# Patient Record
Sex: MALE | Race: WHITE | NOT HISPANIC OR LATINO | Employment: OTHER | URBAN - METROPOLITAN AREA
[De-identification: names, ages, dates, MRNs, and addresses within clinical notes are randomized per-mention and may not be internally consistent; named-entity substitution may affect disease eponyms.]

---

## 2017-01-08 RX ORDER — LEVOTHYROXINE SODIUM 175 UG/1
TABLET ORAL
Qty: 90 TABLET | Refills: 0 | Status: SHIPPED | OUTPATIENT
Start: 2017-01-08 | End: 2017-05-15 | Stop reason: SDUPTHER

## 2017-02-07 RX ORDER — ARMODAFINIL 250 MG/1
250 TABLET ORAL DAILY
Qty: 90 TABLET | Refills: 1 | Status: SHIPPED | OUTPATIENT
Start: 2017-02-07 | End: 2017-06-05 | Stop reason: SDUPTHER

## 2017-02-11 RX ORDER — LEVOTHYROXINE SODIUM 175 UG/1
TABLET ORAL
Qty: 90 TABLET | Refills: 0 | Status: SHIPPED | OUTPATIENT
Start: 2017-02-11 | End: 2017-05-15 | Stop reason: SDUPTHER

## 2017-03-14 ENCOUNTER — CLINICAL SUPPORT (OUTPATIENT)
Dept: AUDIOLOGY | Facility: CLINIC | Age: 47
End: 2017-03-14

## 2017-03-14 ENCOUNTER — CLINICAL SUPPORT (OUTPATIENT)
Dept: AUDIOLOGY | Facility: CLINIC | Age: 47
End: 2017-03-14
Payer: COMMERCIAL

## 2017-03-14 DIAGNOSIS — H90.0 CONDUCTIVE HEARING LOSS, BILATERAL: Primary | ICD-10-CM

## 2017-03-14 PROCEDURE — 92557 COMPREHENSIVE HEARING TEST: CPT | Mod: S$GLB,,, | Performed by: AUDIOLOGIST

## 2017-03-14 NOTE — PROGRESS NOTES
Patient is currently wearing [employer]  processors bilaterally.  Programmed processors based on today's audiogram and BC direct thresholds.  Sent paperwork to [employer] for upgrades.  The patient will be called when new [employer]aha 5 processors arrive.

## 2017-05-14 ENCOUNTER — PATIENT MESSAGE (OUTPATIENT)
Dept: FAMILY MEDICINE | Facility: CLINIC | Age: 47
End: 2017-05-14

## 2017-05-15 DIAGNOSIS — E07.9 THYROID DISEASE: Primary | ICD-10-CM

## 2017-05-15 RX ORDER — BUPROPION HYDROCHLORIDE 150 MG/1
150 TABLET, EXTENDED RELEASE ORAL 2 TIMES DAILY
Qty: 60 TABLET | Refills: 0 | Status: SHIPPED | OUTPATIENT
Start: 2017-05-15 | End: 2017-06-05 | Stop reason: SDUPTHER

## 2017-05-15 RX ORDER — LEVOTHYROXINE SODIUM 175 UG/1
175 TABLET ORAL DAILY
Qty: 30 TABLET | Refills: 0 | Status: SHIPPED | OUTPATIENT
Start: 2017-05-15 | End: 2017-06-05 | Stop reason: SDUPTHER

## 2017-05-25 ENCOUNTER — PATIENT OUTREACH (OUTPATIENT)
Dept: ADMINISTRATIVE | Facility: HOSPITAL | Age: 47
End: 2017-05-25

## 2017-05-25 NOTE — PROGRESS NOTES
Pre visit chart audit letter sent. Attempting to contact pt to schedule routine labs. Unable to reach patient at this time. Left voicemail.

## 2017-06-02 ENCOUNTER — CLINICAL SUPPORT (OUTPATIENT)
Dept: AUDIOLOGY | Facility: CLINIC | Age: 47
End: 2017-06-02

## 2017-06-02 DIAGNOSIS — H90.0 CONDUCTIVE HEARING LOSS, BILATERAL: Primary | ICD-10-CM

## 2017-06-02 PROCEDURE — 99499 UNLISTED E&M SERVICE: CPT | Mod: S$GLB,,, | Performed by: OTOLARYNGOLOGY

## 2017-06-02 NOTE — PROGRESS NOTES
Patient brought in new [employer] 5 processors (Right SN: [id number], Left SN: [id number]) for programming.  Measured thresholds through devices and programmed accordingly.  Paired phone clip to processors.  He seems happy with everything today and will call to schedule further appointments.

## 2017-06-05 ENCOUNTER — LAB VISIT (OUTPATIENT)
Dept: LAB | Facility: HOSPITAL | Age: 47
End: 2017-06-05
Attending: FAMILY MEDICINE
Payer: COMMERCIAL

## 2017-06-05 ENCOUNTER — OFFICE VISIT (OUTPATIENT)
Dept: FAMILY MEDICINE | Facility: CLINIC | Age: 47
End: 2017-06-05
Payer: COMMERCIAL

## 2017-06-05 VITALS
HEART RATE: 85 BPM | TEMPERATURE: 99 F | SYSTOLIC BLOOD PRESSURE: 110 MMHG | HEIGHT: 71 IN | OXYGEN SATURATION: 96 % | WEIGHT: 219.13 LBS | DIASTOLIC BLOOD PRESSURE: 70 MMHG | BODY MASS INDEX: 30.68 KG/M2

## 2017-06-05 DIAGNOSIS — E78.2 MIXED HYPERLIPIDEMIA: ICD-10-CM

## 2017-06-05 DIAGNOSIS — E07.9 THYROID DISEASE: ICD-10-CM

## 2017-06-05 DIAGNOSIS — G47.00 INSOMNIA, UNSPECIFIED TYPE: ICD-10-CM

## 2017-06-05 DIAGNOSIS — E03.4 HYPOTHYROIDISM DUE TO ACQUIRED ATROPHY OF THYROID: ICD-10-CM

## 2017-06-05 DIAGNOSIS — I10 ESSENTIAL HYPERTENSION: Primary | ICD-10-CM

## 2017-06-05 DIAGNOSIS — I10 ESSENTIAL HYPERTENSION: ICD-10-CM

## 2017-06-05 DIAGNOSIS — F32.A DEPRESSION, UNSPECIFIED DEPRESSION TYPE: ICD-10-CM

## 2017-06-05 LAB
ALBUMIN SERPL BCP-MCNC: 4 G/DL
ALP SERPL-CCNC: 69 U/L
ALT SERPL W/O P-5'-P-CCNC: 44 U/L
ANION GAP SERPL CALC-SCNC: 10 MMOL/L
AST SERPL-CCNC: 26 U/L
BASOPHILS # BLD AUTO: 0.04 K/UL
BASOPHILS NFR BLD: 0.4 %
BILIRUB SERPL-MCNC: 0.6 MG/DL
BUN SERPL-MCNC: 13 MG/DL
CALCIUM SERPL-MCNC: 9.8 MG/DL
CHLORIDE SERPL-SCNC: 97 MMOL/L
CHOLEST/HDLC SERPL: 4.6 {RATIO}
CO2 SERPL-SCNC: 26 MMOL/L
CREAT SERPL-MCNC: 1.1 MG/DL
DIFFERENTIAL METHOD: ABNORMAL
EOSINOPHIL # BLD AUTO: 0.3 K/UL
EOSINOPHIL NFR BLD: 2.3 %
ERYTHROCYTE [DISTWIDTH] IN BLOOD BY AUTOMATED COUNT: 13.3 %
EST. GFR  (AFRICAN AMERICAN): >60 ML/MIN/1.73 M^2
EST. GFR  (NON AFRICAN AMERICAN): >60 ML/MIN/1.73 M^2
GLUCOSE SERPL-MCNC: 110 MG/DL
HCT VFR BLD AUTO: 46 %
HDL/CHOLESTEROL RATIO: 21.8 %
HDLC SERPL-MCNC: 156 MG/DL
HDLC SERPL-MCNC: 34 MG/DL
HGB BLD-MCNC: 16.1 G/DL
LDLC SERPL CALC-MCNC: 84.8 MG/DL
LYMPHOCYTES # BLD AUTO: 3.5 K/UL
LYMPHOCYTES NFR BLD: 32.3 %
MCH RBC QN AUTO: 30.2 PG
MCHC RBC AUTO-ENTMCNC: 35 %
MCV RBC AUTO: 86 FL
MONOCYTES # BLD AUTO: 1.1 K/UL
MONOCYTES NFR BLD: 10.1 %
NEUTROPHILS # BLD AUTO: 5.9 K/UL
NEUTROPHILS NFR BLD: 54.7 %
NONHDLC SERPL-MCNC: 122 MG/DL
PLATELET # BLD AUTO: 267 K/UL
PMV BLD AUTO: 9.5 FL
POTASSIUM SERPL-SCNC: 3.8 MMOL/L
PROT SERPL-MCNC: 7.5 G/DL
RBC # BLD AUTO: 5.33 M/UL
SODIUM SERPL-SCNC: 133 MMOL/L
T4 FREE SERPL-MCNC: 1.35 NG/DL
TRIGL SERPL-MCNC: 186 MG/DL
TSH SERPL DL<=0.005 MIU/L-ACNC: 6.66 UIU/ML
WBC # BLD AUTO: 10.73 K/UL

## 2017-06-05 PROCEDURE — 80061 LIPID PANEL: CPT

## 2017-06-05 PROCEDURE — 36415 COLL VENOUS BLD VENIPUNCTURE: CPT | Mod: PO

## 2017-06-05 PROCEDURE — 99214 OFFICE O/P EST MOD 30 MIN: CPT | Mod: S$GLB,,, | Performed by: FAMILY MEDICINE

## 2017-06-05 PROCEDURE — 80053 COMPREHEN METABOLIC PANEL: CPT

## 2017-06-05 PROCEDURE — 84439 ASSAY OF FREE THYROXINE: CPT

## 2017-06-05 PROCEDURE — 85025 COMPLETE CBC W/AUTO DIFF WBC: CPT

## 2017-06-05 PROCEDURE — 99999 PR PBB SHADOW E&M-EST. PATIENT-LVL III: CPT | Mod: PBBFAC,,, | Performed by: FAMILY MEDICINE

## 2017-06-05 PROCEDURE — 84443 ASSAY THYROID STIM HORMONE: CPT

## 2017-06-05 PROCEDURE — 83036 HEMOGLOBIN GLYCOSYLATED A1C: CPT

## 2017-06-05 RX ORDER — LISINOPRIL AND HYDROCHLOROTHIAZIDE 12.5; 2 MG/1; MG/1
1 TABLET ORAL DAILY
Qty: 90 TABLET | Refills: 3 | Status: SHIPPED | OUTPATIENT
Start: 2017-06-05 | End: 2018-06-21 | Stop reason: SDUPTHER

## 2017-06-05 RX ORDER — ATORVASTATIN CALCIUM 20 MG/1
20 TABLET, FILM COATED ORAL DAILY
Qty: 90 TABLET | Refills: 3 | Status: SHIPPED | OUTPATIENT
Start: 2017-06-05 | End: 2018-08-02 | Stop reason: SDUPTHER

## 2017-06-05 RX ORDER — BUPROPION HYDROCHLORIDE 150 MG/1
150 TABLET, EXTENDED RELEASE ORAL 2 TIMES DAILY
Qty: 180 TABLET | Refills: 3 | Status: SHIPPED | OUTPATIENT
Start: 2017-06-05 | End: 2018-10-23 | Stop reason: SDUPTHER

## 2017-06-05 RX ORDER — LEVOTHYROXINE SODIUM 175 UG/1
175 TABLET ORAL DAILY
Qty: 90 TABLET | Refills: 3 | Status: SHIPPED | OUTPATIENT
Start: 2017-06-05 | End: 2017-08-07 | Stop reason: SDUPTHER

## 2017-06-05 RX ORDER — ESZOPICLONE 2 MG/1
2 TABLET, FILM COATED ORAL NIGHTLY
Qty: 90 TABLET | Refills: 1 | Status: SHIPPED | OUTPATIENT
Start: 2017-06-05 | End: 2017-11-03 | Stop reason: SDUPTHER

## 2017-06-05 RX ORDER — ARMODAFINIL 250 MG/1
250 TABLET ORAL DAILY
Qty: 90 TABLET | Refills: 1 | Status: SHIPPED | OUTPATIENT
Start: 2017-06-05 | End: 2018-02-16 | Stop reason: SDUPTHER

## 2017-06-05 RX ORDER — METOPROLOL SUCCINATE 50 MG/1
50 TABLET, EXTENDED RELEASE ORAL DAILY
Qty: 90 TABLET | Refills: 3 | Status: SHIPPED | OUTPATIENT
Start: 2017-06-05 | End: 2018-05-24 | Stop reason: SDUPTHER

## 2017-06-05 NOTE — PROGRESS NOTES
Chief Complaint:    Chief Complaint   Patient presents with    Annual Exam       History of Present Illness:  Patient is here for follow-up,  Doing well he has hypertension hyperlipidemia and depression chronic insomnia and shift work disorder and hypothyroidism.  He is taking the medication no side effects.  He is due for labs.      ROS:  Review of Systems   Constitutional: Negative for activity change, chills, fatigue, fever and unexpected weight change.   HENT: Negative for congestion, ear discharge, ear pain, hearing loss, postnasal drip and rhinorrhea.    Eyes: Negative for pain and visual disturbance.   Respiratory: Negative for cough, chest tightness and shortness of breath.    Cardiovascular: Negative for chest pain and palpitations.   Gastrointestinal: Negative for abdominal pain, diarrhea and vomiting.   Endocrine: Negative for heat intolerance.   Genitourinary: Negative for dysuria, flank pain, frequency and hematuria.   Musculoskeletal: Negative for back pain, gait problem and neck pain.   Skin: Negative for color change and rash.   Neurological: Negative for dizziness, tremors, seizures, numbness and headaches.   Psychiatric/Behavioral: Negative for agitation, hallucinations, self-injury, sleep disturbance and suicidal ideas. The patient is not nervous/anxious.        Past Medical History:   Diagnosis Date    Anxiety     Depression     Hypertension     Sleep disorder     Stroke        Social History:  Social History     Social History    Marital status:      Spouse name: N/A    Number of children: N/A    Years of education: N/A     Social History Main Topics    Smoking status: Current Every Day Smoker     Packs/day: 1.00    Smokeless tobacco: Never Used      Comment: vape     Alcohol use No    Drug use: No    Sexual activity: Not Currently     Partners: Female     Birth control/ protection: None     Other Topics Concern    None     Social History Narrative    None       Family  "History:   family history is not on file.    Health Maintenance   Topic Date Due    Pneumococcal PPSV23 (Medium Risk) (1) 04/28/1986    Lipid Panel  02/09/2017    Influenza Vaccine  08/01/2017    TETANUS VACCINE  05/14/2025       Physical Exam:    Vital Signs  Temp: 98.6 °F (37 °C)  Temp src: Tympanic  Pulse: 85  SpO2: 96 %  BP: 110/70  BP Location: Right arm  BP Method: Manual  Patient Position: Sitting  Pain Score: 0-No pain  Height and Weight  Height: 5' 11.25" (181 cm)  Weight: 99.4 kg (219 lb 2.2 oz)  BSA (Calculated - sq m): 2.24 sq meters  BMI (Calculated): 30.4  Weight in (lb) to have BMI = 25: 180.1]    Body mass index is 30.35 kg/m².    Physical Exam   Constitutional: He is oriented to person, place, and time. He appears well-developed.   HENT:   Mouth/Throat: Oropharynx is clear and moist.   Eyes: Conjunctivae are normal. Pupils are equal, round, and reactive to light.   Neck: Normal range of motion. Neck supple.   Cardiovascular: Normal rate, regular rhythm and normal heart sounds.    No murmur heard.  Pulmonary/Chest: Effort normal and breath sounds normal. No respiratory distress. He has no wheezes. He has no rales. He exhibits no tenderness.   Abdominal: Soft. He exhibits no distension and no mass. There is no tenderness. There is no guarding.   Musculoskeletal: He exhibits no edema or tenderness.   Lymphadenopathy:     He has no cervical adenopathy.   Neurological: He is alert and oriented to person, place, and time. He has normal reflexes.   Skin: Skin is warm and dry.   Psychiatric: He has a normal mood and affect. His behavior is normal. Judgment and thought content normal.       Lab Results   Component Value Date    CHOL 229 (H) 02/09/2016    CHOL 201 (H) 03/08/2013    CHOL 163 04/20/2012    TRIG 202 (H) 02/09/2016    TRIG 283 (H) 03/08/2013    TRIG 238 (H) 04/20/2012    HDL 35 (L) 02/09/2016    HDL 31 (L) 03/08/2013    HDL 27 (L) 04/20/2012    TOTALCHOLEST 6.5 (H) 02/09/2016    TOTALCHOLEST " 6.5 (H) 03/08/2013    TOTALCHOLEST 6.0 (H) 04/20/2012    NONHDLCHOL 194 02/09/2016       Lab Results   Component Value Date    HGBA1C 5.6 02/09/2016       Assessment:      ICD-10-CM ICD-9-CM   1. Essential hypertension I10 401.9   2. Thyroid disease E07.9 246.9   3. Depression, unspecified depression type F32.9 311   4. Hypothyroidism due to acquired atrophy of thyroid E03.4 244.8     246.8   5. Insomnia, unspecified type G47.00 780.52   6. Mixed hyperlipidemia E78.2 272.2         Plan:  Discontinue current medication.  We'll check labs as below.  Refill sent to the pharmacy.  Follow-up 6 months  Orders Placed This Encounter   Procedures    CBC auto differential    Comprehensive metabolic panel    Lipid panel    Hemoglobin A1c    TSH       Current Outpatient Prescriptions   Medication Sig Dispense Refill    armodafinil (NUVIGIL) 250 mg tablet Take 1 tablet (250 mg total) by mouth once daily. 90 tablet 1    atorvastatin (LIPITOR) 20 MG tablet Take 1 tablet (20 mg total) by mouth once daily. 90 tablet 3    buPROPion (WELLBUTRIN SR) 150 MG TBSR 12 hr tablet Take 1 tablet (150 mg total) by mouth 2 (two) times daily. 180 tablet 3    eszopiclone (LUNESTA) 2 MG Tab Take 1 tablet (2 mg total) by mouth every evening. 90 tablet 1    levothyroxine (SYNTHROID, LEVOTHROID) 175 MCG tablet Take 1 tablet (175 mcg total) by mouth once daily. 90 tablet 3    lisinopril-hydrochlorothiazide (PRINZIDE,ZESTORETIC) 20-12.5 mg per tablet Take 1 tablet by mouth once daily. 90 tablet 3    metoprolol succinate (TOPROL-XL) 50 MG 24 hr tablet Take 1 tablet (50 mg total) by mouth once daily. 90 tablet 3     No current facility-administered medications for this visit.        Medications Discontinued During This Encounter   Medication Reason    atorvastatin (LIPITOR) 20 MG tablet Reorder    buPROPion (WELLBUTRIN SR) 150 MG TBSR 12 hr tablet Reorder    levothyroxine (SYNTHROID, LEVOTHROID) 175 MCG tablet Reorder     lisinopril-hydrochlorothiazide (PRINZIDE,ZESTORETIC) 20-12.5 mg per tablet Reorder    metoprolol succinate (TOPROL-XL) 50 MG 24 hr tablet Reorder    armodafinil (NUVIGIL) 250 mg tablet Reorder    eszopiclone (LUNESTA) 2 MG Tab Reorder       Return in about 6 months (around 12/5/2017).      Dr Delores Lazar MD    Disclaimer: This note is prepared using voice recognition system and as such is likely to have errors and is not proof read.

## 2017-06-06 LAB
ESTIMATED AVG GLUCOSE: 146 MG/DL
HBA1C MFR BLD HPLC: 6.7 %

## 2017-06-16 ENCOUNTER — PATIENT MESSAGE (OUTPATIENT)
Dept: FAMILY MEDICINE | Facility: CLINIC | Age: 47
End: 2017-06-16

## 2017-06-28 ENCOUNTER — PATIENT MESSAGE (OUTPATIENT)
Dept: FAMILY MEDICINE | Facility: CLINIC | Age: 47
End: 2017-06-28

## 2017-07-21 ENCOUNTER — OFFICE VISIT (OUTPATIENT)
Dept: PULMONOLOGY | Facility: CLINIC | Age: 47
End: 2017-07-21
Payer: COMMERCIAL

## 2017-07-21 VITALS
BODY MASS INDEX: 29.04 KG/M2 | SYSTOLIC BLOOD PRESSURE: 130 MMHG | HEIGHT: 71 IN | HEART RATE: 82 BPM | DIASTOLIC BLOOD PRESSURE: 84 MMHG | OXYGEN SATURATION: 98 % | WEIGHT: 207.44 LBS | RESPIRATION RATE: 18 BRPM

## 2017-07-21 DIAGNOSIS — G47.33 OSA (OBSTRUCTIVE SLEEP APNEA): Primary | ICD-10-CM

## 2017-07-21 DIAGNOSIS — G47.00 INSOMNIA, UNSPECIFIED TYPE: ICD-10-CM

## 2017-07-21 DIAGNOSIS — R06.83 SNORING: ICD-10-CM

## 2017-07-21 PROCEDURE — 99204 OFFICE O/P NEW MOD 45 MIN: CPT | Mod: S$GLB,,, | Performed by: NURSE PRACTITIONER

## 2017-07-21 PROCEDURE — 99999 PR PBB SHADOW E&M-EST. PATIENT-LVL III: CPT | Mod: PBBFAC,,, | Performed by: NURSE PRACTITIONER

## 2017-07-21 NOTE — PROGRESS NOTES
"Subjective:      Patient ID: Wilman Neri is a 47 y.o. male.    Chief Complaint: Other (Sleep Eval)    Patient presents to the office today for evaluation of sleep apnea. Seen in the pulmonary department years ago. Patient diagnosed as sleep apnea in 2009.  He was unable to tolerate high setting on BiPAP at that time.  Patient continues to have sleep apnea symptoms.  He is waking up choking at night.  He is not sleeping well.  He is motivated to start back on therapy.  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  He is taking Nuvigil to stay away during the day, and Lunesta to fall asleep at night.    STOP - BANG Questionnaire:     1. Snoring : Do you snore loudly ?    Yes    2. Tired : Do you often feel tired, fatigued, or sleepy during daytime? Yes    3. Observed: Has anyone observed you stop breathing during your sleep?   Yes     4. Blood pressure : Do you have or are you being treated for high blood pressure?   Yes    5. BMI :BMI more than 35 kg/m2?   No    6. Age : Age over 50 yr old?   No    7. Neck circumference: Neck circumference greater than 40 cm?   No    8. Gender: Gender male?   Yes    High risk of STEPH: Yes 5 - 8  Intermediate risk of STEPH: Yes 3 - 4  Low risk of STEPH: Yes 0 - 2      References:   STOP Questionnaire   A Tool to Screen Patients for Obstructive Sleep Apnea: KERRY Luo.C.P.C., VAUGHN Zapata.B.B.S., Vineet Conner M.D.,Angela Mcdermott, Ph.D., Marvel Henriquez M.B.B.S.,_ VAUGHN Lau.Sc.,_ Marlen Mcwilliams M.D., Rajinder Watson F.R.C.P.C.; Anesthesiology 2008; 108:812-21 Copyright © 2008, the American Society of Anesthesiologists, Inc. [employer]          /84 (BP Location: Right arm, Patient Position: Sitting, BP Method: Manual)   Pulse 82   Resp 18   Ht 5' 11.25" (1.81 m)   Wt 94.1 kg (207 lb 7.3 oz)   SpO2 98%   BMI 28.73 kg/m²   Body mass index is 28.73 kg/m².    Review of Systems   Respiratory: Positive for apnea and " snoring.         See HPI   Psychiatric/Behavioral: Positive for sleep disturbance.   All other systems reviewed and are negative.    Objective:      Physical Exam   Constitutional: He is oriented to person, place, and time. He appears well-developed and well-nourished.   HENT:   Head: Normocephalic and atraumatic.   Mouth/Throat: Oropharynx is clear and moist.   Eyes: EOM are normal. Pupils are equal, round, and reactive to light.   Neck: Normal range of motion. Neck supple.   Cardiovascular: Normal rate and regular rhythm.  Exam reveals no gallop and no friction rub.    No murmur heard.  Pulmonary/Chest: Effort normal and breath sounds normal.   Abdominal: Soft. He exhibits no distension. There is no tenderness.   Musculoskeletal: Normal range of motion.   Neurological: He is alert and oriented to person, place, and time.   Skin: Skin is warm and dry.   Psychiatric: He has a normal mood and affect.     Personal Diagnostic Review  4/27/09 polysomnogram AHI 38.6    Assessment:       1. STEPH (obstructive sleep apnea)    2. Snoring    3. Insomnia, unspecified type        Outpatient Encounter Prescriptions as of 7/21/2017   Medication Sig Dispense Refill    armodafinil (NUVIGIL) 250 mg tablet Take 1 tablet (250 mg total) by mouth once daily. 90 tablet 1    atorvastatin (LIPITOR) 20 MG tablet Take 1 tablet (20 mg total) by mouth once daily. 90 tablet 3    buPROPion (WELLBUTRIN SR) 150 MG TBSR 12 hr tablet Take 1 tablet (150 mg total) by mouth 2 (two) times daily. 180 tablet 3    eszopiclone (LUNESTA) 2 MG Tab Take 1 tablet (2 mg total) by mouth every evening. 90 tablet 1    levothyroxine (SYNTHROID, LEVOTHROID) 175 MCG tablet Take 1 tablet (175 mcg total) by mouth once daily. 90 tablet 3    lisinopril-hydrochlorothiazide (PRINZIDE,ZESTORETIC) 20-12.5 mg per tablet Take 1 tablet by mouth once daily. 90 tablet 3    metoprolol succinate (TOPROL-XL) 50 MG 24 hr tablet Take 1 tablet (50 mg total) by mouth once daily. 90  tablet 3     No facility-administered encounter medications on file as of 7/21/2017.      Orders Placed This Encounter   Procedures    CPAP FOR HOME USE     Order Specific Question:   Type:     Answer:   Auto CPAP     Order Specific Question:   Auto CPAP pressure setting range (cmH20):     Answer:   4-20     Order Specific Question:   Length of need (1-99 months):     Answer:   99     Order Specific Question:   Humidification:     Answer:   Heated     Order Specific Question:   Type of mask:     Answer:   Nasal     Comments:   or FFM     Order Specific Question:   Headgear?     Answer:   Yes     Order Specific Question:   Tubing?     Answer:   Yes     Order Specific Question:   Humidifier chamber?     Answer:   Yes     Order Specific Question:   Chin strap?     Answer:   Yes     Order Specific Question:   Filters?     Answer:   Yes     Plan:      Start autoPAP 4-20 cm H2O and follow up in 6 weeks with download of data card and review of symptoms.  May need titration study if not effective.

## 2017-08-01 ENCOUNTER — PATIENT MESSAGE (OUTPATIENT)
Dept: SLEEP MEDICINE | Facility: CLINIC | Age: 47
End: 2017-08-01

## 2017-08-07 ENCOUNTER — OFFICE VISIT (OUTPATIENT)
Dept: FAMILY MEDICINE | Facility: CLINIC | Age: 47
End: 2017-08-07
Payer: COMMERCIAL

## 2017-08-07 VITALS
BODY MASS INDEX: 28.13 KG/M2 | SYSTOLIC BLOOD PRESSURE: 115 MMHG | HEART RATE: 77 BPM | OXYGEN SATURATION: 98 % | DIASTOLIC BLOOD PRESSURE: 70 MMHG | HEIGHT: 71 IN | WEIGHT: 200.94 LBS | TEMPERATURE: 99 F

## 2017-08-07 DIAGNOSIS — E11.9 DIABETIC EYE EXAM: ICD-10-CM

## 2017-08-07 DIAGNOSIS — E03.4 HYPOTHYROIDISM DUE TO ACQUIRED ATROPHY OF THYROID: ICD-10-CM

## 2017-08-07 DIAGNOSIS — Z01.00 DIABETIC EYE EXAM: ICD-10-CM

## 2017-08-07 DIAGNOSIS — E11.9 NEWLY DIAGNOSED DIABETES: Primary | ICD-10-CM

## 2017-08-07 PROCEDURE — 4010F ACE/ARB THERAPY RXD/TAKEN: CPT | Mod: S$GLB,,, | Performed by: FAMILY MEDICINE

## 2017-08-07 PROCEDURE — 99214 OFFICE O/P EST MOD 30 MIN: CPT | Mod: S$GLB,,, | Performed by: FAMILY MEDICINE

## 2017-08-07 PROCEDURE — 3008F BODY MASS INDEX DOCD: CPT | Mod: S$GLB,,, | Performed by: FAMILY MEDICINE

## 2017-08-07 PROCEDURE — 3074F SYST BP LT 130 MM HG: CPT | Mod: S$GLB,,, | Performed by: FAMILY MEDICINE

## 2017-08-07 PROCEDURE — 3044F HG A1C LEVEL LT 7.0%: CPT | Mod: S$GLB,,, | Performed by: FAMILY MEDICINE

## 2017-08-07 PROCEDURE — 99999 PR PBB SHADOW E&M-EST. PATIENT-LVL III: CPT | Mod: PBBFAC,,, | Performed by: FAMILY MEDICINE

## 2017-08-07 PROCEDURE — 3078F DIAST BP <80 MM HG: CPT | Mod: S$GLB,,, | Performed by: FAMILY MEDICINE

## 2017-08-07 RX ORDER — METFORMIN HYDROCHLORIDE 500 MG/1
500 TABLET ORAL 2 TIMES DAILY WITH MEALS
Qty: 180 TABLET | Refills: 3 | Status: SHIPPED | OUTPATIENT
Start: 2017-08-07 | End: 2018-08-12 | Stop reason: SDUPTHER

## 2017-08-07 RX ORDER — LEVOTHYROXINE SODIUM 200 UG/1
200 TABLET ORAL DAILY
Qty: 30 TABLET | Refills: 6 | Status: SHIPPED | OUTPATIENT
Start: 2017-08-07 | End: 2018-08-12 | Stop reason: SDUPTHER

## 2017-08-07 RX ORDER — MAGNESIUM GLUCONATE 27.5 (500)
500 TABLET ORAL DAILY
COMMUNITY
Start: 2017-07-07 | End: 2019-07-30

## 2017-08-07 NOTE — PROGRESS NOTES
"  Chief Complaint:    Chief Complaint   Patient presents with    Follow-up       History of Present Illness:    Patient here to discuss his recently diagnosed diabetes.  This is being diagnoses been watching his diet he is lost about 20 pounds he was drinking several Cokes per day which she has cut down.  He is feeling good.  Does have a distant history of diabetes.    ROS:  Review of Systems    Past Medical History:   Diagnosis Date    Anxiety     Depression     Hypertension     Sleep disorder     Stroke        Social History:  Social History     Social History    Marital status:      Spouse name: N/A    Number of children: N/A    Years of education: N/A     Social History Main Topics    Smoking status: Former Smoker     Packs/day: 1.00     Types: Vaping w/o nicotine    Smokeless tobacco: Never Used      Comment: vape     Alcohol use No    Drug use: No    Sexual activity: Yes     Partners: Female     Birth control/ protection: None     Other Topics Concern    None     Social History Narrative    None       Family History:   family history is not on file.    Health Maintenance   Topic Date Due    Influenza Vaccine  08/01/2017    Lipid Panel  06/05/2018    TETANUS VACCINE  05/14/2025       Physical Exam:    Vital Signs  Temp: 98.9 °F (37.2 °C)  Temp src: Tympanic  Pulse: 77  SpO2: 98 %  BP: 115/70  BP Location: Left arm  BP Method: Manual  Patient Position: Sitting  Pain Score: 0-No pain  Height and Weight  Height: 5' 11" (180.3 cm)  Weight: 91.2 kg (200 lb 15.2 oz)  BSA (Calculated - sq m): 2.14 sq meters  BMI (Calculated): 28.1  Weight in (lb) to have BMI = 25: 178.9]    Body mass index is 28.03 kg/m².    Physical Exam    Lab Results   Component Value Date    CHOL 156 06/05/2017    CHOL 229 (H) 02/09/2016    CHOL 201 (H) 03/08/2013    TRIG 186 (H) 06/05/2017    TRIG 202 (H) 02/09/2016    TRIG 283 (H) 03/08/2013    HDL 34 (L) 06/05/2017    HDL 35 (L) 02/09/2016    HDL 31 (L) 03/08/2013    " TOTALCHOLEST 4.6 06/05/2017    TOTALCHOLEST 6.5 (H) 02/09/2016    TOTALCHOLEST 6.5 (H) 03/08/2013    NONHDLCHOL 122 06/05/2017    NONHDLCHOL 194 02/09/2016       Lab Results   Component Value Date    HGBA1C 6.7 (H) 06/05/2017       Assessment:      ICD-10-CM ICD-9-CM   1. Newly diagnosed diabetes E11.9 250.00   2. Hypothyroidism due to acquired atrophy of thyroid E03.4 244.8     246.8   3. Diabetic eye exam E11.9 V72.0    Z01.00 250.00         Plan:  We discussed the treatment of diabetes mellitus type 2.  We discussed elimination of simple carbohydrates and replacing with complex carbohydrates.  We discussed limiting the total carbohydrate intake at each of it each meal to no more than 30 g.  Replace white bread mid 100%whole wheat bread, white rice and brown rice, and white potato with sweet potato.  Use half cup measured.  Increase intake of vegetables, urge to eat 1-2 servings of fruit but limited juice.  Recommend walking programs 20 minutes twice a day.  Recommend metformin, side effects of metformin discussed with the patient.  Recommend diabetic education and dietary consultation.    Start on metformin 5 mg twice a day.    Increase Synthroid to 200 µg daily.  Take an empty stomach, do not take any other medication within an hour of taking Synthroid.    Schedule eye exam.    Offered diabetic education and he refuses.          Orders Placed This Encounter   Procedures    Hemoglobin A1c    TSH    Comprehensive metabolic panel    Lipid panel    Ambulatory referral to Optometry       Current Outpatient Prescriptions   Medication Sig Dispense Refill    armodafinil (NUVIGIL) 250 mg tablet Take 1 tablet (250 mg total) by mouth once daily. 90 tablet 1    atorvastatin (LIPITOR) 20 MG tablet Take 1 tablet (20 mg total) by mouth once daily. 90 tablet 3    buPROPion (WELLBUTRIN SR) 150 MG TBSR 12 hr tablet Take 1 tablet (150 mg total) by mouth 2 (two) times daily. 180 tablet 3    eszopiclone (LUNESTA) 2 MG Tab  Take 1 tablet (2 mg total) by mouth every evening. 90 tablet 1    levothyroxine (SYNTHROID) 200 MCG tablet Take 1 tablet (200 mcg total) by mouth once daily. 30 tablet 6    lisinopril-hydrochlorothiazide (PRINZIDE,ZESTORETIC) 20-12.5 mg per tablet Take 1 tablet by mouth once daily. 90 tablet 3    magnesium gluconate 27.5 mg (500 mg) Tab Take 500 mg by mouth once daily.      metoprolol succinate (TOPROL-XL) 50 MG 24 hr tablet Take 1 tablet (50 mg total) by mouth once daily. 90 tablet 3    metformin (GLUCOPHAGE) 500 MG tablet Take 1 tablet (500 mg total) by mouth 2 (two) times daily with meals. 180 tablet 3     No current facility-administered medications for this visit.        Medications Discontinued During This Encounter   Medication Reason    levothyroxine (SYNTHROID, LEVOTHROID) 175 MCG tablet Reorder       Return in about 8 weeks (around 10/2/2017).      Dr Delores Lazar MD    Disclaimer: This note is prepared using voice recognition system and as such is likely to have errors and is not proof read.

## 2017-08-15 ENCOUNTER — OFFICE VISIT (OUTPATIENT)
Dept: OPHTHALMOLOGY | Facility: CLINIC | Age: 47
End: 2017-08-15
Payer: COMMERCIAL

## 2017-08-15 DIAGNOSIS — H52.13 MYOPIA, BILATERAL: ICD-10-CM

## 2017-08-15 DIAGNOSIS — E11.9 DIABETES MELLITUS TYPE 2 WITHOUT RETINOPATHY: Primary | ICD-10-CM

## 2017-08-15 DIAGNOSIS — I10 ESSENTIAL HYPERTENSION: ICD-10-CM

## 2017-08-15 DIAGNOSIS — H52.4 BILATERAL PRESBYOPIA: ICD-10-CM

## 2017-08-15 PROCEDURE — 92004 COMPRE OPH EXAM NEW PT 1/>: CPT | Mod: S$GLB,,, | Performed by: OPTOMETRIST

## 2017-08-15 PROCEDURE — 92015 DETERMINE REFRACTIVE STATE: CPT | Mod: S$GLB,,, | Performed by: OPTOMETRIST

## 2017-08-15 NOTE — PROGRESS NOTES
HPI     Diabetic Eye Exam    Additional comments: no eye drops and no eye ointments           Comments   DM eye exam diagnosed in June 2017.  Patient states that his vision has been getting worse over the years.  No headaches when the patient wears his glasses.  Left eye feels dry more so than the right eye  No eye drops and no eye ointments       Last edited by Nan Israel on 8/15/2017  4:28 PM. (History)            Assessment /Plan     For exam results, see Encounter Report.    Diabetes mellitus type 2 without retinopathy    Essential hypertension    Myopia, bilateral    Bilateral presbyopia      No Background Diabetic Retinopathy    No HTN Retinopathy    Dispense Final Rx for glasses.  RTC 1 year

## 2017-08-15 NOTE — LETTER
August 15, 2017      Delores Lazar MD  [address] [address] [address]           [facility]  [address] [place] [id number]  Phone: [phone]  Fax: [phone]          Patient: Wilman Neri   MR Number: [id number]   YOB: 1970   Date of Visit: 8/15/2017       Dear Dr. Delores Lazar:    Thank you for referring Wilman Neri to me for evaluation. Attached you will find relevant portions of my assessment and plan of care.    If you have questions, please do not hesitate to call me. I look forward to following Wilman Neri along with you.    Sincerely,    Raymond Carney, OD    Enclosure  CC:  No Recipients    If you would like to receive this communication electronically, please contact [email or web address] or [phone] to request more information on [facility] Link access.    For providers and/or their staff who would like to refer a patient to [facility], please contact us through our one-stop-shop provider referral line, Kirt Beth, at [phone].    If you feel you have received this communication in error or would no longer like to receive these types of communications, please e-mail [email or web address]

## 2017-09-07 ENCOUNTER — LAB VISIT (OUTPATIENT)
Dept: LAB | Facility: HOSPITAL | Age: 47
End: 2017-09-07
Attending: FAMILY MEDICINE
Payer: COMMERCIAL

## 2017-09-07 DIAGNOSIS — E03.4 HYPOTHYROIDISM DUE TO ACQUIRED ATROPHY OF THYROID: ICD-10-CM

## 2017-09-07 DIAGNOSIS — E11.9 NEWLY DIAGNOSED DIABETES: ICD-10-CM

## 2017-09-07 LAB
ALBUMIN SERPL BCP-MCNC: 3.9 G/DL
ALP SERPL-CCNC: 49 U/L
ALT SERPL W/O P-5'-P-CCNC: 24 U/L
ANION GAP SERPL CALC-SCNC: 14 MMOL/L
AST SERPL-CCNC: 21 U/L
BILIRUB SERPL-MCNC: 0.5 MG/DL
BUN SERPL-MCNC: 23 MG/DL
CALCIUM SERPL-MCNC: 9.4 MG/DL
CHLORIDE SERPL-SCNC: 103 MMOL/L
CHOLEST SERPL-MCNC: 136 MG/DL
CHOLEST/HDLC SERPL: 4 {RATIO}
CO2 SERPL-SCNC: 22 MMOL/L
CREAT SERPL-MCNC: 1.2 MG/DL
EST. GFR  (AFRICAN AMERICAN): >60 ML/MIN/1.73 M^2
EST. GFR  (NON AFRICAN AMERICAN): >60 ML/MIN/1.73 M^2
GLUCOSE SERPL-MCNC: 100 MG/DL
HDLC SERPL-MCNC: 34 MG/DL
HDLC SERPL: 25 %
LDLC SERPL CALC-MCNC: 78.8 MG/DL
NONHDLC SERPL-MCNC: 102 MG/DL
POTASSIUM SERPL-SCNC: 4.3 MMOL/L
PROT SERPL-MCNC: 7 G/DL
SODIUM SERPL-SCNC: 139 MMOL/L
T4 FREE SERPL-MCNC: 1.96 NG/DL
TRIGL SERPL-MCNC: 116 MG/DL
TSH SERPL DL<=0.005 MIU/L-ACNC: 0.05 UIU/ML

## 2017-09-07 PROCEDURE — 84439 ASSAY OF FREE THYROXINE: CPT

## 2017-09-07 PROCEDURE — 84443 ASSAY THYROID STIM HORMONE: CPT

## 2017-09-07 PROCEDURE — 80053 COMPREHEN METABOLIC PANEL: CPT

## 2017-09-07 PROCEDURE — 36415 COLL VENOUS BLD VENIPUNCTURE: CPT | Mod: PO

## 2017-09-07 PROCEDURE — 80061 LIPID PANEL: CPT

## 2017-09-07 PROCEDURE — 83036 HEMOGLOBIN GLYCOSYLATED A1C: CPT

## 2017-09-08 LAB
ESTIMATED AVG GLUCOSE: 103 MG/DL
HBA1C MFR BLD HPLC: 5.2 %

## 2017-09-12 ENCOUNTER — OFFICE VISIT (OUTPATIENT)
Dept: SLEEP MEDICINE | Facility: CLINIC | Age: 47
End: 2017-09-12
Payer: COMMERCIAL

## 2017-09-12 VITALS
RESPIRATION RATE: 18 BRPM | DIASTOLIC BLOOD PRESSURE: 62 MMHG | BODY MASS INDEX: 26.82 KG/M2 | HEIGHT: 71 IN | SYSTOLIC BLOOD PRESSURE: 115 MMHG | WEIGHT: 191.56 LBS | HEART RATE: 60 BPM | OXYGEN SATURATION: 98 %

## 2017-09-12 DIAGNOSIS — G47.33 OSA (OBSTRUCTIVE SLEEP APNEA): Primary | ICD-10-CM

## 2017-09-12 DIAGNOSIS — G47.26 CIRCADIAN RHYTHM SLEEP DISORDER, SHIFT WORK TYPE: ICD-10-CM

## 2017-09-12 PROCEDURE — 3008F BODY MASS INDEX DOCD: CPT | Mod: S$GLB,,, | Performed by: NURSE PRACTITIONER

## 2017-09-12 PROCEDURE — 99213 OFFICE O/P EST LOW 20 MIN: CPT | Mod: S$GLB,,, | Performed by: NURSE PRACTITIONER

## 2017-09-12 PROCEDURE — 3074F SYST BP LT 130 MM HG: CPT | Mod: S$GLB,,, | Performed by: NURSE PRACTITIONER

## 2017-09-12 PROCEDURE — 99999 PR PBB SHADOW E&M-EST. PATIENT-LVL IV: CPT | Mod: PBBFAC,,, | Performed by: NURSE PRACTITIONER

## 2017-09-12 PROCEDURE — 3078F DIAST BP <80 MM HG: CPT | Mod: S$GLB,,, | Performed by: NURSE PRACTITIONER

## 2017-09-12 NOTE — PROGRESS NOTES
"Subjective:      Patient ID: Wilman Neri is a 47 y.o. male.    Chief Complaint: Sleep Apnea    Patient presents to the office today for evaluation of AutoPap.  Patient states he is tolerating AutoPap.  Improved symptoms during the day.  Sleeps better night.  Patient was originally diagnosed with sleep apnea in 2009.  He was unable to tolerate high BiPAP pressures at that time.  He takes Nuvigil prescribed by Dr. Lazar.   He takes Lunesta for insomnia.  We discussed tapering down on Lunesta once he is comfortable with AutoPap.    Patient Active Problem List:     HTN (hypertension)     Hypothyroidism     Tobacco abuse     Depression     Circadian rhythm sleep disorder, shift work type     Insomnia     Hemispheric carotid artery syndrome            /62   Pulse 60   Resp 18   Ht 5' 11" (1.803 m)   Wt 86.9 kg (191 lb 9.3 oz)   SpO2 98%   BMI 26.72 kg/m²   Body mass index is 26.72 kg/m².    Review of Systems   Constitutional: Negative.    HENT: Negative.    Respiratory: Negative.    Cardiovascular: Negative.    Musculoskeletal: Negative.    Gastrointestinal: Negative.    Neurological: Negative.    Psychiatric/Behavioral: Negative.      Objective:      Physical Exam   Constitutional: He is oriented to person, place, and time. He appears well-developed and well-nourished.   HENT:   Head: Normocephalic and atraumatic.   Neck: Normal range of motion. Neck supple.   Neurological: He is alert and oriented to person, place, and time.   Skin: Skin is warm and dry.   Psychiatric: He has a normal mood and affect.     Personal Diagnostic Review  Autopap download: Patient wears on average 6 hrs and 51 minutes. Greater than 4 hrs 90 % of the time. 90% percentile pressure 6.7 with AHI 2.5 events/hr    Assessment:       1. STEPH (obstructive sleep apnea)    2. Circadian rhythm sleep disorder, shift work type        Outpatient Encounter Prescriptions as of 9/12/2017   Medication Sig Dispense Refill    armodafinil " (NUVIGIL) 250 mg tablet Take 1 tablet (250 mg total) by mouth once daily. 90 tablet 1    atorvastatin (LIPITOR) 20 MG tablet Take 1 tablet (20 mg total) by mouth once daily. 90 tablet 3    buPROPion (WELLBUTRIN SR) 150 MG TBSR 12 hr tablet Take 1 tablet (150 mg total) by mouth 2 (two) times daily. 180 tablet 3    eszopiclone (LUNESTA) 2 MG Tab Take 1 tablet (2 mg total) by mouth every evening. 90 tablet 1    levothyroxine (SYNTHROID) 200 MCG tablet Take 1 tablet (200 mcg total) by mouth once daily. 30 tablet 6    lisinopril-hydrochlorothiazide (PRINZIDE,ZESTORETIC) 20-12.5 mg per tablet Take 1 tablet by mouth once daily. 90 tablet 3    magnesium gluconate 27.5 mg (500 mg) Tab Take 500 mg by mouth once daily.      MELATONIN ORAL Take by mouth. Patient states that he takes 5 mg daily      metformin (GLUCOPHAGE) 500 MG tablet Take 1 tablet (500 mg total) by mouth 2 (two) times daily with meals. (Patient taking differently: Take 500 mg by mouth 2 (two) times daily with meals. Patients that he takes this medication once daily) 180 tablet 3    metoprolol succinate (TOPROL-XL) 50 MG 24 hr tablet Take 1 tablet (50 mg total) by mouth once daily. 90 tablet 3    POTASSIUM ORAL Take by mouth. Patient states that he takes this medication daily      UNABLE TO FIND medication name: Co Q 10 patient states that he takes this medication daily       No facility-administered encounter medications on file as of 9/12/2017.      No orders of the defined types were placed in this encounter.    Plan:      Doing well on PAP settings. Patient is compliant. Follow up in 12 months with PAP data download or call earlier if any problems.

## 2017-09-15 ENCOUNTER — OFFICE VISIT (OUTPATIENT)
Dept: FAMILY MEDICINE | Facility: CLINIC | Age: 47
End: 2017-09-15
Payer: COMMERCIAL

## 2017-09-15 VITALS
BODY MASS INDEX: 26.7 KG/M2 | TEMPERATURE: 98 F | HEART RATE: 68 BPM | DIASTOLIC BLOOD PRESSURE: 72 MMHG | WEIGHT: 190.69 LBS | HEIGHT: 71 IN | SYSTOLIC BLOOD PRESSURE: 128 MMHG

## 2017-09-15 DIAGNOSIS — M25.512 ACUTE PAIN OF LEFT SHOULDER: Primary | ICD-10-CM

## 2017-09-15 PROCEDURE — 20605 DRAIN/INJ JOINT/BURSA W/O US: CPT | Mod: LT,S$GLB,, | Performed by: FAMILY MEDICINE

## 2017-09-15 PROCEDURE — 99999 PR PBB SHADOW E&M-EST. PATIENT-LVL III: CPT | Mod: PBBFAC,,, | Performed by: FAMILY MEDICINE

## 2017-09-15 PROCEDURE — 3008F BODY MASS INDEX DOCD: CPT | Mod: S$GLB,,, | Performed by: FAMILY MEDICINE

## 2017-09-15 PROCEDURE — 99213 OFFICE O/P EST LOW 20 MIN: CPT | Mod: 25,S$GLB,, | Performed by: FAMILY MEDICINE

## 2017-09-15 PROCEDURE — 3078F DIAST BP <80 MM HG: CPT | Mod: S$GLB,,, | Performed by: FAMILY MEDICINE

## 2017-09-15 PROCEDURE — 3074F SYST BP LT 130 MM HG: CPT | Mod: S$GLB,,, | Performed by: FAMILY MEDICINE

## 2017-09-15 RX ORDER — METHYLPREDNISOLONE ACETATE 40 MG/ML
40 INJECTION, SUSPENSION INTRA-ARTICULAR; INTRALESIONAL; INTRAMUSCULAR; SOFT TISSUE
Status: COMPLETED | OUTPATIENT
Start: 2017-09-15 | End: 2017-09-15

## 2017-09-15 RX ADMIN — METHYLPREDNISOLONE ACETATE 40 MG: 40 INJECTION, SUSPENSION INTRA-ARTICULAR; INTRALESIONAL; INTRAMUSCULAR; SOFT TISSUE at 12:09

## 2017-09-15 NOTE — PROGRESS NOTES
"  Chief Complaint:    Chief Complaint   Patient presents with    Shoulder Pain     left       History of Present Illness:    Complaining of left shoulder pain started about 3 weeks back not sure what caused it.  No weakness.  Hurts to move a certain way.    ROS:  Review of Systems    Past Medical History:   Diagnosis Date    Anxiety     Depression     Diabetes mellitus     Hypertension     Hypothyroid     Sleep disorder     Stroke        Social History:  Social History     Social History    Marital status:      Spouse name: N/A    Number of children: N/A    Years of education: N/A     Social History Main Topics    Smoking status: Former Smoker     Packs/day: 1.00     Types: Vaping w/o nicotine    Smokeless tobacco: Never Used      Comment: vape     Alcohol use No    Drug use: No    Sexual activity: Yes     Partners: Female     Birth control/ protection: None     Other Topics Concern    None     Social History Narrative    None       Family History:   family history includes Cancer in his father and mother; Cataracts in his father and mother; Diabetes in his maternal grandmother; Macular degeneration in his father.    Health Maintenance   Topic Date Due    Influenza Vaccine  08/01/2017    Lipid Panel  09/07/2018    TETANUS VACCINE  05/14/2025       Physical Exam:    Vital Signs  Temp: 97.8 °F (36.6 °C)  Temp src: Tympanic  Pulse: 68  BP: 128/72  BP Location: Left arm  Patient Position: Sitting  Pain Score:   4  Pain Loc: Shoulder  Height and Weight  Height: 5' 11" (180.3 cm)  Weight: 86.5 kg (190 lb 11.2 oz)  BSA (Calculated - sq m): 2.08 sq meters  BMI (Calculated): 26.7  Weight in (lb) to have BMI = 25: 178.9]    Body mass index is 26.6 kg/m².    Physical Exam   Musculoskeletal:        Arms:      Lab Results   Component Value Date    CHOL 136 09/07/2017    CHOL 156 06/05/2017    CHOL 229 (H) 02/09/2016    TRIG 116 09/07/2017    TRIG 186 (H) 06/05/2017    TRIG 202 (H) 02/09/2016    HDL 34 " (L) 09/07/2017    HDL 34 (L) 06/05/2017    HDL 35 (L) 02/09/2016    TOTALCHOLEST 4.0 09/07/2017    TOTALCHOLEST 4.6 06/05/2017    TOTALCHOLEST 6.5 (H) 02/09/2016    NONHDLCHOL 102 09/07/2017    NONHDLCHOL 122 06/05/2017    NONHDLCHOL 194 02/09/2016       Lab Results   Component Value Date    HGBA1C 5.2 09/07/2017       Assessment:      ICD-10-CM ICD-9-CM   1. Acute pain of left shoulder M25.512 719.41         Plan:  Offered steroid injection patient excepted,  Injection done in the ac joint L shoulder, patient tolerated procedure well.  Please ice the area.  Offered steroid injection, risk of steroid injection discussed with the patient which include but not limited to,  1.  Infection  2.  Bleeding  3.  Tendon disruption  4.  Elevation of blood sugar  5.  Fat atrophy  6.  Worsening pain and other unforeseen complication.  Treatment alternatives were also discussed, patient likes to proceed with the steroid injection.  Depo-Medrol 40 mg and lidocaine 2% without epi Cc was used for injection, and the area was identified prepped and injection done sterile condition, patient tolerated procedure well.    No orders of the defined types were placed in this encounter.      Current Outpatient Prescriptions   Medication Sig Dispense Refill    armodafinil (NUVIGIL) 250 mg tablet Take 1 tablet (250 mg total) by mouth once daily. 90 tablet 1    atorvastatin (LIPITOR) 20 MG tablet Take 1 tablet (20 mg total) by mouth once daily. 90 tablet 3    buPROPion (WELLBUTRIN SR) 150 MG TBSR 12 hr tablet Take 1 tablet (150 mg total) by mouth 2 (two) times daily. 180 tablet 3    eszopiclone (LUNESTA) 2 MG Tab Take 1 tablet (2 mg total) by mouth every evening. 90 tablet 1    levothyroxine (SYNTHROID) 200 MCG tablet Take 1 tablet (200 mcg total) by mouth once daily. 30 tablet 6    lisinopril-hydrochlorothiazide (PRINZIDE,ZESTORETIC) 20-12.5 mg per tablet Take 1 tablet by mouth once daily. 90 tablet 3    magnesium gluconate 27.5 mg (500  mg) Tab Take 500 mg by mouth once daily.      MELATONIN ORAL Take by mouth. Patient states that he takes 5 mg daily      metformin (GLUCOPHAGE) 500 MG tablet Take 1 tablet (500 mg total) by mouth 2 (two) times daily with meals. (Patient taking differently: Take 500 mg by mouth 2 (two) times daily with meals. Patients that he takes this medication once daily) 180 tablet 3    metoprolol succinate (TOPROL-XL) 50 MG 24 hr tablet Take 1 tablet (50 mg total) by mouth once daily. 90 tablet 3    POTASSIUM ORAL Take by mouth. Patient states that he takes this medication daily      UNABLE TO FIND medication name: Co Q 10 patient states that he takes this medication daily       Current Facility-Administered Medications   Medication Dose Route Frequency Provider Last Rate Last Dose    methylPREDNISolone acetate injection 40 mg  40 mg Intramuscular 1 time in Clinic/HOD Delores Lazar MD           There are no discontinued medications.    No Follow-up on file.      Dr Delores Lazar MD    Disclaimer: This note is prepared using voice recognition system and as such is likely to have errors and is not proof read.

## 2017-11-03 RX ORDER — ESZOPICLONE 2 MG/1
TABLET, FILM COATED ORAL
Qty: 90 TABLET | Refills: 1 | Status: SHIPPED | OUTPATIENT
Start: 2017-11-03 | End: 2017-11-06 | Stop reason: SDUPTHER

## 2017-11-06 RX ORDER — ESZOPICLONE 2 MG/1
2 TABLET, FILM COATED ORAL NIGHTLY
Qty: 90 TABLET | Refills: 1 | Status: SHIPPED | OUTPATIENT
Start: 2017-11-06 | End: 2018-04-25 | Stop reason: SDUPTHER

## 2017-11-06 NOTE — TELEPHONE ENCOUNTER
----- Message from Cecy Culver sent at 11/6/2017 11:05 AM CST -----  Patient states his pharmacy submitted a refill request for Lunesta and it was declined. Patient would like to consult with nurse. Please adv/call [phone].//thanks. cw

## 2017-12-26 ENCOUNTER — PATIENT MESSAGE (OUTPATIENT)
Dept: FAMILY MEDICINE | Facility: CLINIC | Age: 47
End: 2017-12-26

## 2017-12-28 ENCOUNTER — OFFICE VISIT (OUTPATIENT)
Dept: ORTHOPEDICS | Facility: CLINIC | Age: 47
End: 2017-12-28
Payer: COMMERCIAL

## 2017-12-28 ENCOUNTER — HOSPITAL ENCOUNTER (OUTPATIENT)
Dept: RADIOLOGY | Facility: HOSPITAL | Age: 47
Discharge: HOME | End: 2017-12-28
Attending: ORTHOPAEDIC SURGERY
Payer: COMMERCIAL

## 2017-12-28 VITALS
HEIGHT: 71 IN | RESPIRATION RATE: 15 BRPM | DIASTOLIC BLOOD PRESSURE: 82 MMHG | BODY MASS INDEX: 26.7 KG/M2 | HEART RATE: 78 BPM | WEIGHT: 190.69 LBS | SYSTOLIC BLOOD PRESSURE: 115 MMHG

## 2017-12-28 DIAGNOSIS — M75.22 TENDONITIS OF UPPER BICEPS TENDON OF LEFT SHOULDER: Primary | ICD-10-CM

## 2017-12-28 DIAGNOSIS — M25.512 LEFT SHOULDER PAIN, UNSPECIFIED CHRONICITY: Primary | ICD-10-CM

## 2017-12-28 DIAGNOSIS — M19.019 ACROMIOCLAVICULAR JOINT ARTHRITIS: ICD-10-CM

## 2017-12-28 DIAGNOSIS — M25.512 LEFT SHOULDER PAIN, UNSPECIFIED CHRONICITY: ICD-10-CM

## 2017-12-28 PROCEDURE — 20610 DRAIN/INJ JOINT/BURSA W/O US: CPT | Mod: LT,S$GLB,, | Performed by: ORTHOPAEDIC SURGERY

## 2017-12-28 PROCEDURE — 73030 X-RAY EXAM OF SHOULDER: CPT | Mod: TC,PO,LT

## 2017-12-28 PROCEDURE — 99999 PR PBB SHADOW E&M-EST. PATIENT-LVL III: CPT | Mod: PBBFAC,,, | Performed by: ORTHOPAEDIC SURGERY

## 2017-12-28 PROCEDURE — 73030 X-RAY EXAM OF SHOULDER: CPT | Mod: 26,LT,, | Performed by: RADIOLOGY

## 2017-12-28 PROCEDURE — 99204 OFFICE O/P NEW MOD 45 MIN: CPT | Mod: 25,S$GLB,, | Performed by: ORTHOPAEDIC SURGERY

## 2017-12-28 RX ORDER — TRIAMCINOLONE ACETONIDE 40 MG/ML
40 INJECTION, SUSPENSION INTRA-ARTICULAR; INTRAMUSCULAR
Status: COMPLETED | OUTPATIENT
Start: 2017-12-28 | End: 2017-12-28

## 2017-12-28 RX ORDER — DICLOFENAC SODIUM 10 MG/G
2 GEL TOPICAL 4 TIMES DAILY
Qty: 1 TUBE | Refills: 2 | Status: SHIPPED | OUTPATIENT
Start: 2017-12-28 | End: 2020-10-05

## 2017-12-28 RX ADMIN — TRIAMCINOLONE ACETONIDE 40 MG: 40 INJECTION, SUSPENSION INTRA-ARTICULAR; INTRAMUSCULAR at 10:12

## 2017-12-28 NOTE — PROGRESS NOTES
Subjective:     Patient ID: Wilman Neri is a 47 y.o. male.    Chief Complaint: Pain of the Left Shoulder    L shoulder pain since approximately august. Unsure of specific injury but remembers slowly having pain with lifting.      Shoulder Injury    The pain is present in the left shoulder. This is a new problem. The current episode started more than 1 month ago. There has been no history of extremity trauma. The injury was the result of a lifting action while at work. The problem occurs constantly. The problem has been unchanged. The pain is at a severity of 3/10. Associated symptoms include joint locking and a limited range of motion. Pertinent negatives include no fever or numbness. The symptoms are aggravated by twisting and activity (stabbing pain). He has tried brace/corset (sling, helps take weight off) for the symptoms. The treatment provided moderate relief. Physical therapy was not tried.      Past Medical History:   Diagnosis Date    Anxiety     Depression     Diabetes mellitus     Hypertension     Hypothyroid     Sleep disorder     Stroke      Past Surgical History:   Procedure Laterality Date    BACK SURGERY      2 juan pablo 6 bolts lower back     EXTERNAL EAR SURGERY      MODIFIED RADICAL MASTECTOMY W/ AXILLARY LYMPH NODE DISSECTION       Family History   Problem Relation Age of Onset    Cancer Mother     Cataracts Mother     Cancer Father     Cataracts Father     Macular degeneration Father     Diabetes Maternal Grandmother     Blindness Neg Hx     Glaucoma Neg Hx     Hypertension Neg Hx     Retinal detachment Neg Hx     Strabismus Neg Hx     Stroke Neg Hx     Thyroid disease Neg Hx      Social History     Social History    Marital status:      Spouse name: N/A    Number of children: N/A    Years of education: N/A     Occupational History    Not on file.     Social History Main Topics    Smoking status: Former Smoker     Packs/day: 1.00     Types: Vaping w/o nicotine     Smokeless tobacco: Never Used      Comment: vape     Alcohol use No    Drug use: No    Sexual activity: Yes     Partners: Female     Birth control/ protection: None     Other Topics Concern    Not on file     Social History Narrative    No narrative on file     Medication List with Changes/Refills   Current Medications    ARMODAFINIL (NUVIGIL) 250 MG TABLET    Take 1 tablet (250 mg total) by mouth once daily.    ATORVASTATIN (LIPITOR) 20 MG TABLET    Take 1 tablet (20 mg total) by mouth once daily.    BUPROPION (WELLBUTRIN SR) 150 MG TBSR 12 HR TABLET    Take 1 tablet (150 mg total) by mouth 2 (two) times daily.    ESZOPICLONE (LUNESTA) 2 MG TAB    Take 1 tablet (2 mg total) by mouth every evening.    LEVOTHYROXINE (SYNTHROID) 200 MCG TABLET    Take 1 tablet (200 mcg total) by mouth once daily.    LISINOPRIL-HYDROCHLOROTHIAZIDE (PRINZIDE,ZESTORETIC) 20-12.5 MG PER TABLET    Take 1 tablet by mouth once daily.    MAGNESIUM GLUCONATE 27.5 MG (500 MG) TAB    Take 500 mg by mouth once daily.    MELATONIN ORAL    Take by mouth. Patient states that he takes 5 mg daily    METFORMIN (GLUCOPHAGE) 500 MG TABLET    Take 1 tablet (500 mg total) by mouth 2 (two) times daily with meals.    METOPROLOL SUCCINATE (TOPROL-XL) 50 MG 24 HR TABLET    Take 1 tablet (50 mg total) by mouth once daily.    POTASSIUM ORAL    Take by mouth. Patient states that he takes this medication daily    UNABLE TO FIND    medication name: Co Q 10 patient states that he takes this medication daily     Review of patient's allergies indicates:  No Known Allergies  Review of Systems   Constitution: Negative for fever and night sweats.   HENT: Negative for hearing loss.    Eyes: Negative for blurred vision and visual disturbance.   Cardiovascular: Negative for chest pain and leg swelling.   Respiratory: Negative for shortness of breath.    Endocrine: Negative for polyuria.   Hematologic/Lymphatic: Negative for bleeding problem.   Skin: Negative for  rash.   Musculoskeletal: Positive for joint pain. Negative for back pain, joint swelling, muscle cramps and muscle weakness.   Gastrointestinal: Negative for melena.   Genitourinary: Negative for hematuria.   Neurological: Negative for loss of balance, numbness and paresthesias.   Psychiatric/Behavioral: Negative for altered mental status.       Objective:   Body mass index is 26.6 kg/m².  Vitals:    12/28/17 0900   BP: 115/82   Pulse: 78   Resp: 15       General: Wilman is well-developed, well-nourished, appears stated age, in no acute distress, alert and oriented to time, place and person.       General    Vitals reviewed.  Constitutional: He is oriented to person, place, and time. He appears well-developed and well-nourished. No distress.   HENT:   Nose: Nose normal.   Mouth/Throat: No oropharyngeal exudate.   Eyes: Pupils are equal, round, and reactive to light. Right eye exhibits no discharge. Left eye exhibits no discharge.   Neck: Normal range of motion.   Cardiovascular: Normal rate and intact distal pulses.    Pulmonary/Chest: Effort normal and breath sounds normal. No respiratory distress.   Neurological: He is alert and oriented to person, place, and time. He has normal reflexes. He displays normal reflexes. No cranial nerve deficit. Coordination normal.   Psychiatric: He has a normal mood and affect. His behavior is normal. Judgment and thought content normal.         Right Shoulder Exam     Inspection/Observation   Swelling: absent  Bruising: absent  Scars: absent  Deformity: absent  Scapular Winging: absent  Scapular Dyskinesia: negative  Atrophy: absent    Tenderness   The patient is experiencing no tenderness.        Range of Motion   Active Abduction:  90 normal   Passive Abduction:  100 normal   Extension:  0 normal   Forward Flexion:  180 normal   Forward Elevation: 180 normal  Adduction: 40 normal  External Rotation 0 degrees:  50 normal   External Rotation 90 degrees: 90 normal  Internal  Rotation 0 degrees:  T8 normal   Internal Rotation 90 degrees:  30 normal     Tests & Signs   Apprehension: negative  Cross Arm: negative  Drop Arm: negative  Hawkin's test: negative  Impingement: negative  Sulcus: absent  Anterosuperior Escape: negative  Lag Sign 0 degrees: negative  Lag Sign 90 degrees: negative  Lift Off Sign: negative  Belly Press: negative  Active Compression Test ([place]'s Sign): negative  Yergason's Test: negative  Speed's Test: negative  Anterior Drawer Test: 1+   Posterior Drawer Test: 1+  Relocation 90 degrees: negative  Relocation > 90 degrees: negative  Bear Hug: negative  Moving Valgus: negative  Jerk Test: negative    Other   Sensation: normal    Left Shoulder Exam     Inspection/Observation   Swelling: absent  Bruising: absent  Scars: absent  Deformity: absent  Scapular Winging: absent  Scapular Dyskinesia: negative  Atrophy: absent    Tenderness   The patient is tender to palpation of the acromioclavicular joint and biceps tendon.    Range of Motion   Active Abduction:  90 normal   Passive Abduction:  100 normal   Extension:  0 normal   Forward Flexion:  180 normal   Forward Elevation: 180 normal  Adduction: 40 normal  External Rotation 0 degrees:  50 normal   External Rotation 90 degrees: 90 normal  Internal Rotation 0 degrees:  T8 normal   Internal Rotation 90 degrees:  30 normal     Tests & Signs   Apprehension: negative  Cross Arm: negative  Drop Arm: negative  Hawkin's test: negative  Impingement: negative  Sulcus: absent  Anterosuperior Escape: negative  Lag Sign 0 degrees: negative  Lag Sign 90 degrees: negative  Lift Off Sign: negative  Belly Press: negative  Active Compression Test ([place]'s Sign): positive  Yergasons's Test: negative  Speed's Test: positive  Anterior Drawer Test: 1+  Posterior Drawer Test: 1+  Relocation 90 degrees: negative  Relocation > 90 degrees: negative  Bear Hug: negative  Moving Valgus: negative  Jerk Test: negative    Other   Sensation: normal        Muscle Strength   Right Upper Extremity   Shoulder Abduction: 5/5   Shoulder Internal Rotation: 5/5   Shoulder External Rotation: 5/5   Supraspinatus: 5/5/5   Subscapularis: 5/5/5   Biceps: 5/5/5   Left Upper Extremity  Shoulder Abduction: 5/5   Shoulder Internal Rotation: 5/5   Shoulder External Rotation: 5/5   Supraspinatus: 5/5/5   Subscapularis: 5/5/5   Biceps: 5/5/5     Reflexes     Left Side  Biceps:  2+  Triceps:  2+  Brachioradialis:  2+    Right Side   Biceps:  2+  Triceps:  2+  Brachioradialis:  2+    Vascular Exam     Right Pulses      Radial:                    2+      Left Pulses      Radial:                    2+      Capillary Refill  Right Hand: normal capillary refill  Left Hand: normal capillary refill    Xray shoulder taken and reviewed personally by me. There is no evidence of fracture or dislocation about the shoulder per my review.  There is degenerative change at the a-c joint.      Assessment:     Encounter Diagnoses   Name Primary?    Tendonitis of upper biceps tendon of left shoulder Yes    Acromioclavicular joint arthritis         Plan:     1. PROCEDURE NOTE:  After cortisone consent and post-injection information was given, the shoulder was prepped with betadyne and alcohol. The left subacromial space was injected with 2 cc 40 mg kenalog and 4 cc lidocaine and 4 cc .5% marcaine.   Bandaid was applied. Patient was reminded to rest with RICE for 48 hours post injection and to call clinic immediately for any adverse side effects as explained in clinic today.    2. PT    3. Voltaren gel

## 2017-12-28 NOTE — LETTER
December 28, 2017      Delores Lazar MD  [address] [address] [address]           [facility]  [address] [facility] [address]  Phone: [phone]  Fax: [phone]          Patient: Wilman Neri   MR Number: [id number]   YOB: 1970   Date of Visit: 12/28/2017       Dear Dr. Delores Lazar:    Thank you for referring Wilman Neri to me for evaluation. Attached you will find relevant portions of my assessment and plan of care.    If you have questions, please do not hesitate to call me. I look forward to following Wilman Neri along with you.    Sincerely,    Bonifacio Liang MD    Enclosure  CC:  No Recipients    If you would like to receive this communication electronically, please contact [email or web address] or [phone] to request more information on [employer] Link access.    For providers and/or their staff who would like to refer a patient to [facility], please contact us through our one-stop-shop provider referral line, [facility], at [phone].    If you feel you have received this communication in error or would no longer like to receive these types of communications, please e-mail [email or web address]

## 2018-01-02 ENCOUNTER — PATIENT MESSAGE (OUTPATIENT)
Dept: ORTHOPEDICS | Facility: CLINIC | Age: 48
End: 2018-01-02

## 2018-01-10 DIAGNOSIS — M75.22 TENDONITIS OF UPPER BICEPS TENDON OF LEFT SHOULDER: Primary | ICD-10-CM

## 2018-01-22 NOTE — PROGRESS NOTES
Occupational Therapy Shoulder Evaluation      Patient:  Wilman Neri  Date of Evaluation: 1/23/2018  MRN: [id number]  Referring Physician:  Keegan Gamez Sr., MD  Diagnosis:   1. Tendonitis of upper biceps tendon of left shoulder       Referral Orders:   Eval and treat, MARIELY     Start Time: 8:15 am  End Time: 9:00 am             Occupation:    Working presently:Light duty      Past Medical History/ Physical Systems Review:   Past Medical History:   Diagnosis Date    Anxiety     Depression     Diabetes mellitus     Hypertension     Hypothyroid     Sleep disorder     Stroke      Medications: Wilman Neri has a current medication list which includes the following prescription(s): armodafinil, atorvastatin, bupropion, diclofenac sodium, eszopiclone, levothyroxine, lisinopril-hydrochlorothiazide, magnesium gluconate, melatonin, metformin, metoprolol succinate, potassium, and UNABLE TO FIND.    Date of onset: August 2018  Mechanism of Injury:   Pt reports he thought he strained his shoulder when he was lifting some equipment. Pain has not decreased. X rays showed degeneration of AC jt. Pt received 2 injections in subacromial space. Has been using voltaren gel. Pt reports that MD thinks he tore a muscle in his shoulder.     Visit 1 of 1; Expires 1/10/2019    Hand dominance: Right    Subjective:    Pain:  During no work/ at rest  1-2 out of 10  While working/ with activity :   5-6 out of 10  Sleeping/at night:  disturbs sleep, unable to sleep on shoulder   Location of Pain:  anteriiir shoulder, over AC jt, and along biceps tendon    Objective:  Observation/Inspection:  Pt presented with sling donned and says he wears it most of the time. Shoulders rounded forward, slight scapular abduction and upper rotation    Range of Motion:                                             Left  UE                 Right UE                                                            Shoulder Flexion                     0/162      0/180   Shoulder Abduction                0/132      0/162   Shoulder Extension                0/41         0/72      Shoulder Internal Rotation      0/55        0/82      Shoulder External Rotation     0/100     0/105               Shoulder Flexion  5/5  Shoulder Extension  4+/5  Shoulder Abduction  5/5  Shoulder Adduction NT/5  Internal Rotation 5/5  External Rotation 4/5    Painful Arc: no    Special Tests:  Positive:  Speed's, pain with resisted supination and resisted elbow flexion, all FA positions    Palpation: (for pain)  Positive:  over AC jt, along bicceps tendon, medial border L scapula, anterior shoulder      Functional Limitations and Goal:   Self Care: Limited use of LUE for ADLs, bathing, dressing, overhead ADLS, buttoning pants, pulling pants up, pushing sit to stand/weightbearing  Work/Activity : Limited use of LUE for using tools, pulling wires, overhead running of cables, up and down ladders  Leisure:  Limited use of LUE for woodworking, house remodeling    Quick DASH score: 45%      Treatment included :  OT evaluation and instruction in written HEP including bicep stretch, postural exercises  Modalities: Moist Heat applied to L shoulder x 10 min to decrease pain and  increase blood flow and tissue elasticity prior to treatment.   Manual Therapy: (0 min) none today  There Ex: (15 min)    -Scapular squeezes 10x   -Shoulder rolls for postural correction 10x   -Bicep stretch over back of chair, palm and palm down, 20-30 sec holds  Education: postures roll on shoulder pain, HEP, sleeping position, activity modifications and aggravating factors, use of cold x 15 min several times a day, limiting use of sling     Patient demonstrated good understanding of HEP/modality use for pain management.     Assessment:   Wilman Neri was  referred to Outpatient Occupational Therapy with diagnosis of      1. Tendonitis of upper biceps tendon of left shoulder       and presents with limitations as  described in problem list below. These impairments are limiting the patient's ability to perform ADLs, work, and leisure tasks without limitations.   Patient will benefit from Occupational Therapy intervention to address these limitations to improve overall functional use of the L upper extremity.    Co-morbidities which may impact the plan of care and potentially impede the patient's progress in therapy include:Anxiety, Depression, Diabetes  Patients CLINICAL PRESENTATION is STABLE.     Problem List:    Decreased ROM  Decreased strength,  Decreased functional use,  Increased pain       Short Term Goals:  ( 4 weeks)   1) Patient will be independent with HEP and modalities for pain management.  2) Pt will exhibit increased flexion and abduction by  10 degrees to enable dressing, bathing.   3) Pt will report improved sleep.  4) Pt will report decreased pain to 4  out of 10 at worst.         Long Term Goals (8 weeks)  1) Patient will be return to PLOF, including work activities.  2) Pt will exhibit increased AROM  At 150-160 degrees abduction, extension to 50-60 degrees.   3) Pt will exhibit 5/5 MMT for Er and extension to enable pt to lift heavy objects at work.   4) Pt will report decreased pain to 2 out of 10 with work.       Plan:  Patient will benefit form skilled Occupational Therapy 2  times per week for 8  weeks  To address the above deficits and achieve the established goals.    Treatment to include Modalities for pain management (moist heat, cold packs, Ultrasound, electrical stimulation),  Manual therapy/ Joint Mobilizations,Therapeutic exercises / activities, pt education, HEP,  as well as any other treatments deemed necessary in order to maximize painfree functional use of  L  UE.             I certify the need for these services furnished under this plan of treatment and while under my care.  TOM Carter, YADIRA      ____________________________________                          __________________  Physician/Referring Practitioner                                               Date of Signature

## 2018-01-23 ENCOUNTER — CLINICAL SUPPORT (OUTPATIENT)
Dept: REHABILITATION | Facility: HOSPITAL | Age: 48
End: 2018-01-23
Attending: ORTHOPAEDIC SURGERY
Payer: COMMERCIAL

## 2018-01-23 DIAGNOSIS — M75.22 TENDONITIS OF UPPER BICEPS TENDON OF LEFT SHOULDER: Primary | ICD-10-CM

## 2018-01-23 PROCEDURE — 97165 OT EVAL LOW COMPLEX 30 MIN: CPT

## 2018-01-23 PROCEDURE — 97110 THERAPEUTIC EXERCISES: CPT

## 2018-02-02 ENCOUNTER — CLINICAL SUPPORT (OUTPATIENT)
Dept: REHABILITATION | Facility: HOSPITAL | Age: 48
End: 2018-02-02
Attending: ORTHOPAEDIC SURGERY
Payer: COMMERCIAL

## 2018-02-02 DIAGNOSIS — M75.22 TENDONITIS OF UPPER BICEPS TENDON OF LEFT SHOULDER: Primary | ICD-10-CM

## 2018-02-02 PROCEDURE — 97110 THERAPEUTIC EXERCISES: CPT

## 2018-02-02 NOTE — PROGRESS NOTES
Occupational Therapy Shoulder Daily Treatment Note      Patient:  Wilman Neri  Date of Evaluation: 2/2/2018  MRN: [id number]  Referring Physician:  Keegan Gamez Sr., MD  Diagnosis:   1. Tendonitis of upper biceps tendon of left shoulder       Referral Orders:   Eval and treat, AROM, US    Start Time: 7:45 am  End Time: 8:40 am             Occupation:    Working presently:Light duty        Date of onset: August 2018  Mechanism of Injury:   Pt reports he thought he strained his shoulder when he was lifting some equipment. Pain has not decreased. X rays showed degeneration of AC jt. Pt received 2 injections in subacromial space. Has been using voltaren gel. Pt reports that MD thinks he tore a muscle in his shoulder.     Visit 2 of 20; Expires 12/31/2018    Hand dominance: Right    Subjective: Pt reports improved sleep and decreased pain and compliance with HEP.     Pain:  During no work/ at rest  1-2 out of 10  While working/ with activity :   5-6 out of 10  Sleeping/at night:  disturbs sleep, unable to sleep on shoulder   Location of Pain:  anteriiir shoulder, over AC jt, and along biceps tendon    Objective:    Range of Motion:                                             Left  UE                 Right UE                                                            Shoulder Flexion                    0/162      0/180   Shoulder Abduction                0/132      0/162   Shoulder Extension                0/41         0/72      Shoulder Internal Rotation      0/55        0/82      Shoulder External Rotation     0/100     0/105               Shoulder Flexion  5/5  Shoulder Extension  4+/5  Shoulder Abduction  5/5  Shoulder Adduction NT/5  Internal Rotation 5/5  External Rotation 4/5        Functional Limitations and Goal:   Self Care: Limited use of LUE for ADLs, bathing, dressing, overhead ADLS, buttoning pants, pulling pants up, pushing sit to stand/weightbearing  Work/Activity : Limited use of LUE  for using tools, pulling wires, overhead running of cables, up and down ladders  Leisure:  Limited use of LUE for woodworking, house remodeling        Treatment included :    Modalities: Moist Heat applied to L shoulder x 10 min to decrease pain and  increase blood flow and tissue elasticity prior to treatment.   Manual Therapy: (2 min) GHJ mobs, posterior and inferior glides  There Ex: (42 min)    -Passive stretch end range flexion and abduction and IR   -Dowel exercises 10x ea for flexion, abduction   -Prone scapula exercises 0, 45, 90 2/10 ea   -Rows Red theraband 3/10   -ER in sidelying 2# 2/10   -Bicep stretch over back of chair, palm and palm down, 20-30 sec holds    Assessment: [place] overhead painful, so deferred. Scapular correction reduces pain for other exercises. Initiated light strengthening with caution to minimize flare up of bicep tendonitis.     Problem List:    Decreased ROM  Decreased strength,  Decreased functional use,  Increased pain       Short Term Goals:  ( 4 weeks)   1) Patient will be independent with HEP and modalities for pain management. (met)  2) Pt will exhibit increased flexion and abduction by  10 degrees to enable dressing, bathing.   3) Pt will report improved sleep.  4) Pt will report decreased pain to 4  out of 10 at worst.         Long Term Goals (8 weeks)  1) Patient will be return to PLOF, including work activities.  2) Pt will exhibit increased AROM  At 150-160 degrees abduction, extension to 50-60 degrees.   3) Pt will exhibit 5/5 MMT for Er and extension to enable pt to lift heavy objects at work.   4) Pt will report decreased pain to 2 out of 10 with work.       Plan:  Continue skilled Occupational Therapy 2  times per week for 8  weeks  To address the above deficits and achieve the established goals.    Treatment to include Modalities for pain management (moist heat, cold packs, Ultrasound, electrical stimulation),  Manual therapy/ Joint Mobilizations,Therapeutic  exercises / activities, pt education, HEP,  as well as any other treatments deemed necessary in order to maximize painfree functional use of  L  UE.             I certify the need for these services furnished under this plan of treatment and while under my care.  TOM Carter, MINIT      ____________________________________                         __________________  Physician/Referring Practitioner                                               Date of Signature

## 2018-02-08 ENCOUNTER — CLINICAL SUPPORT (OUTPATIENT)
Dept: REHABILITATION | Facility: HOSPITAL | Age: 48
End: 2018-02-08
Attending: ORTHOPAEDIC SURGERY
Payer: COMMERCIAL

## 2018-02-08 DIAGNOSIS — M75.22 TENDONITIS OF UPPER BICEPS TENDON OF LEFT SHOULDER: Primary | ICD-10-CM

## 2018-02-08 PROCEDURE — 97140 MANUAL THERAPY 1/> REGIONS: CPT

## 2018-02-08 PROCEDURE — 97110 THERAPEUTIC EXERCISES: CPT

## 2018-02-08 NOTE — PROGRESS NOTES
Occupational Therapy Shoulder Daily Treatment Note      Patient:  Wilman Neri  Date of Evaluation: 2/8/2018  MRN: [id number]  Referring Physician:  Keegan Gamez Sr., MD  Diagnosis:   No diagnosis found.  Referral Orders:   Eval and treat, AROM, US    Start Time: 3:00 pm  End Time: 3:50 pm              Occupation:    Working presently:Light duty        Date of onset: August 2018  Mechanism of Injury:   Pt reports he thought he strained his shoulder when he was lifting some equipment. Pain has not decreased. X rays showed degeneration of AC jt. Pt received 2 injections in subacromial space. Has been using voltaren gel. Pt reports that MD thinks he tore a muscle in his shoulder.     Visit 3 of 20; Expires 12/31/2018    Hand dominance: Right    Subjective: Pt reports he continued improvement in sleep but fluctuating pain levels, usually dependent on amount of overhead work performed at job.     Pain:  During no work/ at rest  1-2 out of 10  While working/ with activity :   5-6 out of 10  Sleeping/at night:  disturbs sleep, unable to sleep on shoulder   Location of Pain:  anteriiir shoulder, over AC jt, and along biceps tendon    Objective:    Range of Motion:                                             Left  UE                 Right UE                                                            Shoulder Flexion                    0/162      0/180   Shoulder Abduction                0/132      0/162   Shoulder Extension                0/41         0/72      Shoulder Internal Rotation      0/55        0/82      Shoulder External Rotation     0/100     0/105               Shoulder Flexion  5/5  Shoulder Extension  4+/5  Shoulder Abduction  5/5  Shoulder Adduction NT/5  Internal Rotation 5/5  External Rotation 4/5        Functional Limitations and Goal:   Self Care: Limited use of LUE for ADLs, bathing, dressing, overhead ADLS, buttoning pants, pulling pants up, pushing sit to  stand/weightbearing  Work/Activity : Limited use of LUE for using tools, pulling wires, overhead running of cables, up and down ladders  Leisure:  Limited use of LUE for woodworking, house remodeling        Treatment included :    Modalities: Moist Heat applied to L shoulder x 10 min to decrease pain and  increase blood flow and tissue elasticity prior to treatment.   Manual Therapy: (10 min) GHJ mobs, posterior and inferior glides. IASTM to bicep x 8 min  There Ex: (30 min)    -Passive stretch end range flexion and abduction and IR   -Dowel exercises 10x ea for flexion, abduction   -Prone scapula exercises 0, 45, 90 2/10 ea (NT)   -Pulley system rows 20# ea hand, 3/10; extensions 10# each hand 3/10   -ER in sidelying 2# 2/10 (NT)   -Bicep stretch over back of chair, palm and palm down, 20-30 sec holds   -Supination isometrics 5 sec holds 10 reps   -Elbow flexion isometrics, 5 sec holds, 10 reps each FA position     Assessment: Initiated IASTM to bicep. Pt continues with excellent PROM with painful arc noted today in supine.       Problem List:    Decreased ROM  Decreased strength,  Decreased functional use,  Increased pain       Short Term Goals:  ( 4 weeks)   1) Patient will be independent with HEP and modalities for pain management. (met)  2) Pt will exhibit increased flexion and abduction by  10 degrees to enable dressing, bathing.   3) Pt will report improved sleep.  4) Pt will report decreased pain to 4  out of 10 at worst.         Long Term Goals (8 weeks)  1) Patient will be return to PLOF, including work activities.  2) Pt will exhibit increased AROM  At 150-160 degrees abduction, extension to 50-60 degrees.   3) Pt will exhibit 5/5 MMT for Er and extension to enable pt to lift heavy objects at work.   4) Pt will report decreased pain to 2 out of 10 with work.       Plan:  Continue skilled Occupational Therapy 2  times per week for 8  weeks  To address the above deficits and achieve the established  goals.    Treatment to include Modalities for pain management (moist heat, cold packs, Ultrasound, electrical stimulation),  Manual therapy/ Joint Mobilizations,Therapeutic exercises / activities, pt education, HEP,  as well as any other treatments deemed necessary in order to maximize painfree functional use of  L  UE.             I certify the need for these services furnished under this plan of treatment and while under my care.  TOM Carter, MINIT      ____________________________________                         __________________  Physician/Referring Practitioner                                               Date of Signature

## 2018-02-09 DIAGNOSIS — E11.9 TYPE 2 DIABETES MELLITUS WITHOUT COMPLICATION: ICD-10-CM

## 2018-02-16 RX ORDER — ESZOPICLONE 2 MG/1
TABLET, FILM COATED ORAL
Qty: 90 TABLET | Refills: 1 | OUTPATIENT
Start: 2018-02-16

## 2018-02-16 RX ORDER — ARMODAFINIL 250 MG/1
TABLET ORAL
Qty: 90 TABLET | Refills: 1 | Status: SHIPPED | OUTPATIENT
Start: 2018-02-16 | End: 2019-01-29 | Stop reason: SDUPTHER

## 2018-03-23 DIAGNOSIS — E11.9 TYPE 2 DIABETES MELLITUS WITHOUT COMPLICATION: ICD-10-CM

## 2018-04-24 ENCOUNTER — PATIENT OUTREACH (OUTPATIENT)
Dept: ADMINISTRATIVE | Facility: HOSPITAL | Age: 48
End: 2018-04-24

## 2018-04-25 ENCOUNTER — OFFICE VISIT (OUTPATIENT)
Dept: FAMILY MEDICINE | Facility: CLINIC | Age: 48
End: 2018-04-25
Payer: COMMERCIAL

## 2018-04-25 ENCOUNTER — LAB VISIT (OUTPATIENT)
Dept: LAB | Facility: HOSPITAL | Age: 48
End: 2018-04-25
Attending: FAMILY MEDICINE
Payer: COMMERCIAL

## 2018-04-25 VITALS
BODY MASS INDEX: 27.89 KG/M2 | DIASTOLIC BLOOD PRESSURE: 86 MMHG | OXYGEN SATURATION: 99 % | TEMPERATURE: 98 F | SYSTOLIC BLOOD PRESSURE: 126 MMHG | HEIGHT: 71 IN | WEIGHT: 199.19 LBS | HEART RATE: 79 BPM

## 2018-04-25 DIAGNOSIS — I10 ESSENTIAL HYPERTENSION: ICD-10-CM

## 2018-04-25 DIAGNOSIS — F43.0 ACUTE STRESS REACTION: ICD-10-CM

## 2018-04-25 DIAGNOSIS — E11.9 CONTROLLED TYPE 2 DIABETES MELLITUS WITHOUT COMPLICATION, WITHOUT LONG-TERM CURRENT USE OF INSULIN: ICD-10-CM

## 2018-04-25 DIAGNOSIS — E03.4 HYPOTHYROIDISM DUE TO ACQUIRED ATROPHY OF THYROID: ICD-10-CM

## 2018-04-25 DIAGNOSIS — G47.26 CIRCADIAN RHYTHM SLEEP DISORDER, SHIFT WORK TYPE: ICD-10-CM

## 2018-04-25 DIAGNOSIS — I10 ESSENTIAL HYPERTENSION: Primary | ICD-10-CM

## 2018-04-25 LAB
ALBUMIN SERPL BCP-MCNC: 4.2 G/DL
ALP SERPL-CCNC: 46 U/L
ALT SERPL W/O P-5'-P-CCNC: 29 U/L
ANION GAP SERPL CALC-SCNC: 11 MMOL/L
AST SERPL-CCNC: 21 U/L
BILIRUB SERPL-MCNC: 0.7 MG/DL
BUN SERPL-MCNC: 16 MG/DL
CALCIUM SERPL-MCNC: 10 MG/DL
CHLORIDE SERPL-SCNC: 103 MMOL/L
CHOLEST SERPL-MCNC: 164 MG/DL
CHOLEST/HDLC SERPL: 4.2 {RATIO}
CO2 SERPL-SCNC: 23 MMOL/L
CREAT SERPL-MCNC: 0.9 MG/DL
EST. GFR  (AFRICAN AMERICAN): >60 ML/MIN/1.73 M^2
EST. GFR  (NON AFRICAN AMERICAN): >60 ML/MIN/1.73 M^2
ESTIMATED AVG GLUCOSE: 108 MG/DL
GLUCOSE SERPL-MCNC: 89 MG/DL
HBA1C MFR BLD HPLC: 5.4 %
HDLC SERPL-MCNC: 39 MG/DL
HDLC SERPL: 23.8 %
LDLC SERPL CALC-MCNC: 94.6 MG/DL
NONHDLC SERPL-MCNC: 125 MG/DL
POTASSIUM SERPL-SCNC: 3.9 MMOL/L
PROT SERPL-MCNC: 7.3 G/DL
SODIUM SERPL-SCNC: 137 MMOL/L
TRIGL SERPL-MCNC: 152 MG/DL
TSH SERPL DL<=0.005 MIU/L-ACNC: 1.8 UIU/ML

## 2018-04-25 PROCEDURE — 99214 OFFICE O/P EST MOD 30 MIN: CPT | Mod: S$GLB,,, | Performed by: FAMILY MEDICINE

## 2018-04-25 PROCEDURE — 36415 COLL VENOUS BLD VENIPUNCTURE: CPT | Mod: PO

## 2018-04-25 PROCEDURE — 83036 HEMOGLOBIN GLYCOSYLATED A1C: CPT

## 2018-04-25 PROCEDURE — 84443 ASSAY THYROID STIM HORMONE: CPT

## 2018-04-25 PROCEDURE — 80061 LIPID PANEL: CPT

## 2018-04-25 PROCEDURE — 99999 PR PBB SHADOW E&M-EST. PATIENT-LVL III: CPT | Mod: PBBFAC,,, | Performed by: FAMILY MEDICINE

## 2018-04-25 PROCEDURE — 80053 COMPREHEN METABOLIC PANEL: CPT

## 2018-04-25 RX ORDER — ESZOPICLONE 2 MG/1
2 TABLET, FILM COATED ORAL NIGHTLY
Qty: 90 TABLET | Refills: 1 | Status: SHIPPED | OUTPATIENT
Start: 2018-04-25 | End: 2018-10-23 | Stop reason: SDUPTHER

## 2018-04-25 RX ORDER — PAROXETINE HYDROCHLORIDE 20 MG/1
20 TABLET, FILM COATED ORAL EVERY MORNING
Qty: 30 TABLET | Refills: 11 | Status: SHIPPED | OUTPATIENT
Start: 2018-04-25 | End: 2019-03-28 | Stop reason: SDUPTHER

## 2018-04-25 NOTE — PROGRESS NOTES
Chief Complaint:    Chief Complaint   Patient presents with    Medication Refill    Anxiety    Shoulder Pain       History of Present Illness:  Patient presents today he is here for follow-up,  He says he is under lot of stress lately he's got a bad boss which is putting him through a lot of stress is very anxious having trouble with sleep.  He has diabetes on metformin takes it only once a day.  His blood pressure is normal slightly elevated on arrival but on recheck was okay.  Other medical problems are stable        ROS:  Review of Systems   Constitutional: Negative for activity change, chills, fatigue, fever and unexpected weight change.   HENT: Negative for congestion, ear discharge, ear pain, hearing loss, postnasal drip and rhinorrhea.    Eyes: Negative for pain and visual disturbance.   Respiratory: Negative for cough, chest tightness and shortness of breath.    Cardiovascular: Negative for chest pain and palpitations.   Gastrointestinal: Negative for abdominal pain, diarrhea and vomiting.   Endocrine: Negative for heat intolerance.   Genitourinary: Negative for dysuria, flank pain, frequency and hematuria.   Musculoskeletal: Negative for back pain, gait problem and neck pain.   Skin: Negative for color change and rash.   Neurological: Negative for dizziness, tremors, seizures, numbness and headaches.   Psychiatric/Behavioral: Positive for sleep disturbance. Negative for agitation, hallucinations, self-injury and suicidal ideas. The patient is nervous/anxious.        Past Medical History:   Diagnosis Date    Anxiety     Depression     Diabetes mellitus     Hypertension     Hypothyroid     Sleep disorder     Stroke        Social History:  Social History     Social History    Marital status:      Spouse name: N/A    Number of children: N/A    Years of education: N/A     Social History Main Topics    Smoking status: Former Smoker     Packs/day: 1.00     Types: Vaping w/o nicotine     "Smokeless tobacco: Never Used      Comment: vape     Alcohol use No      Comment: couple times a year    Drug use: No    Sexual activity: Yes     Partners: Female     Birth control/ protection: None     Other Topics Concern    None     Social History Narrative    None       Family History:   family history includes Cancer in his father and mother; Cataracts in his father and mother; Diabetes in his maternal grandmother; Macular degeneration in his father.    Health Maintenance   Topic Date Due    Foot Exam  04/28/1978    Pneumococcal PPSV23 (Medium Risk) (1) 04/28/1986    Influenza Vaccine  08/01/2017    Hemoglobin A1c  03/07/2018    Eye Exam  08/15/2018    Lipid Panel  09/07/2018    TETANUS VACCINE  05/14/2025       Physical Exam:    Vital Signs  Temp: 98.1 °F (36.7 °C)  Temp src: Tympanic  Pulse: 79  SpO2: 99 %  BP: 126/86  BP Location: Left arm  Patient Position: Sitting  Pain Score:   3  Pain Loc: Shoulder  Height and Weight  Height: 5' 10.5" (179.1 cm)  Weight: 90.4 kg (199 lb 3 oz)  BSA (Calculated - sq m): 2.12 sq meters  BMI (Calculated): 28.2  Weight in (lb) to have BMI = 25: 176.4]    Body mass index is 28.18 kg/m².    Physical Exam   Constitutional: He is oriented to person, place, and time. He appears well-developed.   HENT:   Mouth/Throat: Oropharynx is clear and moist.   Eyes: Conjunctivae are normal. Pupils are equal, round, and reactive to light.   Neck: Normal range of motion. Neck supple.   Cardiovascular: Normal rate, regular rhythm and normal heart sounds.    No murmur heard.  Pulmonary/Chest: Effort normal and breath sounds normal. No respiratory distress. He has no wheezes. He has no rales. He exhibits no tenderness.   Abdominal: Soft. He exhibits no distension and no mass. There is no tenderness. There is no guarding.   Musculoskeletal: He exhibits no edema or tenderness.   Lymphadenopathy:     He has no cervical adenopathy.   Neurological: He is alert and oriented to person, " place, and time. He has normal reflexes.   Skin: Skin is warm and dry.   Psychiatric: He has a normal mood and affect. His behavior is normal. Judgment and thought content normal.         Diabetes Management Status    Statin: Taking  ACE/ARB: Taking    Screening or Prevention Patient's value Goal Complete/Controlled?   HgA1C Testing and Control   Lab Results   Component Value Date    HGBA1C 5.2 09/07/2017      Annually/Less than 8% Yes   Lipid profile : 09/07/2017 Annually Yes   LDL control Lab Results   Component Value Date    LDLCALC 78.8 09/07/2017    Annually/Less than 100 mg/dl  Yes   Nephropathy screening No results found for: LABMICR  Lab Results   Component Value Date    PROTEINUA NEG 10/24/2006    Annually No   Blood pressure BP Readings from Last 1 Encounters:   04/25/18 126/86    Less than 140/90 Yes   Dilated retinal exam : 08/15/2017 Annually Yes   Foot exam   Most Recent Foot Exam Date: Not Found Annually No       Assessment:      ICD-10-CM ICD-9-CM   1. Essential hypertension I10 401.9   2. Acute stress reaction F43.0 308.9   3. Circadian rhythm sleep disorder, shift work type G47.26 327.36   4. Hypothyroidism due to acquired atrophy of thyroid E03.4 244.8     246.8   5. Controlled type 2 diabetes mellitus without complication, without long-term current use of insulin E11.9 250.00         Plan:  Check labs today  Start patient on Paxil 20 mg once a day  Discuss other techniques of stress reduction  Please take blood pressure medicine at night  Follow-up in 6 weeks  Orders Placed This Encounter   Procedures    Hemoglobin A1c    Comprehensive metabolic panel    Lipid panel    TSH       Current Outpatient Prescriptions   Medication Sig Dispense Refill    armodafinil (NUVIGIL) 250 mg tablet TAKE 1 TABLET ONCE DAILY 90 tablet 1    atorvastatin (LIPITOR) 20 MG tablet Take 1 tablet (20 mg total) by mouth once daily. 90 tablet 3    buPROPion (WELLBUTRIN SR) 150 MG TBSR 12 hr tablet Take 1 tablet (150  mg total) by mouth 2 (two) times daily. (Patient taking differently: Take 150 mg by mouth once daily. ) 180 tablet 3    eszopiclone (LUNESTA) 2 MG Tab Take 1 tablet (2 mg total) by mouth every evening. 90 tablet 1    levothyroxine (SYNTHROID) 200 MCG tablet Take 1 tablet (200 mcg total) by mouth once daily. 30 tablet 6    lisinopril-hydrochlorothiazide (PRINZIDE,ZESTORETIC) 20-12.5 mg per tablet Take 1 tablet by mouth once daily. 90 tablet 3    magnesium gluconate 27.5 mg (500 mg) Tab Take 500 mg by mouth once daily.      MELATONIN ORAL Take by mouth. Patient states that he takes 5 mg daily      metformin (GLUCOPHAGE) 500 MG tablet Take 1 tablet (500 mg total) by mouth 2 (two) times daily with meals. (Patient taking differently: Take 500 mg by mouth daily with breakfast. Patients that he takes this medication once daily) 180 tablet 3    metoprolol succinate (TOPROL-XL) 50 MG 24 hr tablet Take 1 tablet (50 mg total) by mouth once daily. 90 tablet 3    POTASSIUM ORAL Take by mouth. Patient states that he takes this medication daily      diclofenac sodium 1 % Gel Apply 2 g topically 4 (four) times daily. 1 Tube 2    paroxetine (PAXIL) 20 MG tablet Take 1 tablet (20 mg total) by mouth every morning. 30 tablet 11    UNABLE TO FIND medication name: Co Q 10 patient states that he takes this medication daily       No current facility-administered medications for this visit.        Medications Discontinued During This Encounter   Medication Reason    eszopiclone (LUNESTA) 2 MG Tab Reorder       Follow-up in about 6 weeks (around 6/6/2018).      Delores Lazar MD

## 2018-04-27 ENCOUNTER — TELEPHONE (OUTPATIENT)
Dept: FAMILY MEDICINE | Facility: CLINIC | Age: 48
End: 2018-04-27

## 2018-04-27 RX ORDER — METRONIDAZOLE 500 MG/1
2000 TABLET ORAL ONCE
Qty: 4 TABLET | Refills: 0 | Status: SHIPPED | OUTPATIENT
Start: 2018-04-27 | End: 2018-04-27

## 2018-04-27 NOTE — TELEPHONE ENCOUNTER
Take all 4 pills at once.  This will cause some nausea.  Do not have sex until both of them are treated.

## 2018-05-24 RX ORDER — METOPROLOL SUCCINATE 50 MG/1
TABLET, EXTENDED RELEASE ORAL
Qty: 90 TABLET | Refills: 3 | Status: SHIPPED | OUTPATIENT
Start: 2018-05-24 | End: 2019-01-29 | Stop reason: SDUPTHER

## 2018-06-21 RX ORDER — LISINOPRIL AND HYDROCHLOROTHIAZIDE 12.5; 2 MG/1; MG/1
TABLET ORAL
Qty: 90 TABLET | Refills: 3 | Status: SHIPPED | OUTPATIENT
Start: 2018-06-21 | End: 2019-07-30 | Stop reason: SDUPTHER

## 2018-08-02 RX ORDER — ATORVASTATIN CALCIUM 20 MG/1
TABLET, FILM COATED ORAL
Qty: 90 TABLET | Refills: 3 | Status: SHIPPED | OUTPATIENT
Start: 2018-08-02 | End: 2019-06-26 | Stop reason: SDUPTHER

## 2018-08-12 RX ORDER — LEVOTHYROXINE SODIUM 200 UG/1
TABLET ORAL
Qty: 30 TABLET | Refills: 6 | Status: SHIPPED | OUTPATIENT
Start: 2018-08-12 | End: 2019-01-29 | Stop reason: SDUPTHER

## 2018-08-12 RX ORDER — METFORMIN HYDROCHLORIDE 500 MG/1
TABLET ORAL
Qty: 180 TABLET | Refills: 3 | Status: SHIPPED | OUTPATIENT
Start: 2018-08-12 | End: 2019-01-31 | Stop reason: SDUPTHER

## 2018-08-17 RX ORDER — METFORMIN HYDROCHLORIDE 500 MG/1
TABLET ORAL
Qty: 180 TABLET | Refills: 3 | Status: SHIPPED | OUTPATIENT
Start: 2018-08-17 | End: 2019-07-30 | Stop reason: SDUPTHER

## 2018-08-17 RX ORDER — LEVOTHYROXINE SODIUM 200 UG/1
TABLET ORAL
Qty: 30 TABLET | Refills: 6 | Status: SHIPPED | OUTPATIENT
Start: 2018-08-17 | End: 2019-01-31 | Stop reason: SDUPTHER

## 2018-10-14 RX ORDER — ARMODAFINIL 250 MG/1
TABLET ORAL
Qty: 90 TABLET | Refills: 1 | OUTPATIENT
Start: 2018-10-14

## 2018-10-14 RX ORDER — ESZOPICLONE 2 MG/1
TABLET, FILM COATED ORAL
Qty: 90 TABLET | Refills: 1 | OUTPATIENT
Start: 2018-10-14

## 2018-10-23 DIAGNOSIS — E07.9 THYROID DISEASE: ICD-10-CM

## 2018-10-23 RX ORDER — ESZOPICLONE 2 MG/1
2 TABLET, FILM COATED ORAL NIGHTLY
Qty: 90 TABLET | Refills: 1 | Status: SHIPPED | OUTPATIENT
Start: 2018-10-23 | End: 2019-01-29 | Stop reason: SDUPTHER

## 2018-10-23 RX ORDER — BUPROPION HYDROCHLORIDE 150 MG/1
150 TABLET, EXTENDED RELEASE ORAL 2 TIMES DAILY
Qty: 180 TABLET | Refills: 3 | Status: SHIPPED | OUTPATIENT
Start: 2018-10-23 | End: 2019-07-30

## 2019-01-18 ENCOUNTER — PATIENT MESSAGE (OUTPATIENT)
Dept: FAMILY MEDICINE | Facility: CLINIC | Age: 49
End: 2019-01-18

## 2019-01-21 RX ORDER — ESZOPICLONE 2 MG/1
2 TABLET, FILM COATED ORAL NIGHTLY
Qty: 90 TABLET | Refills: 1 | OUTPATIENT
Start: 2019-01-21

## 2019-01-21 RX ORDER — ESZOPICLONE 2 MG/1
2 TABLET, FILM COATED ORAL NIGHTLY
Qty: 90 TABLET | Refills: 1 | Status: CANCELLED | OUTPATIENT
Start: 2019-01-21

## 2019-01-22 ENCOUNTER — PATIENT MESSAGE (OUTPATIENT)
Dept: FAMILY MEDICINE | Facility: CLINIC | Age: 49
End: 2019-01-22

## 2019-01-29 ENCOUNTER — LAB VISIT (OUTPATIENT)
Dept: LAB | Facility: HOSPITAL | Age: 49
End: 2019-01-29
Attending: FAMILY MEDICINE
Payer: COMMERCIAL

## 2019-01-29 ENCOUNTER — OFFICE VISIT (OUTPATIENT)
Dept: FAMILY MEDICINE | Facility: CLINIC | Age: 49
End: 2019-01-29
Payer: COMMERCIAL

## 2019-01-29 VITALS
TEMPERATURE: 99 F | BODY MASS INDEX: 31.22 KG/M2 | WEIGHT: 218.06 LBS | DIASTOLIC BLOOD PRESSURE: 80 MMHG | HEIGHT: 70 IN | SYSTOLIC BLOOD PRESSURE: 128 MMHG | HEART RATE: 97 BPM | OXYGEN SATURATION: 97 %

## 2019-01-29 DIAGNOSIS — I10 ESSENTIAL HYPERTENSION: ICD-10-CM

## 2019-01-29 DIAGNOSIS — Z12.11 COLON CANCER SCREENING: ICD-10-CM

## 2019-01-29 DIAGNOSIS — F32.A DEPRESSION, UNSPECIFIED DEPRESSION TYPE: ICD-10-CM

## 2019-01-29 DIAGNOSIS — Z01.00 DIABETIC EYE EXAM: ICD-10-CM

## 2019-01-29 DIAGNOSIS — E78.49 OTHER HYPERLIPIDEMIA: ICD-10-CM

## 2019-01-29 DIAGNOSIS — G25.0 ESSENTIAL TREMOR: Primary | ICD-10-CM

## 2019-01-29 DIAGNOSIS — Z12.5 PROSTATE CANCER SCREENING: ICD-10-CM

## 2019-01-29 DIAGNOSIS — E11.9 CONTROLLED TYPE 2 DIABETES MELLITUS WITHOUT COMPLICATION, WITHOUT LONG-TERM CURRENT USE OF INSULIN: ICD-10-CM

## 2019-01-29 DIAGNOSIS — E11.9 DIABETIC EYE EXAM: ICD-10-CM

## 2019-01-29 DIAGNOSIS — E03.4 HYPOTHYROIDISM DUE TO ACQUIRED ATROPHY OF THYROID: ICD-10-CM

## 2019-01-29 LAB
ALBUMIN SERPL BCP-MCNC: 4.3 G/DL
ALP SERPL-CCNC: 54 U/L
ALT SERPL W/O P-5'-P-CCNC: 42 U/L
ANION GAP SERPL CALC-SCNC: 11 MMOL/L
AST SERPL-CCNC: 27 U/L
BASOPHILS # BLD AUTO: 0.09 K/UL
BASOPHILS NFR BLD: 0.9 %
BILIRUB SERPL-MCNC: 0.8 MG/DL
BUN SERPL-MCNC: 12 MG/DL
CALCIUM SERPL-MCNC: 9.8 MG/DL
CHLORIDE SERPL-SCNC: 98 MMOL/L
CHOLEST SERPL-MCNC: 159 MG/DL
CHOLEST/HDLC SERPL: 4.3 {RATIO}
CO2 SERPL-SCNC: 24 MMOL/L
COMPLEXED PSA SERPL-MCNC: 0.22 NG/ML
CREAT SERPL-MCNC: 0.9 MG/DL
DIFFERENTIAL METHOD: ABNORMAL
EOSINOPHIL # BLD AUTO: 0.3 K/UL
EOSINOPHIL NFR BLD: 2.7 %
ERYTHROCYTE [DISTWIDTH] IN BLOOD BY AUTOMATED COUNT: 12.5 %
EST. GFR  (AFRICAN AMERICAN): >60 ML/MIN/1.73 M^2
EST. GFR  (NON AFRICAN AMERICAN): >60 ML/MIN/1.73 M^2
ESTIMATED AVG GLUCOSE: 123 MG/DL
GLUCOSE SERPL-MCNC: 88 MG/DL
HBA1C MFR BLD HPLC: 5.9 %
HCT VFR BLD AUTO: 43.4 %
HDLC SERPL-MCNC: 37 MG/DL
HDLC SERPL: 23.3 %
HGB BLD-MCNC: 14.8 G/DL
IMM GRANULOCYTES # BLD AUTO: 0.03 K/UL
IMM GRANULOCYTES NFR BLD AUTO: 0.3 %
LDLC SERPL CALC-MCNC: 79.6 MG/DL
LYMPHOCYTES # BLD AUTO: 3.2 K/UL
LYMPHOCYTES NFR BLD: 33.1 %
MCH RBC QN AUTO: 29 PG
MCHC RBC AUTO-ENTMCNC: 34.1 G/DL
MCV RBC AUTO: 85 FL
MONOCYTES # BLD AUTO: 0.8 K/UL
MONOCYTES NFR BLD: 8.8 %
NEUTROPHILS # BLD AUTO: 5.2 K/UL
NEUTROPHILS NFR BLD: 54.2 %
NONHDLC SERPL-MCNC: 122 MG/DL
NRBC BLD-RTO: 0 /100 WBC
PLATELET # BLD AUTO: 278 K/UL
PMV BLD AUTO: 8.9 FL
POTASSIUM SERPL-SCNC: 4 MMOL/L
PROT SERPL-MCNC: 7.4 G/DL
RBC # BLD AUTO: 5.1 M/UL
SODIUM SERPL-SCNC: 133 MMOL/L
TRIGL SERPL-MCNC: 212 MG/DL
WBC # BLD AUTO: 9.55 K/UL

## 2019-01-29 PROCEDURE — 84153 ASSAY OF PSA TOTAL: CPT

## 2019-01-29 PROCEDURE — 99999 PR PBB SHADOW E&M-EST. PATIENT-LVL IV: ICD-10-PCS | Mod: PBBFAC,,, | Performed by: FAMILY MEDICINE

## 2019-01-29 PROCEDURE — 36415 COLL VENOUS BLD VENIPUNCTURE: CPT | Mod: PO

## 2019-01-29 PROCEDURE — 83036 HEMOGLOBIN GLYCOSYLATED A1C: CPT

## 2019-01-29 PROCEDURE — 80053 COMPREHEN METABOLIC PANEL: CPT

## 2019-01-29 PROCEDURE — 85025 COMPLETE CBC W/AUTO DIFF WBC: CPT

## 2019-01-29 PROCEDURE — 99214 OFFICE O/P EST MOD 30 MIN: CPT | Mod: S$GLB,,, | Performed by: FAMILY MEDICINE

## 2019-01-29 PROCEDURE — 99999 PR PBB SHADOW E&M-EST. PATIENT-LVL IV: CPT | Mod: PBBFAC,,, | Performed by: FAMILY MEDICINE

## 2019-01-29 PROCEDURE — 80061 LIPID PANEL: CPT

## 2019-01-29 PROCEDURE — 99214 PR OFFICE/OUTPT VISIT, EST, LEVL IV, 30-39 MIN: ICD-10-PCS | Mod: S$GLB,,, | Performed by: FAMILY MEDICINE

## 2019-01-29 RX ORDER — ARMODAFINIL 250 MG/1
250 TABLET ORAL DAILY
Qty: 90 TABLET | Refills: 2 | Status: SHIPPED | OUTPATIENT
Start: 2019-01-29 | End: 2019-12-03 | Stop reason: SDUPTHER

## 2019-01-29 RX ORDER — METOPROLOL SUCCINATE 50 MG/1
50 TABLET, EXTENDED RELEASE ORAL DAILY
Qty: 90 TABLET | Refills: 2 | Status: ON HOLD | OUTPATIENT
Start: 2019-01-29 | End: 2019-03-07 | Stop reason: HOSPADM

## 2019-01-29 RX ORDER — ESZOPICLONE 2 MG/1
2 TABLET, FILM COATED ORAL NIGHTLY
Qty: 90 TABLET | Refills: 1 | Status: SHIPPED | OUTPATIENT
Start: 2019-01-29 | End: 2019-07-09 | Stop reason: SDUPTHER

## 2019-01-29 RX ORDER — PROPRANOLOL HYDROCHLORIDE 80 MG/1
80 CAPSULE, EXTENDED RELEASE ORAL DAILY
Qty: 30 CAPSULE | Refills: 11 | Status: SHIPPED | OUTPATIENT
Start: 2019-01-29 | End: 2019-01-31 | Stop reason: SDUPTHER

## 2019-01-29 RX ORDER — LEVOTHYROXINE SODIUM 200 UG/1
200 TABLET ORAL DAILY
Qty: 90 TABLET | Refills: 2 | Status: SHIPPED | OUTPATIENT
Start: 2019-01-29 | End: 2019-10-16 | Stop reason: SDUPTHER

## 2019-01-29 NOTE — PROGRESS NOTES
Chief Complaint:    Chief Complaint   Patient presents with    Medication Refill       History of Present Illness:    Presents today for six-month follow-up:  Doing okay he has diabetes type 2 he has managing it well with diet he has gained some weight because he was on a low carb diet  Blood pressure in he says at home is okay  Takes Lunesta for insomnia  He has shift work disorder where well controlled with current medication  Also complains of her tremor in his hand gets worse when he tries to do something.    ROS:  Review of Systems   Constitutional: Negative for activity change, chills, fatigue, fever and unexpected weight change.   HENT: Negative for congestion, ear discharge, ear pain, hearing loss, postnasal drip and rhinorrhea.    Eyes: Negative for pain and visual disturbance.   Respiratory: Negative for cough, chest tightness and shortness of breath.    Cardiovascular: Negative for chest pain and palpitations.   Gastrointestinal: Negative for abdominal pain, diarrhea and vomiting.   Endocrine: Negative for heat intolerance.   Genitourinary: Negative for dysuria, flank pain, frequency and hematuria.   Musculoskeletal: Negative for back pain, gait problem and neck pain.   Skin: Negative for color change and rash.   Neurological: Negative for dizziness, tremors, seizures, numbness and headaches.   Psychiatric/Behavioral: Negative for agitation, hallucinations, self-injury, sleep disturbance and suicidal ideas. The patient is not nervous/anxious.        Past Medical History:   Diagnosis Date    Anxiety     Depression     Diabetes mellitus     Hypertension     Hypothyroid     Sleep disorder     Stroke        Social History:  Social History     Socioeconomic History    Marital status:      Spouse name: None    Number of children: None    Years of education: None    Highest education level: None   Social Needs    Financial resource strain: None    Food insecurity - worry: None    Food  "insecurity - inability: None    Transportation needs - medical: None    Transportation needs - non-medical: None   Occupational History    None   Tobacco Use    Smoking status: Former Smoker     Packs/day: 1.00     Types: Vaping w/o nicotine    Smokeless tobacco: Never Used    Tobacco comment: vape    Substance and Sexual Activity    Alcohol use: No     Comment: couple times a year    Drug use: No    Sexual activity: Yes     Partners: Female     Birth control/protection: None   Other Topics Concern    None   Social History Narrative    None       Family History:   family history includes Cancer in his father and mother; Cataracts in his father and mother; Diabetes in his maternal grandmother; Macular degeneration in his father.    Health Maintenance   Topic Date Due    Pneumococcal Vaccine (Medium Risk) (1 of 1 - PPSV23) 04/28/1987    Colonoscopy  04/28/2018    Influenza Vaccine  08/01/2018    Eye Exam  08/15/2018    Hemoglobin A1c  10/25/2018    Lipid Panel  04/25/2019    Foot Exam  01/29/2020    TETANUS VACCINE  05/14/2025       Physical Exam:    Vital Signs  Temp: 99.4 °F (37.4 °C)  Temp src: Tympanic  Pulse: 97  SpO2: 97 %  BP: (!) 128/90  BP Location: Left arm  Patient Position: Sitting  Pain Score: 0-No pain  Height and Weight  Height: 5' 10" (177.8 cm)  Weight: 98.9 kg (218 lb 0.6 oz)  BSA (Calculated - sq m): 2.21 sq meters  BMI (Calculated): 31.4  Weight in (lb) to have BMI = 25: 173.9]    Body mass index is 31.28 kg/m².    Physical Exam   Constitutional: He is oriented to person, place, and time. He appears well-developed.   HENT:   Mouth/Throat: Oropharynx is clear and moist.   Eyes: Conjunctivae are normal. Pupils are equal, round, and reactive to light.   Neck: Normal range of motion. Neck supple.   Cardiovascular: Normal rate, regular rhythm and normal heart sounds.   No murmur heard.  Pulses:       Dorsalis pedis pulses are 1+ on the right side, and 0 on the left side.        " Posterior tibial pulses are 1+ on the right side, and 0 on the left side.   Pulmonary/Chest: Effort normal and breath sounds normal. No respiratory distress. He has no wheezes. He has no rales. He exhibits no tenderness.   Abdominal: Soft. He exhibits no distension and no mass. There is no tenderness. There is no guarding.   Musculoskeletal: He exhibits no edema or tenderness.   Feet:   Right Foot:   Protective Sensation: 10 sites tested. 6 sites sensed.   Left Foot:   Protective Sensation: 10 sites tested. 6 sites sensed.   Lymphadenopathy:     He has no cervical adenopathy.   Neurological: He is alert and oriented to person, place, and time. He has normal reflexes.   Skin: Skin is warm and dry.   Psychiatric: He has a normal mood and affect. His behavior is normal. Judgment and thought content normal.         Diabetes Management Status    Statin: Taking  ACE/ARB: Taking    Screening or Prevention Patient's value Goal Complete/Controlled?   HgA1C Testing and Control   Lab Results   Component Value Date    HGBA1C 5.4 04/25/2018      Annually/Less than 8% Yes   Lipid profile : 04/25/2018 Annually Yes   LDL control Lab Results   Component Value Date    LDLCALC 94.6 04/25/2018    Annually/Less than 100 mg/dl  Yes   Nephropathy screening No results found for: LABMICR  Lab Results   Component Value Date    PROTEINUA NEG 10/24/2006    Annually No   Blood pressure BP Readings from Last 1 Encounters:   01/29/19 (!) 128/90    Less than 140/90 Yes   Dilated retinal exam : 08/15/2017 Annually No   Foot exam   : 01/29/2019 Annually Yes       Assessment:      ICD-10-CM ICD-9-CM   1. Essential tremor G25.0 333.1   2. Essential hypertension I10 401.9   3. Hypothyroidism due to acquired atrophy of thyroid E03.4 244.8     246.8   4. Depression, unspecified depression type F32.9 311   5. Colon cancer screening Z12.11 V76.51   6. Prostate cancer screening Z12.5 V76.44   7. Other hyperlipidemia E78.49 272.4   8. Controlled type 2  diabetes mellitus without complication, without long-term current use of insulin E11.9 250.00   9. Diabetic eye exam Z01.00 V72.0    E11.9 250.00         Plan:  Appears to have essential tremors recommend trial of Inderal  Explained to him that Wellbutrin can also cause tremor.  Check labs as below  Schedule for a eye exam  Recommend doing a screening colonoscopy  Follow-up 6 months  Orders Placed This Encounter   Procedures    CBC auto differential    Comprehensive metabolic panel    Lipid panel    PSA, Screening    Hemoglobin A1c    Ambulatory referral to Optometry       Current Outpatient Medications   Medication Sig Dispense Refill    atorvastatin (LIPITOR) 20 MG tablet TAKE 1 TABLET ONCE DAILY 90 tablet 3    buPROPion (WELLBUTRIN SR) 150 MG TBSR 12 hr tablet Take 1 tablet (150 mg total) by mouth 2 (two) times daily. 180 tablet 3    levothyroxine (SYNTHROID) 200 MCG tablet TAKE ONE TABLET BY MOUTH ONCE DAILY 30 tablet 6    lisinopril-hydrochlorothiazide (PRINZIDE,ZESTORETIC) 20-12.5 mg per tablet TAKE 1 TABLET ONCE DAILY 90 tablet 3    magnesium gluconate 27.5 mg (500 mg) Tab Take 500 mg by mouth once daily.      MELATONIN ORAL Take by mouth. Patient states that he takes 5 mg daily      metFORMIN (GLUCOPHAGE) 500 MG tablet TAKE ONE TABLET BY MOUTH TWICE DAILY WITH MEALS 180 tablet 3    metFORMIN (GLUCOPHAGE) 500 MG tablet TAKE ONE TABLET BY MOUTH TWICE DAILY WITH MEALS 180 tablet 3    paroxetine (PAXIL) 20 MG tablet Take 1 tablet (20 mg total) by mouth every morning. 30 tablet 11    POTASSIUM ORAL Take by mouth. Patient states that he takes this medication daily      UNABLE TO FIND medication name: Co Q 10 patient states that he takes this medication daily      armodafinil (NUVIGIL) 250 mg tablet Take 1 tablet (250 mg total) by mouth once daily. 90 tablet 2    diclofenac sodium 1 % Gel Apply 2 g topically 4 (four) times daily. 1 Tube 2    eszopiclone (LUNESTA) 2 MG Tab Take 1 tablet (2 mg  total) by mouth every evening. 90 tablet 1    levothyroxine (SYNTHROID) 200 MCG tablet Take 1 tablet (200 mcg total) by mouth once daily. 90 tablet 2    metoprolol succinate (TOPROL-XL) 50 MG 24 hr tablet Take 1 tablet (50 mg total) by mouth once daily. 90 tablet 2    propranolol (INDERAL LA) 80 MG 24 hr capsule Take 1 capsule (80 mg total) by mouth once daily. 30 capsule 11     No current facility-administered medications for this visit.        Medications Discontinued During This Encounter   Medication Reason    eszopiclone (LUNESTA) 2 MG Tab Reorder    armodafinil (NUVIGIL) 250 mg tablet Reorder    levothyroxine (SYNTHROID) 200 MCG tablet Reorder    metoprolol succinate (TOPROL-XL) 50 MG 24 hr tablet Reorder       No Follow-up on file.      Delores Lazar MD

## 2019-01-31 ENCOUNTER — TELEPHONE (OUTPATIENT)
Dept: FAMILY MEDICINE | Facility: CLINIC | Age: 49
End: 2019-01-31

## 2019-01-31 RX ORDER — PROPRANOLOL HYDROCHLORIDE 80 MG/1
80 CAPSULE, EXTENDED RELEASE ORAL DAILY
Qty: 90 CAPSULE | Refills: 3 | Status: SHIPPED | OUTPATIENT
Start: 2019-01-31 | End: 2020-02-14

## 2019-01-31 NOTE — TELEPHONE ENCOUNTER
----- Message from Steve Trevino sent at 1/31/2019 11:59 AM CST -----  Contact: shakeel (care david)    Realized after she hung up that the plan calls for 90days and wants to make sure that would be fine for the  inderal 80 mg tablets.      [phone]   Ref # [id number]

## 2019-01-31 NOTE — TELEPHONE ENCOUNTER
[facility] pharmacy calling for a 90 day RX of the Inderal , is that okay?  I know this was a trail for his tremor

## 2019-01-31 NOTE — TELEPHONE ENCOUNTER
Spoke with Yanni, pharmacist at [facility] and confirmed pt is to be taking metoprolol 50 mg and propranolol 80 mg. Yanni verbalized understanding.

## 2019-01-31 NOTE — TELEPHONE ENCOUNTER
----- Message from Chel Stevenson sent at 1/31/2019 10:48 AM CST -----  Cony ( [facility] ) is requesting a call from nurse to discuss a duplicate therapy issue.          Please call Cony ( [facility] ) [phone] ref [phone]

## 2019-02-04 ENCOUNTER — PATIENT MESSAGE (OUTPATIENT)
Dept: FAMILY MEDICINE | Facility: CLINIC | Age: 49
End: 2019-02-04

## 2019-02-04 ENCOUNTER — DOCUMENTATION ONLY (OUTPATIENT)
Dept: ENDOSCOPY | Facility: HOSPITAL | Age: 49
End: 2019-02-04

## 2019-02-04 NOTE — PROGRESS NOTES
Endoscopy Scheduling Questionnaire:    Call Type:Patient returned Televox call    1. Have you been admitted overnight to the hospital in the past 3 months? no  2. Do you get CP and SOB while walking up a flight of stairs? no  3. Have you had a stent placed in the past 12 months? no  4. Have you had a stroke or heart attack in the past 6 months? no  5. Have you had any chest pain in the past 3 months? no      If so, have you been evaluated by your PCP or Cardiologist? no  6. Do you take weight loss medications? no  7. Have you been diagnosed with Diverticulitis within the past 3 months? no  8. Are you having any GI symptoms that you feel need to be evaluated prior to your procedure? no  9. Are you on dialysis? no  10. Are you diabetic? yes  11. Do you have any other health issues that you feel might limit your ability to safely have the procedure and/or sedation? no  12. Is the patient over 79 yo? no        If so, has the patient been seen by their PCP or GI in the last 3 months? N/A       -I have reviewed the last colonoscopy for recommendations regarding surveillance and bowel prep  is NA  -I have reviewed the patient's medications and allergies. He is not on high risk medications and will require cardiac clearance. A clearance request NA  -I have verified the pharmacy information. The prep being used is Suprep. The patient's prep instructions were sent via [facility] patient portal..    Date Endoscopy Scheduled: Date: 3/7/19  Or  Date Gastro office visit Scheduled: NA

## 2019-02-05 RX ORDER — SODIUM, POTASSIUM,MAG SULFATES 17.5-3.13G
SOLUTION, RECONSTITUTED, ORAL ORAL
Qty: 354 ML | Refills: 0 | Status: ON HOLD | OUTPATIENT
Start: 2019-02-05 | End: 2019-03-07 | Stop reason: HOSPADM

## 2019-03-01 ENCOUNTER — OFFICE VISIT (OUTPATIENT)
Dept: OPHTHALMOLOGY | Facility: CLINIC | Age: 49
End: 2019-03-01
Payer: COMMERCIAL

## 2019-03-01 DIAGNOSIS — H52.13 MYOPIA, BILATERAL: ICD-10-CM

## 2019-03-01 DIAGNOSIS — I10 ESSENTIAL HYPERTENSION: ICD-10-CM

## 2019-03-01 DIAGNOSIS — E11.9 DIABETES MELLITUS TYPE 2 WITHOUT RETINOPATHY: Primary | ICD-10-CM

## 2019-03-01 DIAGNOSIS — H52.4 BILATERAL PRESBYOPIA: ICD-10-CM

## 2019-03-01 PROCEDURE — 92014 COMPRE OPH EXAM EST PT 1/>: CPT | Mod: S$GLB,,, | Performed by: OPTOMETRIST

## 2019-03-01 PROCEDURE — 99999 PR PBB SHADOW E&M-EST. PATIENT-LVL I: CPT | Mod: PBBFAC,,, | Performed by: OPTOMETRIST

## 2019-03-01 PROCEDURE — 99999 PR PBB SHADOW E&M-EST. PATIENT-LVL I: ICD-10-PCS | Mod: PBBFAC,,, | Performed by: OPTOMETRIST

## 2019-03-01 PROCEDURE — 92015 PR REFRACTION: ICD-10-PCS | Mod: S$GLB,,, | Performed by: OPTOMETRIST

## 2019-03-01 PROCEDURE — 92014 PR EYE EXAM, EST PATIENT,COMPREHESV: ICD-10-PCS | Mod: S$GLB,,, | Performed by: OPTOMETRIST

## 2019-03-01 PROCEDURE — 92015 DETERMINE REFRACTIVE STATE: CPT | Mod: S$GLB,,, | Performed by: OPTOMETRIST

## 2019-03-01 NOTE — LETTER
March 1, 2019      Delores Lazar MD  [address] [address] [address]           [facility]  [address] [place] [id number]  Phone: [phone]  Fax: [phone]          Patient: Wilman Neri   MR Number: [id number]   YOB: 1970   Date of Visit: 3/1/2019       Dear Dr. Delores Lazar:    Thank you for referring Wilman Neri to me for evaluation. Attached you will find relevant portions of my assessment and plan of care.    If you have questions, please do not hesitate to call me. I look forward to following Wilman Neri along with you.    Sincerely,    Raymond Carney, OD    Enclosure  CC:  No Recipients    If you would like to receive this communication electronically, please contact [email or web address] or [phone] to request more information on [employer] access.    For providers and/or their staff who would like to refer a patient to [facility], please contact us through our one-stop-shop provider referral line, Kirt Beth, at [phone].    If you feel you have received this communication in error or would no longer like to receive these types of communications, please e-mail [email or web address]

## 2019-03-01 NOTE — PROGRESS NOTES
HPI     Pt here for DM exam. Last TRF exam was 8/15/17. Pt's latest blood sugar   normally falls between 140 and 90. Pt wears PAL glasses and would like a   new Rx. No vision fluctuations since last appt.     Last edited by Anish Hoover, Patient Care Assistant on 3/1/2019  3:02   PM. (History)            Assessment /Plan     For exam results, see Encounter Report.    Diabetes mellitus type 2 without retinopathy    Essential hypertension    Myopia, bilateral    Bilateral presbyopia      No Background Diabetic Retinopathy    No HTN Retinopathy    Dispense Final Rx for glasses.  RTC 1 year  Discussed above and answered questions.

## 2019-03-05 PROBLEM — Z12.11 COLON CANCER SCREENING: Status: ACTIVE | Noted: 2019-03-05

## 2019-03-07 ENCOUNTER — ANESTHESIA EVENT (OUTPATIENT)
Dept: ENDOSCOPY | Facility: HOSPITAL | Age: 49
End: 2019-03-07
Payer: COMMERCIAL

## 2019-03-07 ENCOUNTER — ANESTHESIA (OUTPATIENT)
Dept: ENDOSCOPY | Facility: HOSPITAL | Age: 49
End: 2019-03-07
Payer: COMMERCIAL

## 2019-03-07 ENCOUNTER — HOSPITAL ENCOUNTER (OUTPATIENT)
Facility: HOSPITAL | Age: 49
Discharge: HOME | End: 2019-03-07
Attending: INTERNAL MEDICINE | Admitting: INTERNAL MEDICINE
Payer: COMMERCIAL

## 2019-03-07 DIAGNOSIS — Z12.11 COLON CANCER SCREENING: Primary | ICD-10-CM

## 2019-03-07 LAB — POCT GLUCOSE: 102 MG/DL (ref 70–110)

## 2019-03-07 PROCEDURE — 25000003 PHARM REV CODE 250: Performed by: INTERNAL MEDICINE

## 2019-03-07 PROCEDURE — G0121 COLON CA SCRN NOT HI RSK IND: HCPCS | Performed by: INTERNAL MEDICINE

## 2019-03-07 PROCEDURE — G0121 COLON CA SCRN NOT HI RSK IND: ICD-10-PCS | Mod: ,,, | Performed by: INTERNAL MEDICINE

## 2019-03-07 PROCEDURE — G0121 COLON CA SCRN NOT HI RSK IND: HCPCS | Mod: ,,, | Performed by: INTERNAL MEDICINE

## 2019-03-07 PROCEDURE — 37000009 HC ANESTHESIA EA ADD 15 MINS: Performed by: INTERNAL MEDICINE

## 2019-03-07 PROCEDURE — 63600175 PHARM REV CODE 636 W HCPCS: Performed by: NURSE ANESTHETIST, CERTIFIED REGISTERED

## 2019-03-07 PROCEDURE — 37000008 HC ANESTHESIA 1ST 15 MINUTES: Performed by: INTERNAL MEDICINE

## 2019-03-07 RX ORDER — PROPOFOL 10 MG/ML
INJECTION, EMULSION INTRAVENOUS
Status: DISCONTINUED | OUTPATIENT
Start: 2019-03-07 | End: 2019-03-07

## 2019-03-07 RX ORDER — SODIUM CHLORIDE, SODIUM LACTATE, POTASSIUM CHLORIDE, CALCIUM CHLORIDE 600; 310; 30; 20 MG/100ML; MG/100ML; MG/100ML; MG/100ML
INJECTION, SOLUTION INTRAVENOUS CONTINUOUS
Status: DISCONTINUED | OUTPATIENT
Start: 2019-03-07 | End: 2019-03-07 | Stop reason: HOSPADM

## 2019-03-07 RX ORDER — LIDOCAINE HCL/PF 100 MG/5ML
SYRINGE (ML) INTRAVENOUS
Status: DISCONTINUED | OUTPATIENT
Start: 2019-03-07 | End: 2019-03-07

## 2019-03-07 RX ADMIN — PROPOFOL 30 MG: 10 INJECTION, EMULSION INTRAVENOUS at 12:03

## 2019-03-07 RX ADMIN — LIDOCAINE HYDROCHLORIDE 100 MG: 20 INJECTION, SOLUTION INTRAVENOUS at 12:03

## 2019-03-07 RX ADMIN — SODIUM CHLORIDE, SODIUM LACTATE, POTASSIUM CHLORIDE, AND CALCIUM CHLORIDE: 600; 310; 30; 20 INJECTION, SOLUTION INTRAVENOUS at 12:03

## 2019-03-07 RX ADMIN — PROPOFOL 140 MG: 10 INJECTION, EMULSION INTRAVENOUS at 12:03

## 2019-03-07 NOTE — PLAN OF CARE
Sister in law at bedside   pablo spoke to pt and family. Discharge instructions given. Verbalize understanding.

## 2019-03-07 NOTE — [employer] PATIENT INSTRUCTIONS
Discharge Summary/Instructions after an Endoscopic Procedure  Patient Name: Wilman Neri  Patient MRN: [id number]  Patient YOB: 1970 Thursday, March 07, 2019 Estephanie Koch MD  RESTRICTIONS:  During your procedure today, you received medications for sedation.  These   medications may affect your judgment, balance and coordination.  Therefore,   for 24 hours, you have the following restrictions:   - DO NOT drive a car, operate machinery, make legal/financial decisions,   sign important papers or drink alcohol.    ACTIVITY:  Today: no heavy lifting, straining or running due to procedural   sedation/anesthesia.  The following day: return to full activity including work.  DIET:  Eat and drink normally unless instructed otherwise.     TREATMENT FOR COMMON SIDE EFFECTS:  - Mild abdominal pain, nausea, belching, bloating or excessive gas:  rest,   eat lightly and use a heating pad.  - Sore Throat: treat with throat lozenges and/or gargle with warm salt   water.  - Because air was used during the procedure, expelling large amounts of air   from your rectum or belching is normal.  - If a bowel prep was taken, you may not have a bowel movement for 1-3 days.    This is normal.  SYMPTOMS TO WATCH FOR AND REPORT TO YOUR PHYSICIAN:  1. Abdominal pain or bloating, other than gas cramps.  2. Chest pain.  3. Back pain.  4. Signs of infection such as: chills or fever occurring within 24 hours   after the procedure.  5. Rectal bleeding, which would show as bright red, maroon, or black stools.   (A tablespoon of blood from the rectum is not serious, especially if   hemorrhoids are present.)  6. Vomiting.  7. Weakness or dizziness.  GO DIRECTLY TO THE NEAREST EMERGENCY ROOM IF YOU HAVE ANY OF THE FOLLOWING:      Difficulty breathing              Chills and/or fever over 101 F   Persistent vomiting and/or vomiting blood   Severe abdominal pain   Severe chest pain   Black, tarry stools   Bleeding- more than one  tablespoon   Any other symptom or condition that you feel may need urgent attention  Your doctor recommends these additional instructions:  If any biopsies were taken, your doctors clinic will contact you in 1 to 2   weeks with any results.  - Patient has a contact number available for emergencies.  The signs and   symptoms of potential delayed complications were discussed with the   patient.  Return to normal activities tomorrow.  Written discharge   instructions were provided to the patient.   - Resume previous diet today.   - Continue present medications.   - Repeat colonoscopy in 10 years for screening purposes.   - Discharge patient to home (via wheelchair).  For questions, problems or results please call your physician Estephanie Koch MD at Work:  [phone]  If you have any questions about the above instructions, call the GI   department at [phone] or call the endoscopy unit at [phone]   from 7am until 3 pm.  [facility], EMERGENCY ROOM PHONE NUMBER:   [phone]  IF A COMPLICATION OR EMERGENCY SITUATION ARISES AND YOU ARE UNABLE TO REACH   YOUR PHYSICIAN - GO DIRECTLY TO THE EMERGENCY ROOM.  I have read or have had read to me these discharge instructions for my   procedure and have received a written copy.  I understand these   instructions and will follow-up with my physician if I have any questions.     __________________________________       _____________________________________  Nurse Signature                                          Patient/Designated   Responsible Party Signature  MD Estephanie Hooper MD  3/7/2019 12:40:31 PM  This report has been verified and signed electronically.  [employer]

## 2019-03-07 NOTE — DISCHARGE INSTRUCTIONS
Colonoscopy     A camera attached to a flexible tube with a viewing lens is used to take video pictures.     Colonoscopy is a test to view the inside of your lower digestive tract (colon and rectum). Sometimes it can show the last part of the small intestine (ileum). During the test, small pieces of tissue may be removed for testing. This is called a biopsy. Small growths, such as polyps, may also be removed.   Why is colonoscopy done?  The test is done to help look for colon cancer. And it can help find the source of abdominal pain, bleeding, and changes in bowel habits. It may be needed once a year, depending on factors such as your:  · Age  · Health history  · Family health history  · Symptoms  · Results from any prior colonoscopy  Risks and possible complications  These include:  · Bleeding               · A puncture or tear in the colon   · Risks of anesthesia  · A cancer lesion not being seen  Getting ready   To prepare for the test:  · Talk with your healthcare provider about the risks of the test (see below). Also ask your healthcare provider about alternatives to the test.  · Tell your healthcare provider about any medicines you take. Also tell him or her about any health conditions you may have.  · Make sure your rectum and colon are empty for the test. Follow the diet and bowel prep instructions exactly. If you dont, the test may need to be rescheduled.  · Plan for a friend or family member to drive you home after the test.     Colonoscopy provides an inside view of the entire colon.     You may discuss the results with your doctor right away or at a future visit.  During the test   The test is usually done in the hospital on an outpatient basis. This means you go home the same day. The procedure takes about 30 minutes. During that time:  · You are given relaxing (sedating) medicine through an IV line. You may be drowsy, or fully asleep.  · The healthcare provider will first give you a physical exam to  check for anal and rectal problems.  · Then the anus is lubricated and the scope inserted.  · If you are awake, you may have a feeling similar to needing to have a bowel movement. You may also feel pressure as air is pumped into the colon. Its OK to pass gas during the procedure.  · Biopsy, polyp removal, or other treatments may be done during the test.  After the test   You may have gas right after the test. It can help to try to pass it to help prevent later bloating. Your healthcare provider may discuss the results with you right away. Or you may need to schedule a follow-up visit to talk about the results. After the test, you can go back to your normal eating and other activities. You may be tired from the sedation and need to rest for a few hours.  Date Last Reviewed: 11/1/2016 © 2000-2017 [employer]. [address] [place] [address], [place] [address]. All rights reserved. This information is not intended as a substitute for professional medical care. Always follow your healthcare professional's instructions.

## 2019-03-07 NOTE — DISCHARGE SUMMARY
Endoscopy Discharge Summary      Admit Date: 3/7/2019    Discharge Date and Time:  3/7/2019 12:41 PM    Attending Physician: Estephanie Koch MD     Discharge Physician: Estephanie Koch MD     Principal Admitting Diagnoses: Colon cancer screening         Discharge Diagnosis: The encounter diagnosis was Colon cancer screening.     Discharged Condition: Good    Indication for Admission: Colon cancer screening     Hospital Course: Patient was admitted for an inpatient procedure and tolerated the procedure well with no complications.    Significant Diagnostic Studies: colonoscopy     Pathology (if any):  Specimen (12h ago, onward)    None          Estimated Blood Loss: 0 ml.    Discussed with: patient.    Disposition: Home.    Follow Up/Patient Instructions:   Current Discharge Medication List      CONTINUE these medications which have NOT CHANGED    Details   armodafinil (NUVIGIL) 250 mg tablet Take 1 tablet (250 mg total) by mouth once daily.  Qty: 90 tablet, Refills: 2      atorvastatin (LIPITOR) 20 MG tablet TAKE 1 TABLET ONCE DAILY  Qty: 90 tablet, Refills: 3      buPROPion (WELLBUTRIN SR) 150 MG TBSR 12 hr tablet Take 1 tablet (150 mg total) by mouth 2 (two) times daily.  Qty: 180 tablet, Refills: 3    Associated Diagnoses: Thyroid disease      eszopiclone (LUNESTA) 2 MG Tab Take 1 tablet (2 mg total) by mouth every evening.  Qty: 90 tablet, Refills: 1      levothyroxine (SYNTHROID) 200 MCG tablet Take 1 tablet (200 mcg total) by mouth once daily.  Qty: 90 tablet, Refills: 2    Comments: Please consider 90 day supplies to promote better adherence      lisinopril-hydrochlorothiazide (PRINZIDE,ZESTORETIC) 20-12.5 mg per tablet TAKE 1 TABLET ONCE DAILY  Qty: 90 tablet, Refills: 3      MELATONIN ORAL Take by mouth. Patient states that he takes 5 mg daily      metFORMIN (GLUCOPHAGE) 500 MG tablet TAKE ONE TABLET BY MOUTH TWICE DAILY WITH MEALS  Qty: 180 tablet, Refills: 3      paroxetine (PAXIL) 20 MG tablet Take 1  tablet (20 mg total) by mouth every morning.  Qty: 30 tablet, Refills: 11      propranolol (INDERAL LA) 80 MG 24 hr capsule Take 1 capsule (80 mg total) by mouth once daily.  Qty: 90 capsule, Refills: 3      diclofenac sodium 1 % Gel Apply 2 g topically 4 (four) times daily.  Qty: 1 Tube, Refills: 2      magnesium gluconate 27.5 mg (500 mg) Tab Take 500 mg by mouth once daily.      UNABLE TO FIND medication name: Co Q 10 patient states that he takes this medication daily         STOP taking these medications       metoprolol succinate (TOPROL-XL) 50 MG 24 hr tablet Comments:   Reason for Stopping:         sodium,potassium,mag sulfates (SUPREP BOWEL PREP KIT) 17.5-3.13-1.6 gram SolR Comments:   Reason for Stopping:               Discharge Procedure Orders   Diet general     Call MD for:  temperature >100.4     Call MD for:  persistent nausea and vomiting     Call MD for:  severe uncontrolled pain     Call MD for:  difficulty breathing, headache or visual disturbances     Activity as tolerated       Follow-up Information     Delores Lazar MD.    Specialty:  Family Medicine  Why:  As needed  Contact information:  [address] [address] [address] [phone]

## 2019-03-07 NOTE — ANESTHESIA PREPROCEDURE EVALUATION
03/07/2019  Wilman Neri is a 48 y.o., male.    Pre-op Assessment    I have reviewed the Patient Summary Reports.     I have reviewed the Nursing Notes.   I have reviewed the Medications.     Review of Systems  Anesthesia Hx:  No problems with previous Anesthesia    Social:  Former Smoker, Social Alcohol Use    Hematology/Oncology:  Hematology Normal   Oncology Normal     Cardiovascular:   Exercise tolerance: good Hypertension hyperlipidemia    Pulmonary:   Sleep Apnea    Renal/:  Renal/ Normal     Hepatic/GI:   Bowel Prep. 0900 last drink of fluid.  Tuesday last solid meal.   Musculoskeletal:  Musculoskeletal Normal    Neurological:   TIA, CVA, no residual symptoms   Peripheral Neuropathy    Endocrine:   Diabetes, well controlled, type 2 Hypothyroidism    Psych:   Psychiatric History          Physical Exam  General:  Well nourished    Airway/Jaw/Neck:  Airway Findings: Mallampati: III                Anesthesia Plan  Type of Anesthesia, risks & benefits discussed:  Anesthesia Type:  MAC  Patient's Preference:   Intra-op Monitoring Plan:   Intra-op Monitoring Plan Comments:   Post Op Pain Control Plan:   Post Op Pain Control Plan Comments:   Induction:   IV  Beta Blocker:  Patient is on a Beta-Blocker and has received one dose within the past 24 hours (No further documentation required).       Informed Consent: Patient understands risks and agrees with Anesthesia plan.  Questions answered. Anesthesia consent signed with patient.  ASA Score: 3     Day of Surgery Review of History & Physical: I have interviewed and examined the patient. I have reviewed the patient's H&P dated: 03/07/19. There are no significant changes.  H&P update referred to the surgeon.

## 2019-03-07 NOTE — ANESTHESIA POSTPROCEDURE EVALUATION
"Anesthesia Post Evaluation    Patient: Wilman Neri    Procedure(s) Performed: Procedure(s) (LRB):  COLONOSCOPY (N/A)    Final Anesthesia Type: MAC  Patient location during evaluation: PACU  Patient participation: Yes- Able to Participate  Level of consciousness: awake and alert and oriented  Post-procedure vital signs: reviewed and stable  Pain management: adequate  Airway patency: patent  PONV status at discharge: No PONV  Anesthetic complications: no      Cardiovascular status: blood pressure returned to baseline  Respiratory status: unassisted, room air and spontaneous ventilation  Hydration status: euvolemic  Follow-up not needed.        Visit Vitals  BP (!) 151/36 (BP Location: Left arm, Patient Position: Lying)   Pulse 80   Temp 36.6 °C (97.9 °F) (Oral)   Resp 16   Ht 5' 11" (1.803 m)   Wt 93.9 kg (207 lb)   SpO2 (!) 94%   BMI 28.87 kg/m²       Pain/Bindu Score: Bindu Score: 9 (3/7/2019 12:43 PM)        "

## 2019-03-07 NOTE — TRANSFER OF CARE
"Anesthesia Transfer of Care Note    Patient: Wilman Neri    Procedure(s) Performed: Procedure(s) (LRB):  COLONOSCOPY (N/A)    Patient location: PACU    Anesthesia Type: MAC    Transport from OR: Transported from OR on room air with adequate spontaneous ventilation    Post pain: adequate analgesia    Post assessment: no apparent anesthetic complications    Post vital signs: stable    Level of consciousness: awake and alert    Nausea/Vomiting: no nausea/vomiting    Complications: none    Transfer of care protocol was followed      Last vitals:   Visit Vitals  BP (!) 151/36 (BP Location: Left arm, Patient Position: Lying)   Pulse 80   Temp 36.6 °C (97.9 °F) (Oral)   Resp 16   Ht 5' 11" (1.803 m)   Wt 93.9 kg (207 lb)   SpO2 (!) 94%   BMI 28.87 kg/m²     "

## 2019-03-07 NOTE — H&P
PRE PROCEDURE H&P    Patient Name: Wilman Neri  MRN: [id number]  : 1970  Date of Procedure:  3/7/2019  Referring Physician: Delores Lazar MD  Primary Physician: Delores Lazar MD  Procedure Physician: Estephanie Koch MD       Planned Procedure: Colonoscopy  Diagnosis: screening for colon cancer  Chief Complaint: Same as above    HPI: Patient is an 48 y.o. male is here for colorectal cancer screening.  No prior colonoscopies, no family history of colon cancer.      Past Medical History:   Past Medical History:   Diagnosis Date    Anxiety     Depression     Diabetes mellitus     Hypertension     Hypothyroid     Sleep disorder     Stroke         Past Surgical History:  Past Surgical History:   Procedure Laterality Date    BACK SURGERY      2 juan pablo 6 bolts lower back     EXTERNAL EAR SURGERY      MODIFIED RADICAL MASTECTOMY W/ AXILLARY LYMPH NODE DISSECTION          Home Medications:  Prior to Admission medications    Medication Sig Start Date End Date Taking? Authorizing Provider   armodafinil (NUVIGIL) 250 mg tablet Take 1 tablet (250 mg total) by mouth once daily. 19  Yes Delores Lazar MD   atorvastatin (LIPITOR) 20 MG tablet TAKE 1 TABLET ONCE DAILY 18  Yes Delores Lazar MD   buPROPion (WELLBUTRIN SR) 150 MG TBSR 12 hr tablet Take 1 tablet (150 mg total) by mouth 2 (two) times daily. 10/23/18 10/23/19 Yes Delores Lazar MD   eszopiclone (LUNESTA) 2 MG Tab Take 1 tablet (2 mg total) by mouth every evening. 19  Yes Delores Lazar MD   levothyroxine (SYNTHROID) 200 MCG tablet Take 1 tablet (200 mcg total) by mouth once daily. 19  Yes Delores Lazar MD   lisinopril-hydrochlorothiazide (PRINZIDE,ZESTORETIC) 20-12.5 mg per tablet TAKE 1 TABLET ONCE DAILY 18  Yes Delores Lazar MD   MELATONIN ORAL Take by mouth. Patient states that he takes 5 mg daily   Yes Historical Provider, MD   metFORMIN (GLUCOPHAGE) 500 MG tablet TAKE ONE TABLET BY MOUTH TWICE DAILY WITH MEALS 18  Yes Delores  MD Cady   paroxetine (PAXIL) 20 MG tablet Take 1 tablet (20 mg total) by mouth every morning. 4/25/18 4/25/19 Yes Delores Lazar MD   propranolol (INDERAL LA) 80 MG 24 hr capsule Take 1 capsule (80 mg total) by mouth once daily. 1/31/19 1/31/20 Yes Delores Lazar MD   diclofenac sodium 1 % Gel Apply 2 g topically 4 (four) times daily. 12/28/17 1/7/18  Bonifacio Liang MD   magnesium gluconate 27.5 mg (500 mg) Tab Take 500 mg by mouth once daily. 7/7/17   Historical Provider, MD   metoprolol succinate (TOPROL-XL) 50 MG 24 hr tablet Take 1 tablet (50 mg total) by mouth once daily. 1/29/19   Delores Lazar MD   sodium,potassium,mag sulfates (SUPREP BOWEL PREP KIT) 17.5-3.13-1.6 gram SolR As directed 2/5/19   Iggy Nichole PA-C   UNABLE TO FIND medication name: Co Q 10 patient states that he takes this medication daily    Historical Provider, MD        Allergies:  Review of patient's allergies indicates:   Allergen Reactions    Bee pollens         Social History:   Social History     Socioeconomic History    Marital status:      Spouse name: Not on file    Number of children: Not on file    Years of education: Not on file    Highest education level: Not on file   Social Needs    Financial resource strain: Not on file    Food insecurity - worry: Not on file    Food insecurity - inability: Not on file    Transportation needs - medical: Not on file    Transportation needs - non-medical: Not on file   Occupational History    Not on file   Tobacco Use    Smoking status: Former Smoker     Packs/day: 1.00     Types: Vaping w/o nicotine    Smokeless tobacco: Never Used    Tobacco comment: vape    Substance and Sexual Activity    Alcohol use: No     Comment: couple times a year    Drug use: No    Sexual activity: Yes     Partners: Female     Birth control/protection: None   Other Topics Concern    Not on file   Social History Narrative    Not on file       Family History:  Family History  "  Problem Relation Age of Onset    Cancer Mother     Cataracts Mother     Cancer Father     Cataracts Father     Macular degeneration Father     Diabetes Maternal Grandmother     Blindness Neg Hx     Glaucoma Neg Hx     Hypertension Neg Hx     Retinal detachment Neg Hx     Strabismus Neg Hx     Stroke Neg Hx     Thyroid disease Neg Hx        ROS: No acute cardiac events, no acute respiratory complaints.     Physical Exam (all patients):    BP (!) 139/93 (BP Location: Left arm, Patient Position: Lying)   Pulse 80   Temp 97.9 °F (36.6 °C) (Temporal)   Resp 17   Ht 5' 11" (1.803 m)   Wt 93.9 kg (207 lb)   SpO2 98%   BMI 28.87 kg/m²   Lungs: Clear to auscultation bilaterally, respirations unlabored  Heart: Regular rate and rhythm, S1 and S2 normal, no obvious murmurs  Abdomen:  Soft, non-tender, bowel sounds normal, no masses, no organomegaly    Lab Results   Component Value Date    WBC 9.55 01/29/2019    MCV 85 01/29/2019    RDW 12.5 01/29/2019     01/29/2019    INR 1.0 02/12/2008    GLU 88 01/29/2019    HGBA1C 5.9 (H) 01/29/2019    BUN 12 01/29/2019     (L) 01/29/2019    K 4.0 01/29/2019    CL 98 01/29/2019        SEDATION PLAN: MAC       History reviewed, vital signs satisfactory, cardiopulmonary status satisfactory, sedation options, risks and plans have been discussed with the patient. All their questions were answered and the patient agrees to the sedation procedures as planned and the patient is deemed an appropriate candidate for the sedation as planned.    Procedure explained to patient, informed consent obtained and placed in chart.    Estephanie Koch  3/7/2019  12:13 PM   "

## 2019-03-07 NOTE — ANESTHESIA RELEASE NOTE
"Anesthesia Release from PACU Note    Patient: Wilman Neri    Procedure(s) Performed: Procedure(s) (LRB):  COLONOSCOPY (N/A)    Anesthesia type: MAC    Post pain: Adequate analgesia    Post assessment: no apparent anesthetic complications, tolerated procedure well and no evidence of recall    Last Vitals:   Visit Vitals  BP (!) 151/36 (BP Location: Left arm, Patient Position: Lying)   Pulse 80   Temp 36.6 °C (97.9 °F) (Oral)   Resp 16   Ht 5' 11" (1.803 m)   Wt 93.9 kg (207 lb)   SpO2 (!) 94%   BMI 28.87 kg/m²       Post vital signs: stable    Level of consciousness: awake, alert  and oriented    Nausea/Vomiting: no nausea/no vomiting    Complications: none    Airway Patency: patent    Respiratory: unassisted, spontaneous ventilation, room air    Cardiovascular: stable    Hydration: euvolemic  "

## 2019-03-08 VITALS
OXYGEN SATURATION: 96 % | WEIGHT: 207 LBS | RESPIRATION RATE: 20 BRPM | BODY MASS INDEX: 28.98 KG/M2 | TEMPERATURE: 98 F | DIASTOLIC BLOOD PRESSURE: 58 MMHG | HEART RATE: 72 BPM | SYSTOLIC BLOOD PRESSURE: 115 MMHG | HEIGHT: 71 IN

## 2019-03-14 ENCOUNTER — PATIENT MESSAGE (OUTPATIENT)
Dept: FAMILY MEDICINE | Facility: CLINIC | Age: 49
End: 2019-03-14

## 2019-03-28 RX ORDER — PAROXETINE HYDROCHLORIDE 20 MG/1
TABLET, FILM COATED ORAL
Qty: 30 TABLET | Refills: 11 | Status: SHIPPED | OUTPATIENT
Start: 2019-03-28 | End: 2020-03-15

## 2019-06-26 RX ORDER — ATORVASTATIN CALCIUM 20 MG/1
TABLET, FILM COATED ORAL
Qty: 90 TABLET | Refills: 3 | Status: SHIPPED | OUTPATIENT
Start: 2019-06-26 | End: 2020-04-14 | Stop reason: SDUPTHER

## 2019-07-09 RX ORDER — ESZOPICLONE 2 MG/1
2 TABLET, FILM COATED ORAL NIGHTLY
Qty: 90 TABLET | Refills: 1 | Status: SHIPPED | OUTPATIENT
Start: 2019-07-09 | End: 2020-01-08 | Stop reason: SDUPTHER

## 2019-07-29 ENCOUNTER — PATIENT OUTREACH (OUTPATIENT)
Dept: ADMINISTRATIVE | Facility: HOSPITAL | Age: 49
End: 2019-07-29

## 2019-07-30 ENCOUNTER — PATIENT MESSAGE (OUTPATIENT)
Dept: FAMILY MEDICINE | Facility: CLINIC | Age: 49
End: 2019-07-30

## 2019-07-30 ENCOUNTER — LAB VISIT (OUTPATIENT)
Dept: LAB | Facility: HOSPITAL | Age: 49
End: 2019-07-30
Attending: FAMILY MEDICINE
Payer: COMMERCIAL

## 2019-07-30 ENCOUNTER — OFFICE VISIT (OUTPATIENT)
Dept: FAMILY MEDICINE | Facility: CLINIC | Age: 49
End: 2019-07-30
Payer: COMMERCIAL

## 2019-07-30 VITALS
SYSTOLIC BLOOD PRESSURE: 120 MMHG | HEIGHT: 71 IN | DIASTOLIC BLOOD PRESSURE: 78 MMHG | TEMPERATURE: 99 F | OXYGEN SATURATION: 96 % | BODY MASS INDEX: 31.65 KG/M2 | HEART RATE: 91 BPM | WEIGHT: 226.06 LBS

## 2019-07-30 DIAGNOSIS — G47.00 INSOMNIA, UNSPECIFIED TYPE: ICD-10-CM

## 2019-07-30 DIAGNOSIS — N48.9 PENILE LESION: ICD-10-CM

## 2019-07-30 DIAGNOSIS — E03.4 HYPOTHYROIDISM DUE TO ACQUIRED ATROPHY OF THYROID: ICD-10-CM

## 2019-07-30 DIAGNOSIS — E11.8 CONTROLLED TYPE 2 DIABETES MELLITUS WITH COMPLICATION, WITHOUT LONG-TERM CURRENT USE OF INSULIN: ICD-10-CM

## 2019-07-30 DIAGNOSIS — N52.9 ERECTILE DYSFUNCTION, UNSPECIFIED ERECTILE DYSFUNCTION TYPE: ICD-10-CM

## 2019-07-30 DIAGNOSIS — I10 ESSENTIAL HYPERTENSION: Primary | ICD-10-CM

## 2019-07-30 LAB
ALBUMIN SERPL BCP-MCNC: 4.1 G/DL (ref 3.5–5.2)
ALP SERPL-CCNC: 59 U/L (ref 55–135)
ALT SERPL W/O P-5'-P-CCNC: 60 U/L (ref 10–44)
ANION GAP SERPL CALC-SCNC: 12 MMOL/L (ref 8–16)
AST SERPL-CCNC: 29 U/L (ref 10–40)
BASOPHILS # BLD AUTO: 0.08 K/UL (ref 0–0.2)
BASOPHILS NFR BLD: 0.9 % (ref 0–1.9)
BILIRUB SERPL-MCNC: 0.4 MG/DL (ref 0.1–1)
BUN SERPL-MCNC: 11 MG/DL (ref 6–20)
CALCIUM SERPL-MCNC: 9.9 MG/DL (ref 8.7–10.5)
CHLORIDE SERPL-SCNC: 100 MMOL/L (ref 95–110)
CHOLEST SERPL-MCNC: 157 MG/DL (ref 120–199)
CHOLEST/HDLC SERPL: 4.2 {RATIO} (ref 2–5)
CO2 SERPL-SCNC: 25 MMOL/L (ref 23–29)
CREAT SERPL-MCNC: 1 MG/DL (ref 0.5–1.4)
DIFFERENTIAL METHOD: ABNORMAL
EOSINOPHIL # BLD AUTO: 0.4 K/UL (ref 0–0.5)
EOSINOPHIL NFR BLD: 4.6 % (ref 0–8)
ERYTHROCYTE [DISTWIDTH] IN BLOOD BY AUTOMATED COUNT: 13.2 % (ref 11.5–14.5)
EST. GFR  (AFRICAN AMERICAN): >60 ML/MIN/1.73 M^2
EST. GFR  (NON AFRICAN AMERICAN): >60 ML/MIN/1.73 M^2
ESTIMATED AVG GLUCOSE: 123 MG/DL (ref 68–131)
GLUCOSE SERPL-MCNC: 105 MG/DL (ref 70–110)
HBA1C MFR BLD HPLC: 5.9 % (ref 4–5.6)
HCT VFR BLD AUTO: 46.7 % (ref 40–54)
HDLC SERPL-MCNC: 37 MG/DL (ref 40–75)
HDLC SERPL: 23.6 % (ref 20–50)
HGB BLD-MCNC: 15.3 G/DL (ref 14–18)
IMM GRANULOCYTES # BLD AUTO: 0.02 K/UL (ref 0–0.04)
IMM GRANULOCYTES NFR BLD AUTO: 0.2 % (ref 0–0.5)
LDLC SERPL CALC-MCNC: 47.2 MG/DL (ref 63–159)
LYMPHOCYTES # BLD AUTO: 2.8 K/UL (ref 1–4.8)
LYMPHOCYTES NFR BLD: 31.5 % (ref 18–48)
MCH RBC QN AUTO: 29.1 PG (ref 27–31)
MCHC RBC AUTO-ENTMCNC: 32.8 G/DL (ref 32–36)
MCV RBC AUTO: 89 FL (ref 82–98)
MONOCYTES # BLD AUTO: 0.9 K/UL (ref 0.3–1)
MONOCYTES NFR BLD: 9.6 % (ref 4–15)
NEUTROPHILS # BLD AUTO: 4.8 K/UL (ref 1.8–7.7)
NEUTROPHILS NFR BLD: 53.2 % (ref 38–73)
NONHDLC SERPL-MCNC: 120 MG/DL
NRBC BLD-RTO: 0 /100 WBC
PLATELET # BLD AUTO: 280 K/UL (ref 150–350)
PMV BLD AUTO: 9.1 FL (ref 9.2–12.9)
POTASSIUM SERPL-SCNC: 3.9 MMOL/L (ref 3.5–5.1)
PROT SERPL-MCNC: 7.1 G/DL (ref 6–8.4)
RBC # BLD AUTO: 5.26 M/UL (ref 4.6–6.2)
SODIUM SERPL-SCNC: 137 MMOL/L (ref 136–145)
TRIGL SERPL-MCNC: 364 MG/DL (ref 30–150)
TSH SERPL DL<=0.005 MIU/L-ACNC: 3.63 UIU/ML (ref 0.4–4)
WBC # BLD AUTO: 8.97 K/UL (ref 3.9–12.7)

## 2019-07-30 PROCEDURE — 80053 COMPREHEN METABOLIC PANEL: CPT

## 2019-07-30 PROCEDURE — 36415 COLL VENOUS BLD VENIPUNCTURE: CPT | Mod: PO

## 2019-07-30 PROCEDURE — 84443 ASSAY THYROID STIM HORMONE: CPT

## 2019-07-30 PROCEDURE — 99214 PR OFFICE/OUTPT VISIT, EST, LEVL IV, 30-39 MIN: ICD-10-PCS | Mod: S$GLB,,, | Performed by: FAMILY MEDICINE

## 2019-07-30 PROCEDURE — 99214 OFFICE O/P EST MOD 30 MIN: CPT | Mod: S$GLB,,, | Performed by: FAMILY MEDICINE

## 2019-07-30 PROCEDURE — 85025 COMPLETE CBC W/AUTO DIFF WBC: CPT

## 2019-07-30 PROCEDURE — 80061 LIPID PANEL: CPT

## 2019-07-30 PROCEDURE — 99999 PR PBB SHADOW E&M-EST. PATIENT-LVL III: CPT | Mod: PBBFAC,,, | Performed by: FAMILY MEDICINE

## 2019-07-30 PROCEDURE — 99999 PR PBB SHADOW E&M-EST. PATIENT-LVL III: ICD-10-PCS | Mod: PBBFAC,,, | Performed by: FAMILY MEDICINE

## 2019-07-30 PROCEDURE — 83036 HEMOGLOBIN GLYCOSYLATED A1C: CPT

## 2019-07-30 RX ORDER — UBIDECARENONE 30 MG
30 CAPSULE ORAL 3 TIMES DAILY
COMMUNITY

## 2019-07-30 RX ORDER — LISINOPRIL AND HYDROCHLOROTHIAZIDE 12.5; 2 MG/1; MG/1
1 TABLET ORAL DAILY
Qty: 90 TABLET | Refills: 2 | Status: SHIPPED | OUTPATIENT
Start: 2019-07-30 | End: 2020-04-14 | Stop reason: SDUPTHER

## 2019-07-30 RX ORDER — METFORMIN HYDROCHLORIDE 500 MG/1
500 TABLET ORAL 2 TIMES DAILY WITH MEALS
Qty: 180 TABLET | Refills: 2 | Status: SHIPPED | OUTPATIENT
Start: 2019-07-30 | End: 2020-04-14 | Stop reason: SDUPTHER

## 2019-07-30 NOTE — PROGRESS NOTES
Chief Complaint:    Chief Complaint   Patient presents with    Follow-up       History of Present Illness:    Presents today for six-month follow-up:  Doing okay he has diabetes type 2 he has managing it well with diet he has gained some weight because he was on a low carb diet  Blood pressure in he says at home is okay  Takes Lunesta for insomnia  He has shift work disorder where well controlled with current medication  A he wants to see a urologist he says he has a lesion on his penis he says his look okay    Also get a slight cough in the evening when he lays down but it does not have any other symptoms no fever weight loss no cough at any other times he had some reflux symptoms as well.  ROS:  Review of Systems   Constitutional: Negative for activity change, chills, fatigue, fever and unexpected weight change.   HENT: Negative for congestion, ear discharge, ear pain, hearing loss, postnasal drip and rhinorrhea.    Eyes: Negative for pain and visual disturbance.   Respiratory: Negative for cough, chest tightness and shortness of breath.    Cardiovascular: Negative for chest pain and palpitations.   Gastrointestinal: Negative for abdominal pain, diarrhea and vomiting.   Endocrine: Negative for heat intolerance.   Genitourinary: Negative for dysuria, flank pain, frequency and hematuria.   Musculoskeletal: Negative for back pain, gait problem and neck pain.   Skin: Negative for color change and rash.   Neurological: Negative for dizziness, tremors, seizures, numbness and headaches.   Psychiatric/Behavioral: Negative for agitation, hallucinations, self-injury, sleep disturbance and suicidal ideas. The patient is not nervous/anxious.        Past Medical History:   Diagnosis Date    Anxiety     Depression     Diabetes mellitus     Hypertension     Hypothyroid     Sleep disorder     Stroke        Social History:  Social History     Socioeconomic History    Marital status:      Spouse name: Not on file     Number of children: Not on file    Years of education: Not on file    Highest education level: Not on file   Occupational History    Not on file   Social Needs    Financial resource strain: Not on file    Food insecurity:     Worry: Not on file     Inability: Not on file    Transportation needs:     Medical: Not on file     Non-medical: Not on file   Tobacco Use    Smoking status: Former Smoker     Packs/day: 1.00     Types: Vaping w/o nicotine    Smokeless tobacco: Never Used    Tobacco comment: vape    Substance and Sexual Activity    Alcohol use: No     Comment: couple times a year    Drug use: No    Sexual activity: Yes     Partners: Female     Birth control/protection: None   Lifestyle    Physical activity:     Days per week: Not on file     Minutes per session: Not on file    Stress: Not on file   Relationships    Social connections:     Talks on phone: Not on file     Gets together: Not on file     Attends Mandaeism service: Not on file     Active member of club or organization: Not on file     Attends meetings of clubs or organizations: Not on file     Relationship status: Not on file   Other Topics Concern    Not on file   Social History Narrative    Not on file       Family History:   family history includes Cancer in his father and mother; Cataracts in his father and mother; Diabetes in his maternal grandmother; Macular degeneration in his father.    Health Maintenance   Topic Date Due    Pneumococcal Vaccine (Medium Risk) (1 of 1 - PPSV23) 04/28/1987    Hemoglobin A1c  07/29/2019    Influenza Vaccine  08/01/2019    Foot Exam  01/29/2020    Lipid Panel  01/29/2020    Eye Exam  03/01/2020    Low Dose Statin  07/30/2020    TETANUS VACCINE  05/14/2025    Colonoscopy  03/07/2029       Physical Exam:    Vital Signs  Temp: 99.1 °F (37.3 °C)  Temp src: Temporal  Pulse: 91  SpO2: 96 %  BP: 120/78  BP Location: Left arm  Patient Position: Sitting  Pain Score: 0-No pain  Height and  "Weight  Height: 5' 11" (180.3 cm)  Weight: 102.6 kg (226 lb 1.3 oz)  BSA (Calculated - sq m): 2.27 sq meters  BMI (Calculated): 31.6  Weight in (lb) to have BMI = 25: 178.9]    Body mass index is 31.53 kg/m².    Physical Exam   Constitutional: He is oriented to person, place, and time. He appears well-developed.   HENT:   Mouth/Throat: Oropharynx is clear and moist.   Eyes: Pupils are equal, round, and reactive to light. Conjunctivae are normal.   Neck: Normal range of motion. Neck supple.   Cardiovascular: Normal rate, regular rhythm and normal heart sounds.   No murmur heard.  Pulses:       Dorsalis pedis pulses are 1+ on the right side, and 0 on the left side.        Posterior tibial pulses are 1+ on the right side, and 0 on the left side.   Pulmonary/Chest: Effort normal and breath sounds normal. No respiratory distress. He has no wheezes. He has no rales. He exhibits no tenderness.   Abdominal: Soft. He exhibits no distension and no mass. There is no tenderness. There is no guarding.   Musculoskeletal: He exhibits no edema or tenderness.   Feet:   Right Foot:   Protective Sensation: 10 sites tested. 6 sites sensed.   Left Foot:   Protective Sensation: 10 sites tested. 6 sites sensed.   Lymphadenopathy:     He has no cervical adenopathy.   Neurological: He is alert and oriented to person, place, and time. He has normal reflexes.   Skin: Skin is warm and dry.   Psychiatric: He has a normal mood and affect. His behavior is normal. Judgment and thought content normal.         Diabetes Management Status    Statin: Taking  ACE/ARB: Taking    Screening or Prevention Patient's value Goal Complete/Controlled?   HgA1C Testing and Control   Lab Results   Component Value Date    HGBA1C 5.9 (H) 01/29/2019      Annually/Less than 8% Yes   Lipid profile : 01/29/2019 Annually Yes   LDL control Lab Results   Component Value Date    LDLCALC 79.6 01/29/2019    Annually/Less than 100 mg/dl  Yes   Nephropathy screening No results " found for: LABMICR  Lab Results   Component Value Date    PROTEINUA NEG 10/24/2006    Annually No   Blood pressure BP Readings from Last 1 Encounters:   07/30/19 120/78    Less than 140/90 Yes   Dilated retinal exam : 03/01/2019 Annually No   Foot exam   : 01/29/2019 Annually Yes       Assessment:      ICD-10-CM ICD-9-CM   1. Essential hypertension I10 401.9   2. Penile lesion N48.9 607.89   3. Hypothyroidism due to acquired atrophy of thyroid E03.4 244.8     246.8   4. Erectile dysfunction, unspecified erectile dysfunction type N52.9 607.84   5. Insomnia, unspecified type G47.00 780.52   6. Controlled type 2 diabetes mellitus with complication, without long-term current use of insulin E11.8 250.90         Plan:  General reflux precautions given to the patient take Zantac few hours before going to bed  If the cough continues please let us know may need EGD further testing  Will refer   lesion  Please work on weight loss  Follow-up 6 months  Orders Placed This Encounter   Procedures    CBC auto differential    Comprehensive metabolic panel    Lipid panel    Hemoglobin A1c    TSH    Ambulatory referral to Urology       Current Outpatient Medications   Medication Sig Dispense Refill    armodafinil (NUVIGIL) 250 mg tablet Take 1 tablet (250 mg total) by mouth once daily. 90 tablet 2    atorvastatin (LIPITOR) 20 MG tablet TAKE 1 TABLET ONCE DAILY 90 tablet 3    co-enzyme Q-10 30 mg capsule Take 30 mg by mouth 3 (three) times daily.      eszopiclone (LUNESTA) 2 MG Tab Take 1 tablet (2 mg total) by mouth every evening. 90 tablet 1    levothyroxine (SYNTHROID) 200 MCG tablet Take 1 tablet (200 mcg total) by mouth once daily. 90 tablet 2    lisinopril-hydrochlorothiazide (PRINZIDE,ZESTORETIC) 20-12.5 mg per tablet Take 1 tablet by mouth once daily. 90 tablet 2    MELATONIN ORAL Take by mouth. Patient states that he takes 5 mg daily      metFORMIN (GLUCOPHAGE) 500 MG tablet Take 1 tablet (500 mg total) by mouth  2 (two) times daily with meals. 180 tablet 2    paroxetine (PAXIL) 20 MG tablet TAKE 1 TABLET EVERY MORNING 30 tablet 11    propranolol (INDERAL LA) 80 MG 24 hr capsule Take 1 capsule (80 mg total) by mouth once daily. 90 capsule 3    diclofenac sodium 1 % Gel Apply 2 g topically 4 (four) times daily. 1 Tube 2     No current facility-administered medications for this visit.        Medications Discontinued During This Encounter   Medication Reason    buPROPion (WELLBUTRIN SR) 150 MG TBSR 12 hr tablet Patient no longer taking    magnesium gluconate 27.5 mg (500 mg) Tab Patient no longer taking    UNABLE TO FIND Duplicate Order    lisinopril-hydrochlorothiazide (PRINZIDE,ZESTORETIC) 20-12.5 mg per tablet Reorder    metFORMIN (GLUCOPHAGE) 500 MG tablet Reorder       Follow up in about 6 months (around 1/30/2020).      Delores Lazar MD

## 2019-10-08 ENCOUNTER — OFFICE VISIT (OUTPATIENT)
Dept: DERMATOLOGY | Facility: CLINIC | Age: 49
End: 2019-10-08
Payer: COMMERCIAL

## 2019-10-08 DIAGNOSIS — D48.5 NEOPLASM OF UNCERTAIN BEHAVIOR OF SKIN: Primary | ICD-10-CM

## 2019-10-08 PROCEDURE — 11102 PR TANGENTIAL BIOPSY, SKIN, SINGLE LESION: ICD-10-PCS | Mod: S$GLB,,, | Performed by: STUDENT IN AN ORGANIZED HEALTH CARE EDUCATION/TRAINING PROGRAM

## 2019-10-08 PROCEDURE — 88305 TISSUE EXAM BY PATHOLOGIST: CPT | Mod: 26,,, | Performed by: PATHOLOGY

## 2019-10-08 PROCEDURE — 99999 PR PBB SHADOW E&M-EST. PATIENT-LVL III: CPT | Mod: PBBFAC,,, | Performed by: STUDENT IN AN ORGANIZED HEALTH CARE EDUCATION/TRAINING PROGRAM

## 2019-10-08 PROCEDURE — 88305 TISSUE EXAM BY PATHOLOGIST: CPT | Performed by: PATHOLOGY

## 2019-10-08 PROCEDURE — 88305 TISSUE SPECIMEN TO PATHOLOGY, DERMATOLOGY: ICD-10-PCS | Mod: 26,,, | Performed by: PATHOLOGY

## 2019-10-08 PROCEDURE — 11102 TANGNTL BX SKIN SINGLE LES: CPT | Mod: S$GLB,,, | Performed by: STUDENT IN AN ORGANIZED HEALTH CARE EDUCATION/TRAINING PROGRAM

## 2019-10-08 PROCEDURE — 99202 OFFICE O/P NEW SF 15 MIN: CPT | Mod: S$GLB,,, | Performed by: STUDENT IN AN ORGANIZED HEALTH CARE EDUCATION/TRAINING PROGRAM

## 2019-10-08 PROCEDURE — 99999 PR PBB SHADOW E&M-EST. PATIENT-LVL III: ICD-10-PCS | Mod: PBBFAC,,, | Performed by: STUDENT IN AN ORGANIZED HEALTH CARE EDUCATION/TRAINING PROGRAM

## 2019-10-08 PROCEDURE — 99202 PR OFFICE/OUTPT VISIT, NEW, LEVL II, 15-29 MIN: ICD-10-PCS | Mod: S$GLB,,, | Performed by: STUDENT IN AN ORGANIZED HEALTH CARE EDUCATION/TRAINING PROGRAM

## 2019-10-08 NOTE — PROGRESS NOTES
Subjective:       Patient ID:  Wilman Neri is a 49 y.o. male who presents for   Chief Complaint   Patient presents with    Mass     right shoulder     History of Present Illness: The patient presents with chief complaint of a skin lesion  Location: right shoulder   Duration: 1 month  Signs/Symptoms: increased in size, bothersome, tender, scab to it   Prior treatments: none       Review of Systems   Skin: Negative for itching, rash and dry skin.        Objective:    Physical Exam   Constitutional: He appears well-developed and well-nourished. No distress.   Neurological: He is alert and oriented to person, place, and time. He is not disoriented.   Psychiatric: He has a normal mood and affect.   Skin:   Areas Examined (abnormalities noted in diagram):   Head / Face Inspection Performed  Neck Inspection Performed  RUE Inspected  LUE Inspection Performed              Diagram Legend     Erythematous scaling macule/papule c/w actinic keratosis       Vascular papule c/w angioma      Pigmented verrucoid papule/plaque c/w seborrheic keratosis      Yellow umbilicated papule c/w sebaceous hyperplasia      Irregularly shaped tan macule c/w lentigo     1-2 mm smooth white papules consistent with Milia      Movable subcutaneous cyst with punctum c/w epidermal inclusion cyst      Subcutaneous movable cyst c/w pilar cyst      Firm pink to brown papule c/w dermatofibroma      Pedunculated fleshy papule(s) c/w skin tag(s)      Evenly pigmented macule c/w junctional nevus     Mildly variegated pigmented, slightly irregular-bordered macule c/w mildly atypical nevus      Flesh colored to evenly pigmented papule c/w intradermal nevus       Pink pearly papule/plaque c/w basal cell carcinoma      Erythematous hyperkeratotic cursted plaque c/w SCC      Surgical scar with no sign of skin cancer recurrence      Open and closed comedones      Inflammatory papules and pustules      Verrucoid papule consistent consistent with wart      Erythematous eczematous patches and plaques     Dystrophic onycholytic nail with subungual debris c/w onychomycosis     Umbilicated papule    Erythematous-base heme-crusted tan verrucoid plaque consistent with inflamed seborrheic keratosis     Erythematous Silvery Scaling Plaque c/w Psoriasis     See annotation          Assessment / Plan:      Pathology Orders:     Normal Orders This Visit    Tissue Specimen To Pathology, Dermatology     Questions:    Directional Terms:  Other(comment)    Clinical Information:  r/o SCC vs other    Specific Site:  right shoulder        Neoplasm of uncertain behavior of skin  -     Tissue Specimen To Pathology, Dermatology  Shave biopsy procedure note:    Shave biopsy performed after verbal consent including risk of infection, scar, recurrence, need for additional treatment of site. Area prepped with alcohol, anesthetized with approximately 1.0cc of 1% lidocaine with epinephrine. Lesional tissue shaved with razor blade. Hemostasis achieved with application of aluminum chloride followed by hyfrecation. No complications. Dressing applied. Wound care explained.    If biopsy positive, schedule procedure for definitive excision.            Follow up in about 3 months (around 1/8/2020).

## 2019-10-08 NOTE — PROGRESS NOTES
History of Present Illness: The patient presents with chief complaint of bump/cyst   Location: right shoulder   Duration: 1 month  Signs/Symptoms: increased in size, bothersome, tender, scab to it     Prior treatments: tx foot grater to it .

## 2019-10-08 NOTE — PATIENT INSTRUCTIONS

## 2019-10-16 RX ORDER — LEVOTHYROXINE SODIUM 200 UG/1
TABLET ORAL
Qty: 90 TABLET | Refills: 2 | Status: SHIPPED | OUTPATIENT
Start: 2019-10-16 | End: 2020-04-14 | Stop reason: SDUPTHER

## 2019-10-18 ENCOUNTER — TELEPHONE (OUTPATIENT)
Dept: DERMATOLOGY | Facility: CLINIC | Age: 49
End: 2019-10-18

## 2019-10-18 ENCOUNTER — PATIENT MESSAGE (OUTPATIENT)
Dept: DERMATOLOGY | Facility: CLINIC | Age: 49
End: 2019-10-18

## 2019-10-18 NOTE — TELEPHONE ENCOUNTER
----- Message from Valeri Cerda sent at 10/18/2019 11:21 AM CDT -----  Contact: pt  Would like a call back from nurse regarding his test results. Please give a call back at [phone].      Thanks,  Valeri RODRIGES

## 2019-10-31 ENCOUNTER — PROCEDURE VISIT (OUTPATIENT)
Dept: DERMATOLOGY | Facility: CLINIC | Age: 49
End: 2019-10-31
Payer: COMMERCIAL

## 2019-10-31 DIAGNOSIS — C44.92 SCC (SQUAMOUS CELL CARCINOMA): Primary | ICD-10-CM

## 2019-10-31 PROCEDURE — 12032 INTMD RPR S/A/T/EXT 2.6-7.5: CPT | Mod: S$GLB,,, | Performed by: STUDENT IN AN ORGANIZED HEALTH CARE EDUCATION/TRAINING PROGRAM

## 2019-10-31 PROCEDURE — 88305 TISSUE EXAM BY PATHOLOGIST: CPT | Mod: 26,,, | Performed by: PATHOLOGY

## 2019-10-31 PROCEDURE — 99499 NO LOS: ICD-10-PCS | Mod: S$GLB,,, | Performed by: STUDENT IN AN ORGANIZED HEALTH CARE EDUCATION/TRAINING PROGRAM

## 2019-10-31 PROCEDURE — 99499 UNLISTED E&M SERVICE: CPT | Mod: S$GLB,,, | Performed by: STUDENT IN AN ORGANIZED HEALTH CARE EDUCATION/TRAINING PROGRAM

## 2019-10-31 PROCEDURE — 12032 PR LAYR CLOS WND TRUNK,ARM,LEG 2.6-7.5 CM: ICD-10-PCS | Mod: S$GLB,,, | Performed by: STUDENT IN AN ORGANIZED HEALTH CARE EDUCATION/TRAINING PROGRAM

## 2019-10-31 PROCEDURE — 88305 TISSUE EXAM BY PATHOLOGIST: CPT | Performed by: PATHOLOGY

## 2019-10-31 PROCEDURE — 11602 PR EXC SKIN MALIG 1.1-2 CM TRUNK,ARM,LEG: ICD-10-PCS | Mod: 51,S$GLB,, | Performed by: STUDENT IN AN ORGANIZED HEALTH CARE EDUCATION/TRAINING PROGRAM

## 2019-10-31 PROCEDURE — 88305 TISSUE SPECIMEN TO PATHOLOGY, DERMATOLOGY: ICD-10-PCS | Mod: 26,,, | Performed by: PATHOLOGY

## 2019-10-31 PROCEDURE — 11602 EXC TR-EXT MAL+MARG 1.1-2 CM: CPT | Mod: 51,S$GLB,, | Performed by: STUDENT IN AN ORGANIZED HEALTH CARE EDUCATION/TRAINING PROGRAM

## 2019-10-31 NOTE — PROGRESS NOTES
PROCEDURE: Elliptical excision with intermediate layered repair    ANESTHETIC: 5 cc 1% Lidocaine with Epinephrine 1:100,000    SURGICAL PREP: Betadine and EtOH    SURGEON: Juan Kelly MD    ASSISTANTS: Celeste Ravi LPN     PREOPERATIVE DIAGNOSIS: SCC    POSTOPERATIVE DIAGNOSIS: SCC    PATHOLOGIC DIAGNOSIS: Pending    LOCATION: right shoulder    INITIAL LESION SIZE: 1.2 cm    EXCISED DIAMETER: 1.8 cm    PREPARATION:  The diagnosis, procedure, alternatives, benefits and risks, including but not limited to: drug reactions, pain, scar or cosmetic defect, local sensation disturbances, and/or recurrence of present condition were explained to the patient. The patient elected to proceed.    PROCEDURE:  The area of the right shoulder was prepped, draped, and anesthetized in the usual sterile fashion. Lesional tissue was carefully marked prior to administration of the anesthesia. An elliptical excision was drawn around the lesion with margins. A fusiform elliptical excision was done with a #15 blade carried down completely through the dermis into the subcutaneous tissues. Then, with a combination of blunt and sharp dissection, the lesion was removed.  The specimen was marked at the 12 o'clock position with suture and submitted for histologic evaluation. The operative site was widely undermined in the subcutaneous tissue plane. Then, electrocoagulation was used to obtain hemostasis. Blood loss was minimal. The wound was then approximated in a layered fashion with subcutaneous and intradermal 4-0 Vicryl sutures. The wound was then superficially closed with running 4-0 Ethilon sutures.    The patient tolerated the procedure well.    The area was cleaned and dressed appropriately and the patient was given wound care instructions, as well as an appointment for follow-up evaluation.    LENGTH OF REPAIR: 3.5 cm    There were no vitals filed for this visit.    Follow up in about 3 months (around 1/31/2020).

## 2019-11-14 ENCOUNTER — CLINICAL SUPPORT (OUTPATIENT)
Dept: DERMATOLOGY | Facility: CLINIC | Age: 49
End: 2019-11-14
Payer: COMMERCIAL

## 2019-11-14 DIAGNOSIS — Z48.02 VISIT FOR SUTURE REMOVAL: Primary | ICD-10-CM

## 2019-11-14 PROCEDURE — 99024 PR POST-OP FOLLOW-UP VISIT: ICD-10-PCS | Mod: S$GLB,,, | Performed by: STUDENT IN AN ORGANIZED HEALTH CARE EDUCATION/TRAINING PROGRAM

## 2019-11-14 PROCEDURE — 99999 PR PBB SHADOW E&M-EST. PATIENT-LVL III: CPT | Mod: PBBFAC,,,

## 2019-11-14 PROCEDURE — 99024 POSTOP FOLLOW-UP VISIT: CPT | Mod: S$GLB,,, | Performed by: STUDENT IN AN ORGANIZED HEALTH CARE EDUCATION/TRAINING PROGRAM

## 2019-11-14 PROCEDURE — 99999 PR PBB SHADOW E&M-EST. PATIENT-LVL III: ICD-10-PCS | Mod: PBBFAC,,,

## 2019-11-14 NOTE — PROGRESS NOTES
Patient presents for suture removal. The wound is well healed with signs of mild erythema to area.  Pt denies any pain. No s/s of odor or drainage. The sutures are removed. Wound care and activity instructions given. Return prn.

## 2019-12-03 RX ORDER — ARMODAFINIL 250 MG/1
250 TABLET ORAL DAILY
Qty: 90 TABLET | Refills: 2 | Status: SHIPPED | OUTPATIENT
Start: 2019-12-03 | End: 2020-04-14 | Stop reason: SDUPTHER

## 2020-01-03 ENCOUNTER — TELEPHONE (OUTPATIENT)
Dept: FAMILY MEDICINE | Facility: CLINIC | Age: 50
End: 2020-01-03

## 2020-01-03 ENCOUNTER — PATIENT MESSAGE (OUTPATIENT)
Dept: ADMINISTRATIVE | Facility: OTHER | Age: 50
End: 2020-01-03

## 2020-01-06 ENCOUNTER — OFFICE VISIT (OUTPATIENT)
Dept: UROLOGY | Facility: CLINIC | Age: 50
End: 2020-01-06
Payer: COMMERCIAL

## 2020-01-06 ENCOUNTER — LAB VISIT (OUTPATIENT)
Dept: LAB | Facility: HOSPITAL | Age: 50
End: 2020-01-06
Attending: UROLOGY
Payer: COMMERCIAL

## 2020-01-06 VITALS
SYSTOLIC BLOOD PRESSURE: 150 MMHG | WEIGHT: 229.75 LBS | BODY MASS INDEX: 32.04 KG/M2 | DIASTOLIC BLOOD PRESSURE: 90 MMHG

## 2020-01-06 DIAGNOSIS — N48.6 PEYRONIE'S DISEASE: ICD-10-CM

## 2020-01-06 DIAGNOSIS — N52.9 ERECTILE DYSFUNCTION, UNSPECIFIED ERECTILE DYSFUNCTION TYPE: ICD-10-CM

## 2020-01-06 DIAGNOSIS — Z12.5 PROSTATE CANCER SCREENING: ICD-10-CM

## 2020-01-06 DIAGNOSIS — Z12.5 PROSTATE CANCER SCREENING: Primary | ICD-10-CM

## 2020-01-06 DIAGNOSIS — Z13.79 GENETIC SCREENING: ICD-10-CM

## 2020-01-06 PROCEDURE — 99244 OFF/OP CNSLTJ NEW/EST MOD 40: CPT | Mod: S$GLB,,, | Performed by: UROLOGY

## 2020-01-06 PROCEDURE — 99244 PR OFFICE CONSULTATION,LEVEL IV: ICD-10-PCS | Mod: S$GLB,,, | Performed by: UROLOGY

## 2020-01-06 PROCEDURE — 99999 PR PBB SHADOW E&M-EST. PATIENT-LVL III: CPT | Mod: PBBFAC,,, | Performed by: UROLOGY

## 2020-01-06 PROCEDURE — 36415 COLL VENOUS BLD VENIPUNCTURE: CPT

## 2020-01-06 PROCEDURE — 84153 ASSAY OF PSA TOTAL: CPT

## 2020-01-06 PROCEDURE — 99999 PR PBB SHADOW E&M-EST. PATIENT-LVL III: ICD-10-PCS | Mod: PBBFAC,,, | Performed by: UROLOGY

## 2020-01-06 RX ORDER — SILDENAFIL 50 MG/1
50 TABLET, FILM COATED ORAL DAILY PRN
Qty: 30 TABLET | Refills: 11 | Status: SHIPPED | OUTPATIENT
Start: 2020-01-06 | End: 2022-07-21

## 2020-01-06 NOTE — LETTER
January 6, 2020      Delores Lazar MD  [id number] [address] [address]           [facility]  [id number] [place] [id number]  Phone: [phone]  Fax: [phone]          Patient: Wilman Neri   MR Number: [id number]   YOB: 1970   Date of Visit: 1/6/2020       Dear Dr. Delores Lazar:    Thank you for referring Wilman Neri to me for evaluation. Attached you will find relevant portions of my assessment and plan of care.    If you have questions, please do not hesitate to call me. I look forward to following Wilman Neri along with you.    Sincerely,    Graham Torres IV, MD    Enclosure  CC:  No Recipients    If you would like to receive this communication electronically, please contact [email or web address] or [phone] to request more information on [employer] Link access.    For providers and/or their staff who would like to refer a patient to [facility], please contact us through our one-stop-shop provider referral line, [facility] , at [phone].    If you feel you have received this communication in error or would no longer like to receive these types of communications, please e-mail [email or web address]

## 2020-01-06 NOTE — PROGRESS NOTES
Chief Complaint: Penile Complaint    HPI:   1/6/20: 48 yo man referred by Dr. Lazar for what sounds like a penile plaque left mid penis and some ED.  Cialis/viagra tried but was expensive.   No abd/pelvic pain and no exac/rel factors.  No hematuria.  No recent urolithiasis but did pass a stone 20 years ag.  No urinary bother.  No other  history.    Allergies:  Bee pollens    Medications:  has a current medication list which includes the following prescription(s): armodafinil, atorvastatin, co-enzyme q-10, eszopiclone, lisinopril-hydrochlorothiazide, melatonin, metformin, paroxetine, propranolol, synthroid, and diclofenac sodium.    Review of Systems:  General: No fever, chills, fatigability, or weight loss.  Skin: No rashes, itching, or changes in color or texture of skin.  Chest: Denies ARCE, cyanosis, wheezing, cough, and sputum production.  Abdomen: Appetite fine. No weight loss. Denies diarrhea, abdominal pain, hematemesis, or blood in stool.  Musculoskeletal: No joint stiffness or swelling. Denies back pain.  : As above.  All other review of systems negative.    PMH:   has a past medical history of Anxiety, Depression, Diabetes mellitus, Hypertension, Hypothyroid, Sleep disorder, Squamous cell carcinoma of skin (10/2019), and Stroke.    PSH:   has a past surgical history that includes Modified radical mastectomy w/ axillary lymph node dissection; External ear surgery; Back surgery; and Colonoscopy (N/A, 3/7/2019).    FamHx: family history includes Cancer in his father and mother; Cataracts in his father and mother; Diabetes in his maternal grandmother; Macular degeneration in his father.    SocHx:  reports that he has quit smoking. His smoking use included vaping w/o nicotine. He smoked 1.00 pack per day. He has never used smokeless tobacco. He reports that he does not drink alcohol or use drugs.      Physical Exam:  Vitals:    01/06/20 0910   BP: (!) 150/90     General: A&Ox3, no apparent distress, no  deformities  Neck: No masses, normal thyroid  Lungs: normal inspiration, no use of accessory muscles  Heart: normal pulse, no arrhythmias  Abdomen: Soft, NT, ND, no masses, no hernias, no hepatosplenomegaly  Lymphatic: Neck and groin nodes negative  Skin: The skin is warm and dry. No jaundice.  Ext: No c/c/e.  : Test desc brain, no abnormalities of epididymus. Penis normal with midline dorsal plaque, with normal penile and scrotal skin. Meatus normal. Normal rectal tone, no hemorrhoids. Prost 30 gm no nodules or masses appreciated. SV not palpable. Perineum and anus normal.    Labs/Studies:   Urinalysis performed in clinic, summary: UA normal exc 250 gluc  PSA    1/19: 0.22    Impression/Plan:   1. PSA today for prostate cancer screening   2. Discussed modalities for Peyronies  3. Sildenafil for ED

## 2020-01-07 ENCOUNTER — PATIENT MESSAGE (OUTPATIENT)
Dept: UROLOGY | Facility: CLINIC | Age: 50
End: 2020-01-07

## 2020-01-07 LAB — COMPLEXED PSA SERPL-MCNC: 0.25 NG/ML (ref 0–4)

## 2020-01-08 RX ORDER — ESZOPICLONE 2 MG/1
2 TABLET, FILM COATED ORAL NIGHTLY
Qty: 90 TABLET | Refills: 1 | Status: SHIPPED | OUTPATIENT
Start: 2020-01-08 | End: 2020-04-14 | Stop reason: SDUPTHER

## 2020-01-29 ENCOUNTER — PATIENT MESSAGE (OUTPATIENT)
Dept: FAMILY MEDICINE | Facility: CLINIC | Age: 50
End: 2020-01-29

## 2020-02-07 ENCOUNTER — PATIENT OUTREACH (OUTPATIENT)
Dept: ADMINISTRATIVE | Facility: HOSPITAL | Age: 50
End: 2020-02-07

## 2020-02-07 NOTE — PROGRESS NOTES
LIPID PANEL OUTREACH: Attempting to contact pt to schedule over due HM & F/U. Unable to reach patient at this time. Left voicemail.

## 2020-02-14 ENCOUNTER — PATIENT OUTREACH (OUTPATIENT)
Dept: ADMINISTRATIVE | Facility: HOSPITAL | Age: 50
End: 2020-02-14

## 2020-02-14 RX ORDER — PROPRANOLOL HYDROCHLORIDE 80 MG/1
CAPSULE, EXTENDED RELEASE ORAL
Qty: 30 CAPSULE | Refills: 11 | Status: SHIPPED | OUTPATIENT
Start: 2020-02-14 | End: 2020-04-14 | Stop reason: SDUPTHER

## 2020-02-21 DIAGNOSIS — E11.9 TYPE 2 DIABETES MELLITUS WITHOUT COMPLICATION: ICD-10-CM

## 2020-03-03 ENCOUNTER — PATIENT OUTREACH (OUTPATIENT)
Dept: ADMINISTRATIVE | Facility: OTHER | Age: 50
End: 2020-03-03

## 2020-03-15 RX ORDER — PAROXETINE HYDROCHLORIDE 20 MG/1
TABLET, FILM COATED ORAL
Qty: 30 TABLET | Refills: 11 | Status: SHIPPED | OUTPATIENT
Start: 2020-03-15 | End: 2020-03-17 | Stop reason: SDUPTHER

## 2020-03-18 RX ORDER — PAROXETINE HYDROCHLORIDE 20 MG/1
20 TABLET, FILM COATED ORAL EVERY MORNING
Qty: 90 TABLET | Refills: 0 | Status: SHIPPED | OUTPATIENT
Start: 2020-03-18 | End: 2020-04-14 | Stop reason: SDUPTHER

## 2020-04-14 ENCOUNTER — OFFICE VISIT (OUTPATIENT)
Dept: FAMILY MEDICINE | Facility: CLINIC | Age: 50
End: 2020-04-14
Payer: COMMERCIAL

## 2020-04-14 ENCOUNTER — PATIENT MESSAGE (OUTPATIENT)
Dept: FAMILY MEDICINE | Facility: CLINIC | Age: 50
End: 2020-04-14

## 2020-04-14 DIAGNOSIS — G47.00 INSOMNIA, UNSPECIFIED TYPE: ICD-10-CM

## 2020-04-14 DIAGNOSIS — I10 ESSENTIAL HYPERTENSION: Primary | ICD-10-CM

## 2020-04-14 DIAGNOSIS — E03.4 HYPOTHYROIDISM DUE TO ACQUIRED ATROPHY OF THYROID: ICD-10-CM

## 2020-04-14 DIAGNOSIS — F32.A DEPRESSION, UNSPECIFIED DEPRESSION TYPE: ICD-10-CM

## 2020-04-14 PROCEDURE — 99214 PR OFFICE/OUTPT VISIT, EST, LEVL IV, 30-39 MIN: ICD-10-PCS | Mod: 95,,, | Performed by: FAMILY MEDICINE

## 2020-04-14 PROCEDURE — 99214 OFFICE O/P EST MOD 30 MIN: CPT | Mod: 95,,, | Performed by: FAMILY MEDICINE

## 2020-04-14 RX ORDER — LISINOPRIL AND HYDROCHLOROTHIAZIDE 12.5; 2 MG/1; MG/1
1 TABLET ORAL DAILY
Qty: 10 TABLET | Refills: 0 | Status: SHIPPED | OUTPATIENT
Start: 2020-04-14 | End: 2020-04-14 | Stop reason: SDUPTHER

## 2020-04-14 RX ORDER — PAROXETINE HYDROCHLORIDE 20 MG/1
20 TABLET, FILM COATED ORAL EVERY MORNING
Qty: 90 TABLET | Refills: 0 | Status: SHIPPED | OUTPATIENT
Start: 2020-04-14 | End: 2020-09-29 | Stop reason: SDUPTHER

## 2020-04-14 RX ORDER — LISINOPRIL AND HYDROCHLOROTHIAZIDE 12.5; 2 MG/1; MG/1
1 TABLET ORAL DAILY
Qty: 90 TABLET | Refills: 1 | Status: SHIPPED | OUTPATIENT
Start: 2020-04-14 | End: 2020-09-29 | Stop reason: SDUPTHER

## 2020-04-14 RX ORDER — METFORMIN HYDROCHLORIDE 500 MG/1
500 TABLET ORAL 2 TIMES DAILY WITH MEALS
Qty: 180 TABLET | Refills: 1 | Status: SHIPPED | OUTPATIENT
Start: 2020-04-14 | End: 2020-09-29 | Stop reason: SDUPTHER

## 2020-04-14 RX ORDER — LISINOPRIL AND HYDROCHLOROTHIAZIDE 12.5; 2 MG/1; MG/1
TABLET ORAL
Qty: 90 TABLET | Refills: 2 | OUTPATIENT
Start: 2020-04-14

## 2020-04-14 RX ORDER — ATORVASTATIN CALCIUM 20 MG/1
20 TABLET, FILM COATED ORAL DAILY
Qty: 90 TABLET | Refills: 3 | Status: SHIPPED | OUTPATIENT
Start: 2020-04-14 | End: 2020-09-29 | Stop reason: SDUPTHER

## 2020-04-14 RX ORDER — ESZOPICLONE 2 MG/1
2 TABLET, FILM COATED ORAL NIGHTLY
Qty: 90 TABLET | Refills: 1 | Status: SHIPPED | OUTPATIENT
Start: 2020-04-14 | End: 2020-09-29 | Stop reason: SDUPTHER

## 2020-04-14 RX ORDER — LEVOTHYROXINE SODIUM 200 UG/1
200 TABLET ORAL DAILY
Qty: 90 TABLET | Refills: 2 | Status: SHIPPED | OUTPATIENT
Start: 2020-04-14 | End: 2020-09-29 | Stop reason: SDUPTHER

## 2020-04-14 RX ORDER — BUTALBITAL, ACETAMINOPHEN AND CAFFEINE 50; 325; 40 MG/1; MG/1; MG/1
1 TABLET ORAL EVERY 4 HOURS PRN
Qty: 30 TABLET | Refills: 0 | Status: SHIPPED | OUTPATIENT
Start: 2020-04-14 | End: 2020-05-14

## 2020-04-14 RX ORDER — METFORMIN HYDROCHLORIDE 500 MG/1
500 TABLET ORAL 2 TIMES DAILY WITH MEALS
Qty: 20 TABLET | Refills: 0 | Status: SHIPPED | OUTPATIENT
Start: 2020-04-14 | End: 2020-04-14 | Stop reason: SDUPTHER

## 2020-04-14 RX ORDER — ARMODAFINIL 250 MG/1
250 TABLET ORAL DAILY
Qty: 90 TABLET | Refills: 1 | Status: SHIPPED | OUTPATIENT
Start: 2020-04-14 | End: 2020-09-29 | Stop reason: SDUPTHER

## 2020-04-14 RX ORDER — METFORMIN HYDROCHLORIDE 500 MG/1
TABLET ORAL
Qty: 180 TABLET | Refills: 2 | OUTPATIENT
Start: 2020-04-14

## 2020-04-14 RX ORDER — PROPRANOLOL HYDROCHLORIDE 80 MG/1
80 CAPSULE, EXTENDED RELEASE ORAL DAILY
Qty: 90 CAPSULE | Refills: 3 | Status: SHIPPED | OUTPATIENT
Start: 2020-04-14 | End: 2020-09-29 | Stop reason: SDUPTHER

## 2020-04-14 NOTE — PROGRESS NOTES
Chief Complaint:    No chief complaint on file.      History of Present Illness:    This is a virtual visit  Doing okay he has diabetes type 2 he has managing it well with diet he has gained some weight because he was on a low carb diet  Blood pressure in he says at home is okay  Takes Lunesta for insomnia  He has shift work disorder where well controlled with current medication      ROS:  Review of Systems   Constitutional: Negative for activity change, chills, fatigue, fever and unexpected weight change.   HENT: Negative for congestion, ear discharge, ear pain, hearing loss, postnasal drip and rhinorrhea.    Eyes: Negative for pain and visual disturbance.   Respiratory: Negative for cough, chest tightness and shortness of breath.    Cardiovascular: Negative for chest pain and palpitations.   Gastrointestinal: Negative for abdominal pain, diarrhea and vomiting.   Endocrine: Negative for heat intolerance.   Genitourinary: Negative for dysuria, flank pain, frequency and hematuria.   Musculoskeletal: Negative for back pain, gait problem and neck pain.   Skin: Negative for color change and rash.   Neurological: Negative for dizziness, tremors, seizures, numbness and headaches.   Psychiatric/Behavioral: Negative for agitation, hallucinations, self-injury, sleep disturbance and suicidal ideas. The patient is not nervous/anxious.        Past Medical History:   Diagnosis Date    Anxiety     Depression     Diabetes mellitus     Hypertension     Hypothyroid     Sleep disorder     Squamous cell carcinoma of skin 10/2019    right shoulder    Stroke        Social History:  Social History     Socioeconomic History    Marital status:      Spouse name: Not on file    Number of children: Not on file    Years of education: Not on file    Highest education level: Not on file   Occupational History    Not on file   Social Needs    Financial resource strain: Not on file    Food insecurity:     Worry: Not on file      Inability: Not on file    Transportation needs:     Medical: Not on file     Non-medical: Not on file   Tobacco Use    Smoking status: Former Smoker     Packs/day: 1.00     Types: Vaping w/o nicotine    Smokeless tobacco: Never Used    Tobacco comment: vape    Substance and Sexual Activity    Alcohol use: No     Comment: couple times a year    Drug use: No    Sexual activity: Yes     Partners: Female     Birth control/protection: None   Lifestyle    Physical activity:     Days per week: Not on file     Minutes per session: Not on file    Stress: Not on file   Relationships    Social connections:     Talks on phone: Not on file     Gets together: Not on file     Attends Confucianist service: Not on file     Active member of club or organization: Not on file     Attends meetings of clubs or organizations: Not on file     Relationship status: Not on file   Other Topics Concern    Not on file   Social History Narrative    Not on file       Family History:   family history includes Cancer in his father and mother; Cataracts in his father and mother; Diabetes in his maternal grandmother; Macular degeneration in his father.    Health Maintenance   Topic Date Due    Pneumococcal Vaccine (Medium Risk) (1 of 1 - PPSV23) 04/28/1987    Foot Exam  01/29/2020    Hemoglobin A1c  01/30/2020    Eye Exam  03/01/2020    Lipid Panel  07/30/2020    Low Dose Statin  01/06/2021    TETANUS VACCINE  05/14/2025    Colonoscopy  03/07/2029       Physical Exam:     ]    There is no height or weight on file to calculate BMI.    Physical Exam      Diabetes Management Status    Statin: Taking  ACE/ARB: Taking    Screening or Prevention Patient's value Goal Complete/Controlled?   HgA1C Testing and Control   Lab Results   Component Value Date    HGBA1C 5.9 (H) 07/30/2019      Annually/Less than 8% Yes   Lipid profile : 07/30/2019 Annually Yes   LDL control Lab Results   Component Value Date    LDLCALC 47.2 (L) 07/30/2019     Annually/Less than 100 mg/dl  Yes   Nephropathy screening No results found for: LABMICR  Lab Results   Component Value Date    PROTEINUA NEG 10/24/2006    Annually No   Blood pressure BP Readings from Last 1 Encounters:   01/06/20 (!) 150/90    Less than 140/90 Yes   Dilated retinal exam : 03/01/2019 Annually No   Foot exam   : 01/29/2019 Annually Yes       Assessment:      ICD-10-CM ICD-9-CM   1. Essential hypertension I10 401.9   2. Insomnia, unspecified type G47.00 780.52   3. Hypothyroidism due to acquired atrophy of thyroid E03.4 244.8     246.8   4. Depression, unspecified depression type F32.9 311         Plan:  Doing well on current medication  Continue current meds and plan  Recommend checking labs    The patient location is: Home   The chief complaint leading to consultation is: as below  Visit type: Virtual visit with synchronous audio and video  Total time spent with patient: 20+ mins  Each patient to whom he or she provides medical services by telemedicine is:  (1) informed of the relationship between the physician and patient and the respective role of any other health care provider with respect to management of the patient; and (2) notified that he or she may decline to receive medical services by telemedicine and may withdraw from such care at any time.    Notes: see below   Follow-up 6 months  No orders of the defined types were placed in this encounter.      Current Outpatient Medications   Medication Sig Dispense Refill    armodafiniL (NUVIGIL) 250 mg tablet Take 1 tablet (250 mg total) by mouth once daily. 90 tablet 1    atorvastatin (LIPITOR) 20 MG tablet Take 1 tablet (20 mg total) by mouth once daily. 90 tablet 3    butalbital-acetaminophen-caffeine -40 mg (ESGIC) -40 mg per tablet Take 1 tablet by mouth every 4 (four) hours as needed for Headaches. 30 tablet 0    co-enzyme Q-10 30 mg capsule Take 30 mg by mouth 3 (three) times daily.      diclofenac sodium 1 % Gel Apply 2 g  topically 4 (four) times daily. 1 Tube 2    eszopiclone (LUNESTA) 2 MG Tab Take 1 tablet (2 mg total) by mouth every evening. 90 tablet 1    levothyroxine (SYNTHROID) 200 MCG tablet Take 1 tablet (200 mcg total) by mouth once daily. 90 tablet 2    lisinopriL-hydrochlorothiazide (PRINZIDE,ZESTORETIC) 20-12.5 mg per tablet Take 1 tablet by mouth once daily. 90 tablet 1    MELATONIN ORAL Take by mouth. Patient states that he takes 5 mg daily      metFORMIN (GLUCOPHAGE) 500 MG tablet Take 1 tablet (500 mg total) by mouth 2 (two) times daily with meals. 180 tablet 1    paroxetine (PAXIL) 20 MG tablet Take 1 tablet (20 mg total) by mouth every morning. 90 tablet 0    propranoloL (INDERAL LA) 80 MG 24 hr capsule Take 1 capsule (80 mg total) by mouth once daily. 90 capsule 3    sildenafil (VIAGRA) 50 MG tablet Take 1 tablet (50 mg total) by mouth daily as needed for Erectile Dysfunction. 30 tablet 11     No current facility-administered medications for this visit.        Medications Discontinued During This Encounter   Medication Reason    SYNTHROID 200 mcg tablet Reorder    propranolol (INDERAL LA) 80 MG 24 hr capsule Reorder    paroxetine (PAXIL) 20 MG tablet Reorder    metFORMIN (GLUCOPHAGE) 500 MG tablet Reorder    lisinopriL-hydrochlorothiazide (PRINZIDE,ZESTORETIC) 20-12.5 mg per tablet Reorder    eszopiclone (LUNESTA) 2 MG Tab Reorder    atorvastatin (LIPITOR) 20 MG tablet Reorder    armodafinil (NUVIGIL) 250 mg tablet Reorder       No follow-ups on file.      Delores Lazar MD

## 2020-08-07 DIAGNOSIS — E11.9 TYPE 2 DIABETES MELLITUS WITHOUT COMPLICATION: ICD-10-CM

## 2020-09-09 ENCOUNTER — PATIENT OUTREACH (OUTPATIENT)
Dept: ADMINISTRATIVE | Facility: HOSPITAL | Age: 50
End: 2020-09-09

## 2020-09-24 ENCOUNTER — PATIENT OUTREACH (OUTPATIENT)
Dept: ADMINISTRATIVE | Facility: HOSPITAL | Age: 50
End: 2020-09-24

## 2020-09-24 NOTE — PROGRESS NOTES
BP Report. Need updated reading.    Advised due for annual exam, follow up hypertension and diabetes. appt scheduled.  Said he lost his insurance and so now is on medicaid. Told him I would need to check on this in am to see if appt could be kept.

## 2020-09-29 ENCOUNTER — LAB VISIT (OUTPATIENT)
Dept: LAB | Facility: HOSPITAL | Age: 50
End: 2020-09-29
Payer: MEDICAID

## 2020-09-29 ENCOUNTER — PATIENT MESSAGE (OUTPATIENT)
Dept: FAMILY MEDICINE | Facility: CLINIC | Age: 50
End: 2020-09-29

## 2020-09-29 ENCOUNTER — OFFICE VISIT (OUTPATIENT)
Dept: FAMILY MEDICINE | Facility: CLINIC | Age: 50
End: 2020-09-29
Payer: MEDICAID

## 2020-09-29 VITALS
SYSTOLIC BLOOD PRESSURE: 156 MMHG | DIASTOLIC BLOOD PRESSURE: 92 MMHG | HEIGHT: 71 IN | BODY MASS INDEX: 32.45 KG/M2 | TEMPERATURE: 98 F | WEIGHT: 231.81 LBS

## 2020-09-29 DIAGNOSIS — Z00.00 WELL ADULT EXAM: ICD-10-CM

## 2020-09-29 DIAGNOSIS — R73.03 PREDIABETES: ICD-10-CM

## 2020-09-29 DIAGNOSIS — G47.26 CIRCADIAN RHYTHM SLEEP DISORDER, SHIFT WORK TYPE: ICD-10-CM

## 2020-09-29 DIAGNOSIS — G47.00 INSOMNIA, UNSPECIFIED TYPE: ICD-10-CM

## 2020-09-29 DIAGNOSIS — Z78.9 DRINKS BEER: ICD-10-CM

## 2020-09-29 DIAGNOSIS — E03.4 HYPOTHYROIDISM DUE TO ACQUIRED ATROPHY OF THYROID: ICD-10-CM

## 2020-09-29 DIAGNOSIS — Z00.00 WELL ADULT EXAM: Primary | ICD-10-CM

## 2020-09-29 DIAGNOSIS — I10 ESSENTIAL HYPERTENSION: ICD-10-CM

## 2020-09-29 LAB
BASOPHILS # BLD AUTO: 0.08 K/UL (ref 0–0.2)
BASOPHILS NFR BLD: 1 % (ref 0–1.9)
DIFFERENTIAL METHOD: NORMAL
EOSINOPHIL # BLD AUTO: 0.3 K/UL (ref 0–0.5)
EOSINOPHIL NFR BLD: 3.5 % (ref 0–8)
ERYTHROCYTE [DISTWIDTH] IN BLOOD BY AUTOMATED COUNT: 13.3 % (ref 11.5–14.5)
HCT VFR BLD AUTO: 48.4 % (ref 40–54)
HGB BLD-MCNC: 15.8 G/DL (ref 14–18)
IMM GRANULOCYTES # BLD AUTO: 0.03 K/UL (ref 0–0.04)
IMM GRANULOCYTES NFR BLD AUTO: 0.4 % (ref 0–0.5)
LYMPHOCYTES # BLD AUTO: 2.6 K/UL (ref 1–4.8)
LYMPHOCYTES NFR BLD: 30.6 % (ref 18–48)
MCH RBC QN AUTO: 29.7 PG (ref 27–31)
MCHC RBC AUTO-ENTMCNC: 32.6 G/DL (ref 32–36)
MCV RBC AUTO: 91 FL (ref 82–98)
MONOCYTES # BLD AUTO: 0.7 K/UL (ref 0.3–1)
MONOCYTES NFR BLD: 8.3 % (ref 4–15)
NEUTROPHILS # BLD AUTO: 4.7 K/UL (ref 1.8–7.7)
NEUTROPHILS NFR BLD: 56.2 % (ref 38–73)
NRBC BLD-RTO: 0 /100 WBC
PLATELET # BLD AUTO: 300 K/UL (ref 150–350)
PMV BLD AUTO: 9.5 FL (ref 9.2–12.9)
RBC # BLD AUTO: 5.32 M/UL (ref 4.6–6.2)
WBC # BLD AUTO: 8.36 K/UL (ref 3.9–12.7)

## 2020-09-29 PROCEDURE — 99999 PR PBB SHADOW E&M-EST. PATIENT-LVL III: CPT | Mod: PBBFAC,,, | Performed by: FAMILY MEDICINE

## 2020-09-29 PROCEDURE — 99999 PR PBB SHADOW E&M-EST. PATIENT-LVL III: ICD-10-PCS | Mod: PBBFAC,,, | Performed by: FAMILY MEDICINE

## 2020-09-29 PROCEDURE — 86803 HEPATITIS C AB TEST: CPT

## 2020-09-29 PROCEDURE — 99396 PR PREVENTIVE VISIT,EST,40-64: ICD-10-PCS | Mod: S$PBB,,, | Performed by: FAMILY MEDICINE

## 2020-09-29 PROCEDURE — 84439 ASSAY OF FREE THYROXINE: CPT

## 2020-09-29 PROCEDURE — 83036 HEMOGLOBIN GLYCOSYLATED A1C: CPT

## 2020-09-29 PROCEDURE — 80061 LIPID PANEL: CPT

## 2020-09-29 PROCEDURE — 86703 HIV-1/HIV-2 1 RESULT ANTBDY: CPT

## 2020-09-29 PROCEDURE — 84443 ASSAY THYROID STIM HORMONE: CPT

## 2020-09-29 PROCEDURE — 36415 COLL VENOUS BLD VENIPUNCTURE: CPT | Mod: PO

## 2020-09-29 PROCEDURE — 85025 COMPLETE CBC W/AUTO DIFF WBC: CPT

## 2020-09-29 PROCEDURE — 99213 OFFICE O/P EST LOW 20 MIN: CPT | Mod: PBBFAC,PO | Performed by: FAMILY MEDICINE

## 2020-09-29 PROCEDURE — 80053 COMPREHEN METABOLIC PANEL: CPT

## 2020-09-29 PROCEDURE — 99396 PREV VISIT EST AGE 40-64: CPT | Mod: S$PBB,,, | Performed by: FAMILY MEDICINE

## 2020-09-29 RX ORDER — LEVOTHYROXINE SODIUM 200 UG/1
200 TABLET ORAL DAILY
Qty: 90 TABLET | Refills: 2 | Status: SHIPPED | OUTPATIENT
Start: 2020-09-29 | End: 2021-07-21 | Stop reason: SDUPTHER

## 2020-09-29 RX ORDER — METFORMIN HYDROCHLORIDE 500 MG/1
500 TABLET ORAL 2 TIMES DAILY WITH MEALS
Qty: 180 TABLET | Refills: 2 | Status: SHIPPED | OUTPATIENT
Start: 2020-09-29 | End: 2020-10-05

## 2020-09-29 RX ORDER — ATORVASTATIN CALCIUM 20 MG/1
20 TABLET, FILM COATED ORAL DAILY
Qty: 90 TABLET | Refills: 2 | Status: SHIPPED | OUTPATIENT
Start: 2020-09-29 | End: 2021-06-23

## 2020-09-29 RX ORDER — PAROXETINE HYDROCHLORIDE 20 MG/1
20 TABLET, FILM COATED ORAL EVERY MORNING
Qty: 90 TABLET | Refills: 2 | Status: SHIPPED | OUTPATIENT
Start: 2020-09-29 | End: 2021-06-28

## 2020-09-29 RX ORDER — ESZOPICLONE 2 MG/1
2 TABLET, FILM COATED ORAL NIGHTLY
Qty: 90 TABLET | Refills: 1 | Status: SHIPPED | OUTPATIENT
Start: 2020-09-29 | End: 2020-10-06 | Stop reason: SDUPTHER

## 2020-09-29 RX ORDER — LISINOPRIL AND HYDROCHLOROTHIAZIDE 12.5; 2 MG/1; MG/1
1 TABLET ORAL DAILY
Qty: 90 TABLET | Refills: 2 | Status: SHIPPED | OUTPATIENT
Start: 2020-09-29 | End: 2021-09-26

## 2020-09-29 RX ORDER — PROPRANOLOL HYDROCHLORIDE 80 MG/1
80 CAPSULE, EXTENDED RELEASE ORAL DAILY
Qty: 90 CAPSULE | Refills: 2 | Status: SHIPPED | OUTPATIENT
Start: 2020-09-29 | End: 2020-10-05

## 2020-09-29 RX ORDER — ARMODAFINIL 250 MG/1
250 TABLET ORAL DAILY
Qty: 90 TABLET | Refills: 1 | Status: SHIPPED | OUTPATIENT
Start: 2020-09-29 | End: 2020-10-22

## 2020-09-29 NOTE — PROGRESS NOTES
Chief Complaint:    Chief Complaint   Patient presents with    Annual Exam       History of Present Illness:    Presents today for six-month follow-up:  Doing okay he has diabetes type 2 he has managing it well with diet he has gained some weight because he was on a low carb diet he has gained some weight  He has been drinking some beer lately blood pressure is elevated he says his home blood pressure readings are okay.  Blood pressure in he says at home is okay  Takes Lunesta for insomnia  He has shift work disorder where well controlled with current medication  Currently been laid off from his job but is handling it okay.      ROS:  Review of Systems   Constitutional: Negative for activity change, chills, fatigue, fever and unexpected weight change.   HENT: Negative for congestion, ear discharge, ear pain, hearing loss, postnasal drip and rhinorrhea.    Eyes: Negative for pain and visual disturbance.   Respiratory: Negative for cough, chest tightness and shortness of breath.    Cardiovascular: Negative for chest pain and palpitations.   Gastrointestinal: Negative for abdominal pain, diarrhea and vomiting.   Endocrine: Negative for heat intolerance.   Genitourinary: Negative for dysuria, flank pain, frequency and hematuria.   Musculoskeletal: Negative for back pain, gait problem and neck pain.   Skin: Negative for color change and rash.   Neurological: Negative for dizziness, tremors, seizures, numbness and headaches.   Psychiatric/Behavioral: Negative for agitation, hallucinations, self-injury, sleep disturbance and suicidal ideas. The patient is not nervous/anxious.        Past Medical History:   Diagnosis Date    Anxiety     Depression     Diabetes mellitus     Hypertension     Hypothyroid     Sleep disorder     Squamous cell carcinoma of skin 10/2019    right shoulder    Stroke        Social History:  Social History     Socioeconomic History    Marital status:      Spouse name: Not on file     "Number of children: Not on file    Years of education: Not on file    Highest education level: Not on file   Occupational History    Not on file   Social Needs    Financial resource strain: Not on file    Food insecurity     Worry: Not on file     Inability: Not on file    Transportation needs     Medical: Not on file     Non-medical: Not on file   Tobacco Use    Smoking status: Former Smoker     Packs/day: 1.00     Types: Vaping w/o nicotine    Smokeless tobacco: Never Used    Tobacco comment: vape    Substance and Sexual Activity    Alcohol use: No     Comment: couple times a year    Drug use: No    Sexual activity: Yes     Partners: Female     Birth control/protection: None   Lifestyle    Physical activity     Days per week: Not on file     Minutes per session: Not on file    Stress: Not on file   Relationships    Social connections     Talks on phone: Not on file     Gets together: Not on file     Attends Orthodox service: Not on file     Active member of club or organization: Not on file     Attends meetings of clubs or organizations: Not on file     Relationship status: Not on file   Other Topics Concern    Not on file   Social History Narrative    Not on file       Family History:   family history includes Cancer in his father and mother; Cataracts in his father and mother; Diabetes in his maternal grandmother; Macular degeneration in his father.    Health Maintenance   Topic Date Due    Hepatitis C Screening  1970    Pneumococcal Vaccine (Medium Risk) (1 of 1 - PPSV23) 04/28/1987    Foot Exam  01/29/2020    Hemoglobin A1c  01/30/2020    Eye Exam  03/01/2020    Lipid Panel  07/30/2020    Low Dose Statin  09/29/2021    TETANUS VACCINE  05/14/2025       Physical Exam:    Vital Signs  Temp: 97.9 °F (36.6 °C)  BP: (!) 156/92  Pain Score: 0-No pain  Height and Weight  Height: 5' 11" (180.3 cm)  Weight: 105.2 kg (231 lb 13 oz)  BSA (Calculated - sq m): 2.29 sq meters  BMI " (Calculated): 32.3  Weight in (lb) to have BMI = 25: 178.9]    Body mass index is 32.33 kg/m².    Physical Exam  Constitutional:       Appearance: He is well-developed.   Eyes:      Conjunctiva/sclera: Conjunctivae normal.      Pupils: Pupils are equal, round, and reactive to light.   Neck:      Musculoskeletal: Normal range of motion and neck supple.   Cardiovascular:      Rate and Rhythm: Normal rate and regular rhythm.      Pulses:           Dorsalis pedis pulses are 1+ on the right side and 0 on the left side.        Posterior tibial pulses are 1+ on the right side and 0 on the left side.      Heart sounds: Normal heart sounds. No murmur.   Pulmonary:      Effort: Pulmonary effort is normal. No respiratory distress.      Breath sounds: Normal breath sounds. No wheezing or rales.   Chest:      Chest wall: No tenderness.   Abdominal:      General: There is no distension.      Palpations: Abdomen is soft. There is no mass.      Tenderness: There is no abdominal tenderness. There is no guarding.   Musculoskeletal:         General: No tenderness.        Feet:    Feet:      Right foot:      Protective Sensation: 10 sites tested. 6 sites sensed.      Left foot:      Protective Sensation: 10 sites tested. 6 sites sensed.   Lymphadenopathy:      Cervical: No cervical adenopathy.   Skin:     General: Skin is warm and dry.   Neurological:      Mental Status: He is alert and oriented to person, place, and time.      Deep Tendon Reflexes: Reflexes are normal and symmetric.   Psychiatric:         Behavior: Behavior normal.         Thought Content: Thought content normal.         Judgment: Judgment normal.           Diabetes Management Status    Statin: Taking  ACE/ARB: Taking    Screening or Prevention Patient's value Goal Complete/Controlled?   HgA1C Testing and Control   Lab Results   Component Value Date    HGBA1C 5.9 (H) 07/30/2019      Annually/Less than 8% Yes   Lipid profile : 07/30/2019 Annually Yes   LDL control Lab  Results   Component Value Date    LDLCALC 47.2 (L) 07/30/2019    Annually/Less than 100 mg/dl  Yes   Nephropathy screening No results found for: LABMICR  Lab Results   Component Value Date    PROTEINUA NEG 10/24/2006    Annually No   Blood pressure BP Readings from Last 1 Encounters:   09/29/20 (!) 156/92    Less than 140/90 Yes   Dilated retinal exam : 03/01/2019 Annually No   Foot exam   : 01/29/2019 Annually Yes       Assessment:      ICD-10-CM ICD-9-CM   1. Well adult exam  Z00.00 V70.0   2. Essential hypertension  I10 401.9   3. Hypothyroidism due to acquired atrophy of thyroid  E03.4 244.8     246.8   4. Insomnia, unspecified type  G47.00 780.52   5. Circadian rhythm sleep disorder, shift work type  G47.26 327.36   6. Drinks beer  Z78.9 V49.89   7. Prediabetes  R73.03 790.29         Plan:    Please start monitoring blood pressure closely twice a day and bring the numbers the next couple of weeks  Work on weight loss  Please work on stopping the beer  Check labs as below    Orders Placed This Encounter   Procedures    CBC auto differential    Comprehensive metabolic panel    Lipid Panel    Hemoglobin A1C    TSH    HIV 1/2 Ag/Ab (4th Gen)    Hepatitis C Antibody       Current Outpatient Medications   Medication Sig Dispense Refill    armodafiniL (NUVIGIL) 250 mg tablet Take 1 tablet (250 mg total) by mouth once daily. 90 tablet 1    atorvastatin (LIPITOR) 20 MG tablet Take 1 tablet (20 mg total) by mouth once daily. 90 tablet 3    co-enzyme Q-10 30 mg capsule Take 30 mg by mouth 3 (three) times daily.      eszopiclone (LUNESTA) 2 MG Tab Take 1 tablet (2 mg total) by mouth every evening. 90 tablet 1    levothyroxine (SYNTHROID) 200 MCG tablet Take 1 tablet (200 mcg total) by mouth once daily. 90 tablet 2    lisinopriL-hydrochlorothiazide (PRINZIDE,ZESTORETIC) 20-12.5 mg per tablet Take 1 tablet by mouth once daily. 90 tablet 1    MELATONIN ORAL Take by mouth. Patient states that he takes 5 mg daily       metFORMIN (GLUCOPHAGE) 500 MG tablet Take 1 tablet (500 mg total) by mouth 2 (two) times daily with meals. 180 tablet 1    paroxetine (PAXIL) 20 MG tablet Take 1 tablet (20 mg total) by mouth every morning. 90 tablet 0    propranoloL (INDERAL LA) 80 MG 24 hr capsule Take 1 capsule (80 mg total) by mouth once daily. 90 capsule 3    sildenafil (VIAGRA) 50 MG tablet Take 1 tablet (50 mg total) by mouth daily as needed for Erectile Dysfunction. 30 tablet 11    diclofenac sodium 1 % Gel Apply 2 g topically 4 (four) times daily. 1 Tube 2     No current facility-administered medications for this visit.        There are no discontinued medications.    Follow up in about 6 months (around 3/29/2021).      Delores Lazar MD

## 2020-09-30 LAB
ALBUMIN SERPL BCP-MCNC: 3.9 G/DL (ref 3.5–5.2)
ALP SERPL-CCNC: 55 U/L (ref 55–135)
ALT SERPL W/O P-5'-P-CCNC: 76 U/L (ref 10–44)
ANION GAP SERPL CALC-SCNC: 14 MMOL/L (ref 8–16)
AST SERPL-CCNC: 45 U/L (ref 10–40)
BILIRUB SERPL-MCNC: 0.5 MG/DL (ref 0.1–1)
BUN SERPL-MCNC: 8 MG/DL (ref 6–20)
CALCIUM SERPL-MCNC: 9.6 MG/DL (ref 8.7–10.5)
CHLORIDE SERPL-SCNC: 103 MMOL/L (ref 95–110)
CHOLEST SERPL-MCNC: 213 MG/DL (ref 120–199)
CHOLEST/HDLC SERPL: 5.3 {RATIO} (ref 2–5)
CO2 SERPL-SCNC: 24 MMOL/L (ref 23–29)
CREAT SERPL-MCNC: 0.9 MG/DL (ref 0.5–1.4)
EST. GFR  (AFRICAN AMERICAN): >60 ML/MIN/1.73 M^2
EST. GFR  (NON AFRICAN AMERICAN): >60 ML/MIN/1.73 M^2
ESTIMATED AVG GLUCOSE: 148 MG/DL (ref 68–131)
GLUCOSE SERPL-MCNC: 157 MG/DL (ref 70–110)
HBA1C MFR BLD HPLC: 6.8 % (ref 4–5.6)
HCV AB SERPL QL IA: NEGATIVE
HDLC SERPL-MCNC: 40 MG/DL (ref 40–75)
HDLC SERPL: 18.8 % (ref 20–50)
HIV 1+2 AB+HIV1 P24 AG SERPL QL IA: NEGATIVE
LDLC SERPL CALC-MCNC: 98.6 MG/DL (ref 63–159)
NONHDLC SERPL-MCNC: 173 MG/DL
POTASSIUM SERPL-SCNC: 5.3 MMOL/L (ref 3.5–5.1)
PROT SERPL-MCNC: 7 G/DL (ref 6–8.4)
SODIUM SERPL-SCNC: 141 MMOL/L (ref 136–145)
T4 FREE SERPL-MCNC: 1.04 NG/DL (ref 0.71–1.51)
TRIGL SERPL-MCNC: 372 MG/DL (ref 30–150)
TSH SERPL DL<=0.005 MIU/L-ACNC: 18.07 UIU/ML (ref 0.4–4)

## 2020-10-05 ENCOUNTER — PATIENT MESSAGE (OUTPATIENT)
Dept: ADMINISTRATIVE | Facility: OTHER | Age: 50
End: 2020-10-05

## 2020-10-05 ENCOUNTER — OFFICE VISIT (OUTPATIENT)
Dept: FAMILY MEDICINE | Facility: CLINIC | Age: 50
End: 2020-10-05
Payer: MEDICAID

## 2020-10-05 VITALS
OXYGEN SATURATION: 96 % | DIASTOLIC BLOOD PRESSURE: 80 MMHG | HEIGHT: 71 IN | WEIGHT: 229.94 LBS | HEART RATE: 78 BPM | SYSTOLIC BLOOD PRESSURE: 150 MMHG | BODY MASS INDEX: 32.19 KG/M2 | TEMPERATURE: 98 F

## 2020-10-05 DIAGNOSIS — E11.65 UNCONTROLLED TYPE 2 DIABETES MELLITUS WITH HYPERGLYCEMIA: ICD-10-CM

## 2020-10-05 DIAGNOSIS — Z78.9 DRINKS BEER: ICD-10-CM

## 2020-10-05 DIAGNOSIS — I10 ESSENTIAL HYPERTENSION: Primary | ICD-10-CM

## 2020-10-05 PROCEDURE — 99999 PR PBB SHADOW E&M-EST. PATIENT-LVL IV: ICD-10-PCS | Mod: PBBFAC,,, | Performed by: FAMILY MEDICINE

## 2020-10-05 PROCEDURE — 99999 PR PBB SHADOW E&M-EST. PATIENT-LVL IV: CPT | Mod: PBBFAC,,, | Performed by: FAMILY MEDICINE

## 2020-10-05 PROCEDURE — 99214 PR OFFICE/OUTPT VISIT, EST, LEVL IV, 30-39 MIN: ICD-10-PCS | Mod: S$PBB,,, | Performed by: FAMILY MEDICINE

## 2020-10-05 PROCEDURE — 99214 OFFICE O/P EST MOD 30 MIN: CPT | Mod: PBBFAC,PO | Performed by: FAMILY MEDICINE

## 2020-10-05 PROCEDURE — 99214 OFFICE O/P EST MOD 30 MIN: CPT | Mod: S$PBB,,, | Performed by: FAMILY MEDICINE

## 2020-10-05 RX ORDER — AMLODIPINE BESYLATE 5 MG/1
5 TABLET ORAL DAILY
Qty: 30 TABLET | Refills: 11 | Status: SHIPPED | OUTPATIENT
Start: 2020-10-05 | End: 2021-09-26

## 2020-10-05 RX ORDER — PROPRANOLOL HYDROCHLORIDE 120 MG/1
120 CAPSULE, EXTENDED RELEASE ORAL DAILY
Qty: 30 CAPSULE | Refills: 6 | Status: SHIPPED | OUTPATIENT
Start: 2020-10-05 | End: 2021-05-04

## 2020-10-05 NOTE — PROGRESS NOTES
Chief Complaint:    Chief Complaint   Patient presents with    Results       History of Present Illness:    Here to discuss labs A1c has gone liver enzymes also up.  He has not been watching his diet.    He has been drinking some beer lately blood pressure is elevated he says his home blood pressure readings are okay.  Blood pressure in he says at home is okay  Takes Lunesta for insomnia  He has shift work disorder where well controlled with current medication  Currently been laid off from his job but is handling it okay.      ROS:  Review of Systems   Constitutional: Negative for activity change, chills, fatigue, fever and unexpected weight change.   HENT: Negative for congestion, ear discharge, ear pain, hearing loss, postnasal drip and rhinorrhea.    Eyes: Negative for pain and visual disturbance.   Respiratory: Negative for cough, chest tightness and shortness of breath.    Cardiovascular: Negative for chest pain and palpitations.   Gastrointestinal: Negative for abdominal pain, diarrhea and vomiting.   Endocrine: Negative for heat intolerance.   Genitourinary: Negative for dysuria, flank pain, frequency and hematuria.   Musculoskeletal: Negative for back pain, gait problem and neck pain.   Skin: Negative for color change and rash.   Neurological: Negative for dizziness, tremors, seizures, numbness and headaches.   Psychiatric/Behavioral: Negative for agitation, hallucinations, self-injury, sleep disturbance and suicidal ideas. The patient is not nervous/anxious.        Past Medical History:   Diagnosis Date    Anxiety     Depression     Diabetes mellitus     Hypertension     Hypothyroid     Sleep disorder     Squamous cell carcinoma of skin 10/2019    right shoulder    Stroke        Social History:  Social History     Socioeconomic History    Marital status:      Spouse name: Not on file    Number of children: Not on file    Years of education: Not on file    Highest education level: Not on  "file   Occupational History    Not on file   Social Needs    Financial resource strain: Not on file    Food insecurity     Worry: Not on file     Inability: Not on file    Transportation needs     Medical: Not on file     Non-medical: Not on file   Tobacco Use    Smoking status: Former Smoker     Packs/day: 1.00     Types: Vaping w/o nicotine    Smokeless tobacco: Never Used    Tobacco comment: vape    Substance and Sexual Activity    Alcohol use: No     Comment: couple times a year    Drug use: No    Sexual activity: Yes     Partners: Female     Birth control/protection: None   Lifestyle    Physical activity     Days per week: Not on file     Minutes per session: Not on file    Stress: Not on file   Relationships    Social connections     Talks on phone: Not on file     Gets together: Not on file     Attends Holiness service: Not on file     Active member of club or organization: Not on file     Attends meetings of clubs or organizations: Not on file     Relationship status: Not on file   Other Topics Concern    Not on file   Social History Narrative    Not on file       Family History:   family history includes Cancer in his father and mother; Cataracts in his father and mother; Diabetes in his maternal grandmother; Macular degeneration in his father.    Health Maintenance   Topic Date Due    Pneumococcal Vaccine (Medium Risk) (1 of 1 - PPSV23) 04/28/1987    Eye Exam  03/01/2020    Hemoglobin A1c  03/29/2021    Foot Exam  09/29/2021    Lipid Panel  09/29/2021    Low Dose Statin  10/05/2021    TETANUS VACCINE  05/14/2025    Hepatitis C Screening  Completed       Physical Exam:    Vital Signs  Temp: 97.7 °F (36.5 °C)  Temp src: Temporal  Pulse: 78  SpO2: 96 %  BP: (!) 150/80  BP Location: Left arm  Patient Position: Sitting  Pain Score: 0-No pain  Height and Weight  Height: 5' 11" (180.3 cm)  Weight: 104.3 kg (229 lb 15 oz)  BSA (Calculated - sq m): 2.29 sq meters  BMI (Calculated): " 32.1  Weight in (lb) to have BMI = 25: 178.9]    Body mass index is 32.07 kg/m².    Physical Exam  Constitutional:       Appearance: He is well-developed.   Eyes:      Conjunctiva/sclera: Conjunctivae normal.      Pupils: Pupils are equal, round, and reactive to light.   Neck:      Musculoskeletal: Normal range of motion and neck supple.   Cardiovascular:      Rate and Rhythm: Normal rate and regular rhythm.      Pulses:           Dorsalis pedis pulses are 1+ on the right side and 0 on the left side.        Posterior tibial pulses are 1+ on the right side and 0 on the left side.      Heart sounds: Normal heart sounds. No murmur.   Pulmonary:      Effort: Pulmonary effort is normal. No respiratory distress.      Breath sounds: Normal breath sounds. No wheezing or rales.   Chest:      Chest wall: No tenderness.   Abdominal:      General: There is no distension.      Palpations: Abdomen is soft. There is no mass.      Tenderness: There is no abdominal tenderness. There is no guarding.   Musculoskeletal:         General: No tenderness.        Feet:    Feet:      Right foot:      Protective Sensation: 10 sites tested. 6 sites sensed.      Left foot:      Protective Sensation: 10 sites tested. 6 sites sensed.   Lymphadenopathy:      Cervical: No cervical adenopathy.   Skin:     General: Skin is warm and dry.   Neurological:      Mental Status: He is alert and oriented to person, place, and time.      Deep Tendon Reflexes: Reflexes are normal and symmetric.   Psychiatric:         Behavior: Behavior normal.         Thought Content: Thought content normal.         Judgment: Judgment normal.           Diabetes Management Status    Statin: Taking  ACE/ARB: Taking    Screening or Prevention Patient's value Goal Complete/Controlled?   HgA1C Testing and Control   Lab Results   Component Value Date    HGBA1C 6.8 (H) 09/29/2020      Annually/Less than 8% Yes   Lipid profile : 09/29/2020 Annually Yes   LDL control Lab Results    Component Value Date    LDLCALC 98.6 09/29/2020    Annually/Less than 100 mg/dl  Yes   Nephropathy screening No results found for: LABMICR  Lab Results   Component Value Date    PROTEINUA NEG 10/24/2006    Annually No   Blood pressure BP Readings from Last 1 Encounters:   10/05/20 (!) 150/80    Less than 140/90 Yes   Dilated retinal exam : 03/01/2019 Annually No   Foot exam   : 09/29/2020 Annually Yes       Assessment:      ICD-10-CM ICD-9-CM   1. Essential hypertension  I10 401.9   2. Uncontrolled type 2 diabetes mellitus with hyperglycemia  E11.65 250.02   3. Drinks beer  Z78.9 V49.89         Plan:    We discussed the treatment of diabetes mellitus type 2.  We discussed elimination of simple carbohydrates and replacing with complex carbohydrates.  We discussed limiting the total carbohydrate intake at each of it each meal to no more than 30 g.  Replace white bread mid 100%whole wheat bread, white rice and brown rice, and white potato with sweet potato.  Use half cup measured.  Increase intake of vegetables, urge to eat 1-2 servings of fruit but limited juice.  Recommend walking programs 20 minutes twice a day.  Change metformin to Janumet   Recommend diabetic education and dietary consultation.  Please work on stopping the beer  Check labs as below  Increase Inderal to 120 and add amlodipine 5 mg daily and monitor blood pressure carefully.      Orders Placed This Encounter   Procedures    Hypertension Digital Medicine (HDMP) Enrollment Order    Diabetes Digital Medicine (DDMP) Enrollment Order       Current Outpatient Medications   Medication Sig Dispense Refill    armodafiniL (NUVIGIL) 250 mg tablet Take 1 tablet (250 mg total) by mouth once daily. 90 tablet 1    atorvastatin (LIPITOR) 20 MG tablet Take 1 tablet (20 mg total) by mouth once daily. 90 tablet 2    co-enzyme Q-10 30 mg capsule Take 30 mg by mouth 3 (three) times daily.      eszopiclone (LUNESTA) 2 MG Tab Take 1 tablet (2 mg total) by mouth  every evening. 90 tablet 1    levothyroxine (SYNTHROID) 200 MCG tablet Take 1 tablet (200 mcg total) by mouth once daily. 90 tablet 2    lisinopriL-hydrochlorothiazide (PRINZIDE,ZESTORETIC) 20-12.5 mg per tablet Take 1 tablet by mouth once daily. 90 tablet 2    MELATONIN ORAL Take by mouth. Patient states that he takes 5 mg daily      paroxetine (PAXIL) 20 MG tablet Take 1 tablet (20 mg total) by mouth every morning. 90 tablet 2    propranoloL (INDERAL LA) 120 MG 24 hr capsule Take 1 capsule (120 mg total) by mouth once daily. 30 capsule 6    sildenafil (VIAGRA) 50 MG tablet Take 1 tablet (50 mg total) by mouth daily as needed for Erectile Dysfunction. 30 tablet 11    amLODIPine (NORVASC) 5 MG tablet Take 1 tablet (5 mg total) by mouth once daily. 30 tablet 11    SITagliptan-metformin (JANUMET)  mg per tablet Take 1 tablet by mouth 2 (two) times daily with meals. 180 tablet 3     No current facility-administered medications for this visit.        Medications Discontinued During This Encounter   Medication Reason    diclofenac sodium 1 % Gel Patient no longer taking    metFORMIN (GLUCOPHAGE) 500 MG tablet     propranoloL (INDERAL LA) 80 MG 24 hr capsule        No follow-ups on file.      Delores Lazar MD

## 2020-10-06 DIAGNOSIS — E11.9 TYPE 2 DIABETES MELLITUS: ICD-10-CM

## 2020-10-06 RX ORDER — ESZOPICLONE 2 MG/1
2 TABLET, FILM COATED ORAL NIGHTLY
Qty: 90 TABLET | Refills: 1 | Status: SHIPPED | OUTPATIENT
Start: 2020-10-06 | End: 2021-03-15 | Stop reason: SDUPTHER

## 2020-10-07 ENCOUNTER — PATIENT OUTREACH (OUTPATIENT)
Dept: OTHER | Facility: OTHER | Age: 50
End: 2020-10-07

## 2020-10-07 NOTE — LETTER
October 7, 2020     Wilman Neri  [address] [place] [address]       Dear Wilman,    Welcome to [facility]! Our goal is to make care effective, proactive and convenient by using data you send us from home to better treat your chronic conditions.                My name is Radha Mckeon, and I am your dedicated Digital Medicine clinician. As an expert in medication management, I will help ensure that the medications you are taking continue to provide the intended benefits and help you reach your goals. You can reach me directly at [phone] or by sending me a message directly through your [facility] account.      I am Krystal Pickard and I will be your health . My job is to help you identify lifestyle changes to improve your disease control. We will talk about nutrition, exercise, and other ways you may be able to adjust your current habits to better your health. Additionally, we will help ensure you are completing the tests and screenings that are necessary to help manage your conditions. You can reach me directly at  or by sending me a message directly through your [facility] account.    Most importantly, YOU are at the center of this team. Together, we will work to improve your overall health and encourage you to meet your goals for a healthier lifestyle.     What we expect from YOU:  · Please take frequent home blood pressure measurements. We ask that you take at least 1 blood pressure reading per week, but more information will better help us get you know you. Be sure you rest for a few minutes before taking the reading in a quiet, comfortable place.     Be available to receive phone calls or [employer] messages, when appropriate, from your care team. Please let us know if there are any specific days or times that work best for us to reach you via phone.     Complete routine tests and screenings. Dont worry, we will help keep you on track!           What you should expect  from your Digital Medicine Care Team:   We will work with you to create a personalized plan of care and provide you with encouragement and education, including regarding lifestyle changes, that could help you manage your disease states.     We will adjust your current medications, if needed, and continue to monitor your long-term progress.     We will provide you and your physician with monthly progress reports after you have been in the program for more than 30 days.     We will send you reminders through [employer] and text messages to help ensure you do not miss any testing deadlines to help manage your disease states.    You will be able to reach us by phone or through your [employer] account by clicking our names under Care Team on the right side of the home screen.    We look forward to working with you to help manage your health,    Sincerely,    Your Digital Medicine Team    Please visit our websites to learn more:   · Hypertension: [email or web address]      Remember, we are not available for emergencies. If you have an emergency, please contact your doctors office directly or call [facility] ([phone] or [phone]) or 911.

## 2020-10-07 NOTE — PROGRESS NOTES
"Digital Medicine: Health  Introduction    Introduced Wilman Neri to Digital Medicine. Discussed health  role and recommended lifestyle modifications.    The history is provided by the patient.           Additional Enrollment Details: Introduced self to patient and received verbal consent to speak with me. Introduced patient to hypertension program, confirmed proper technique in taking readings. 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 Patient states he will also being enrolling in diabetes digital medicine program and will be receiving equipment soon.     Patient denies symptoms of hypertension, states he has had a stroke in the past. Patient reports "occasional" migraines, but does not attribute these episodes to hypertension. Patient states he will be starting new hypertension medications soon but has not started them yet.     Patient states he is currently unemployed due to being laid off. Patient states he was "fortunate enough to have a 401k which gave me enough money to carry me through". Encouraged patient to keep this positive mindset through these tough times.     HYPERTENSION  Explained hypertension digital medicine goals including BP goal less than or equal to 130/80mmHg, improved convenience of BP management and reduced risk of heart attack, kidney failure, stroke, eye disease, dementia, and death.      Explained non-pharmacologic therapies like low salt diet and physical activity can reduce blood pressure. .      Explained that we expect patient to submit several blood pressure readings per week at random times of the day, but at least 30 minutes after taking blood pressure medications. Instructed patient not to allow anyone else to use their blood pressure monitor and phone as data submitted is directly entered into medical record. Reviewed and confirmed appropriate blood pressure monitoring technique.         Patient's BP goal is less than or " equal to 130/80.Patient's BP average is 150/92 mmHg, which is above goal, per 2017 ACC/AHA Hypertension Guidelines.              Diet-Not assessed          Physical Activity-Not assessed    Medication Adherence-Medication Adherence not addressed.      Substance, Sleep, Stress-Not assessed              Topic    Eye Exam          Last 5 Patient Entered Readings                                      Current 30 Day Average: 150/92     Recent Readings 10/6/2020 10/5/2020    SBP (mmHg) 144 156    DBP (mmHg) 83 101    Pulse 70 88

## 2020-10-08 ENCOUNTER — PATIENT OUTREACH (OUTPATIENT)
Dept: OTHER | Facility: OTHER | Age: 50
End: 2020-10-08

## 2020-10-08 NOTE — PROGRESS NOTES
Digital Medicine: Clinician Introduction    Wilman Neri is a 50 y.o. male who is newly enrolled in the Digital Medicine Clinic.    The history is provided by the patient.      Review of patient's allergies indicates:  No Known AllergiesVerified pharmacy information.    DIABETES  Explained the goal of the diabetes digital medicine program is to decrease A1c within patient-specific target levels. Reviewed benefits of A1c reduction including reducing risk for kidney, eye, and nerve disease.      Explained that we expect patient to submit blood sugar readings as prescribed. Instructed patient not to allow anyone else to use their glucometer and phone as data submitted is directly entered into their medical record. Reviewed and confirmed appropriate blood sugar testing technique.      Patient reported SMBG schedule: Daily.   Reviewed signs and symptoms of hypoglycemia (weakness, dizziness, hunger, shakiness, nausea, headache, heart palpitations, sweating, fatigue, anxiety, etc.).  Reviewed treatment of hypoglycemia (15/15 rule).      Patient does not have history of hypoglycemia.    Patient's A1C goal is less than or equal to 8 per 2020 ADA guidelines. Patient's most recent A1C result is at goal. Lab Results     Component                Value               Date                     HGBA1C                   6.8 (H)             09/29/2020          .     HYPERTENSION  Explained hypertension digital medicine goals including BP goal less than or equal to 130/80mmHg, improved convenience of BP management and reduced risk of heart attack, kidney failure, stroke, eye disease, dementia, and death.     Explained non-pharmacologic therapies like low salt diet and physical activity can reduce blood pressure.       Explained that we expect patient to submit several blood pressure readings per week at random times of the day, but at least 30 minutes after taking blood pressure medications. Instructed patient not to allow anyone  else to use their blood pressure monitor and phone as data submitted is directly entered into medical record. Reviewed and confirmed appropriate blood pressure monitoring technique.         Reviewed signs/symptoms of hypertension (headache, changes in vision, chest pain, shortness of breath)   Reviewed signs/symptoms of hypotension (lightheaded, dizziness, weakness)             Last 5 Patient Entered Readings                                      Current 30 Day Average: 150/92     Recent Readings 10/6/2020 10/5/2020    SBP (mmHg) 144 156    DBP (mmHg) 83 101    Pulse 70 88        Last 6 Patient Entered Readings                                          Most Recent A1c:      Recent Readings 10/8/2020 10/7/2020    Blood Glucose (mg/dL) 152 136              Depression Screening  Wilman Neri did not answer the depression screening. He is not in treatment for depression     Sleep Apnea Screening  Patient not previously diagnosed with STEPH       Medication Affordability Screening  Patient did not answer the medication affordability questionnaires. Patient is currently not having problems affording medications    Medication Adherence-Medication adherence was assessed.  Patient continue taking medication as prescribed.            ASSESSMENT(S)  Patient's A1C goal is less than or equal to 8. Patient's most recent A1C result is at goal. Lab Results    Component                Value               Date                     HGBA1C                   6.8 (H)             09/29/2020          .     Patients BP average is 150/92 mmHg, which is above goal. Patient's BP goal is less than or equal to 130/80.     Hypertension Plan  Continue current therapy.    Diabetes Plan  Continue current therapy.       Addressed patient questions and patient has my contact information if needed prior to next outreach. Patient verbalizes understanding.      Explained the importance of self-monitoring and medication adherence. Encouraged the patient to  communicate with their health  for lifestyle modifications to help improve or maintain a healthy lifestyle.        Sent link to [facility] webpages and my contact information via [employer] for future questions.        Explained to the patient that the Digital Medicine team is not available for emergencies. Advised patient call [facility] ([phone] or [phone]) or 911 if needed.                Topic    Eye Exam           Current Medication Regimen:  Hypertension Medications             amLODIPine (NORVASC) 5 MG tablet Take 1 tablet (5 mg total) by mouth once daily.    lisinopriL-hydrochlorothiazide (PRINZIDE,ZESTORETIC) 20-12.5 mg per tablet Take 1 tablet by mouth once daily.    propranoloL (INDERAL LA) 120 MG 24 hr capsule Take 1 capsule (120 mg total) by mouth once daily.        Diabetes Medications             SITagliptan-metformin (JANUMET)  mg per tablet Take 1 tablet by mouth 2 (two) times daily with meals.

## 2020-10-21 ENCOUNTER — PATIENT OUTREACH (OUTPATIENT)
Dept: OTHER | Facility: OTHER | Age: 50
End: 2020-10-21

## 2020-10-22 ENCOUNTER — PATIENT MESSAGE (OUTPATIENT)
Dept: FAMILY MEDICINE | Facility: CLINIC | Age: 50
End: 2020-10-22

## 2020-10-22 ENCOUNTER — PATIENT MESSAGE (OUTPATIENT)
Dept: OTHER | Facility: OTHER | Age: 50
End: 2020-10-22

## 2020-10-22 RX ORDER — MODAFINIL 200 MG/1
200 TABLET ORAL DAILY
Qty: 90 TABLET | Refills: 1 | Status: SHIPPED | OUTPATIENT
Start: 2020-10-22 | End: 2022-06-14 | Stop reason: SDUPTHER

## 2020-11-01 ENCOUNTER — PATIENT MESSAGE (OUTPATIENT)
Dept: FAMILY MEDICINE | Facility: CLINIC | Age: 50
End: 2020-11-01

## 2020-11-01 DIAGNOSIS — H92.09 OTALGIA, UNSPECIFIED LATERALITY: Primary | ICD-10-CM

## 2020-11-04 ENCOUNTER — TELEPHONE (OUTPATIENT)
Dept: OTOLARYNGOLOGY | Facility: CLINIC | Age: 50
End: 2020-11-04

## 2020-11-04 ENCOUNTER — OFFICE VISIT (OUTPATIENT)
Dept: OTOLARYNGOLOGY | Facility: CLINIC | Age: 50
End: 2020-11-04
Payer: MEDICAID

## 2020-11-04 ENCOUNTER — IMMUNIZATION (OUTPATIENT)
Dept: PHARMACY | Facility: CLINIC | Age: 50
End: 2020-11-04
Payer: MEDICAID

## 2020-11-04 VITALS
HEIGHT: 71 IN | BODY MASS INDEX: 32.37 KG/M2 | DIASTOLIC BLOOD PRESSURE: 93 MMHG | TEMPERATURE: 98 F | SYSTOLIC BLOOD PRESSURE: 144 MMHG | HEART RATE: 82 BPM | WEIGHT: 231.25 LBS

## 2020-11-04 DIAGNOSIS — H92.09 OTALGIA, UNSPECIFIED LATERALITY: ICD-10-CM

## 2020-11-04 PROCEDURE — 99999 PR PBB SHADOW E&M-EST. PATIENT-LVL III: ICD-10-PCS | Mod: PBBFAC,,, | Performed by: PHYSICIAN ASSISTANT

## 2020-11-04 PROCEDURE — 99213 OFFICE O/P EST LOW 20 MIN: CPT | Mod: PBBFAC,25 | Performed by: PHYSICIAN ASSISTANT

## 2020-11-04 PROCEDURE — 99499 UNLISTED E&M SERVICE: CPT | Mod: S$PBB,,, | Performed by: PHYSICIAN ASSISTANT

## 2020-11-04 PROCEDURE — 69210 REMOVE IMPACTED EAR WAX UNI: CPT | Mod: S$PBB,,, | Performed by: PHYSICIAN ASSISTANT

## 2020-11-04 PROCEDURE — 69210 PR REMOVAL IMPACTED CERUMEN REQUIRING INSTRUMENTATION, UNILATERAL: ICD-10-PCS | Mod: S$PBB,,, | Performed by: PHYSICIAN ASSISTANT

## 2020-11-04 PROCEDURE — 99999 PR PBB SHADOW E&M-EST. PATIENT-LVL III: CPT | Mod: PBBFAC,,, | Performed by: PHYSICIAN ASSISTANT

## 2020-11-04 PROCEDURE — 99499 NO LOS: ICD-10-PCS | Mod: S$PBB,,, | Performed by: PHYSICIAN ASSISTANT

## 2020-11-04 PROCEDURE — 69210 REMOVE IMPACTED EAR WAX UNI: CPT | Mod: PBBFAC | Performed by: PHYSICIAN ASSISTANT

## 2020-11-04 NOTE — PROGRESS NOTES
"Subjective:   Patient ID: Wilman Neri is a 50 y.o. male.    Chief Complaint: Patient in with a complaint of possible wax in both ears    Patient is here to see me today for evaluation of a possible wax impaction in bilateral ears.  He has complaints of hearing loss in the affected ears, but denies pain or drainage.  This has been an issue in the past.  The patient has not been using any sort of ear drop to soften the wax. He has BAHA hearing aids at baseline.     Review of patient's allergies indicates:  No Known Allergies        Review of Systems   HENT: Positive for hearing loss.          Objective:   BP (!) 144/93   Pulse 82   Temp 97.7 °F (36.5 °C)   Ht 5' 11" (1.803 m)   Wt 104.9 kg (231 lb 4.2 oz)   BMI 32.25 kg/m²     Physical Exam  HENT:      Right Ear: There is impacted cerumen.      Left Ear: There is no impacted cerumen.        Procedure Note    CHIEF COMPLAINT:  Cerumen Impaction    Description:  The patient was seated in an exam chair.  An ear speculum was placed in the right EAC and was examined under the microscope.  Suction and/or loop curettes were used to remove a large cerumen impaction.  The tympanic membrane was visualized and was normal in appearance.  The procedure was repeated on the left side in a similar fashion.  The TM was intact and normal on this side as well.  The patient tolerated the procedure well.    Assessment:     1. Otalgia, unspecified laterality        Plan:     Otalgia, unspecified laterality  -     Ambulatory referral/consult to ENT       Cerumen impaction:  Removed today without difficulty.  I would recommend the use of a wax softening drop, either over the counter Debrox or mineral oil, on a weekly basis.  I also instructed the patient to avoid Qtips.    "

## 2020-11-04 NOTE — TELEPHONE ENCOUNTER
----- Message from Carline Valderrama sent at 11/4/2020  7:43 AM CST -----  Pt states he will be running 10-15min late, any questions call pt @ [phone]

## 2020-11-04 NOTE — LETTER
November 4, 2020      Delores Lazar MD  [address] [address] [address]           [facility]  [address] [place] [id number]  Phone: [phone]  Fax: [phone]          Patient: Wilman Neri   MR Number: [id number]   YOB: 1970   Date of Visit: 11/4/2020       Dear Dr. Delores Lazar:    Thank you for referring Wilman Neri to me for evaluation. Attached you will find relevant portions of my assessment and plan of care.    If you have questions, please do not hesitate to call me. I look forward to following Wilman Neri along with you.    Sincerely,    Wendi Leyva PA-C    Enclosure  CC:  No Recipients    If you would like to receive this communication electronically, please contact [email or web address] or [phone] to request more information on [employer] access.    For providers and/or their staff who would like to refer a patient to [facility], please contact us through our one-stop-shop provider referral line, [facility] , at [phone].    If you feel you have received this communication in error or would no longer like to receive these types of communications, please e-mail [email or web address]

## 2020-11-05 ENCOUNTER — PATIENT OUTREACH (OUTPATIENT)
Dept: OTHER | Facility: OTHER | Age: 50
End: 2020-11-05

## 2020-11-05 DIAGNOSIS — E11.65 UNCONTROLLED TYPE 2 DIABETES MELLITUS WITH HYPERGLYCEMIA: Primary | ICD-10-CM

## 2020-11-05 NOTE — PROGRESS NOTES
Digital Medicine: Clinician Follow-Up    Patient with no questions. Happy with SMBG and BP results.     The history is provided by the patient.         Last 5 Patient Entered Readings                                      Current 30 Day Average: 131/80     Recent Readings 11/3/2020 10/30/2020 10/27/2020 10/19/2020 10/17/2020    SBP (mmHg) 118 142 122 133 137    DBP (mmHg) 66 80 87 76 85    Pulse 87 80 71 69 79        Last 6 Patient Entered Readings                                          Most Recent A1c: 6.8% on 9/29/2020  (Goal: 8%)     Recent Readings 11/2/2020 10/31/2020 10/20/2020 10/19/2020 10/13/2020    Blood Glucose (mg/dL) 127 124 119 133 138               Depression Screening  Did not address depression screening.    Sleep Apnea Screening    Did not address sleep apnea screening.     Medication Affordability Screening  Did not address medication affordability screening.     Medication Adherence-Medication adherence was assessed.          ASSESSMENT(S)  Patients BP average is 131/80 mmHg, which is at goal. Patient's BP goal is less than or equal to 130/80.  Patient's A1C goal is less than or equal to 8. Patient's most recent A1C result is at goal. Lab Results    Component                Value               Date                     HGBA1C                   6.8 (H)             09/29/2020          .       Hypertension Plan  Continue current therapy.    Diabetes Plan  Continue current therapy.       Addressed patient questions and patient has my contact information if needed prior to next outreach. Patient verbalizes understanding.                 Topic    Eye Exam      There are no preventive care reminders to display for this patient.      Hypertension Medications             amLODIPine (NORVASC) 5 MG tablet Take 1 tablet (5 mg total) by mouth once daily.    lisinopriL-hydrochlorothiazide (PRINZIDE,ZESTORETIC) 20-12.5 mg per tablet Take 1 tablet by mouth once daily.    propranoloL (INDERAL LA) 120 MG 24 hr  capsule Take 1 capsule (120 mg total) by mouth once daily.        Diabetes Medications             SITagliptan-metformin (JANUMET)  mg per tablet Take 1 tablet by mouth 2 (two) times daily with meals.

## 2020-11-13 ENCOUNTER — PATIENT OUTREACH (OUTPATIENT)
Dept: OTHER | Facility: OTHER | Age: 50
End: 2020-11-13

## 2020-11-13 NOTE — PROGRESS NOTES
"Digital Medicine: Health  Follow-Up    The history is provided by the patient.             Reason for review: Blood glucose at goal and Blood pressure not at goal        Topics Covered on Call: Diet and alcohol use    Additional Follow-up details: Patient reports feeling "pretty good" about recent blood pressure and blood sugar readings. Encouraged patient to continue taking readings.     Patient reports damage from [employer] around his house has been repaired.     Patient unmotivated to create any challenges for himself at this time.              Diet-no change to diet    No change to diet.  Patient reports eating or drinking the following: Patient reports that recent lower blood pressure and blood sugar readings are due to eating salads consistently.      Physical Activity-Not assessed    Medication Adherence-Medication Adherence not addressed.        Substance, Sleep, Stress-change  stress-not assessed  Details:  Intervention(s):    Sleep-assessed  Details:Patient reports being more tired than usual during the daytime, but attributes this to anti-depressant medications which he has recently switched and has noticed a difference of not being as tired.   Intervention(s):    Alcohol -assessed  Details:Patient reports not drinking beer for 1 day recently; but is still trying to reach the goal of consistently cutting back on beer one day during weekdays.   Intervention(s):    Tobacco-Not Assessed  Details:  Intervention(s):                  Topic    Eye Exam          Last 5 Patient Entered Readings                                      Current 30 Day Average: 133/81     Recent Readings 11/11/2020 11/3/2020 10/30/2020 10/27/2020 10/19/2020    SBP (mmHg) 146 118 142 122 133    DBP (mmHg) 90 66 80 87 76    Pulse 73 87 80 71 69        Last 6 Patient Entered Readings                                          Most Recent A1c: 6.8% on 9/29/2020  (Goal: 8%)     Recent Readings 11/3/2020 11/2/2020 10/31/2020 10/20/2020 " 10/19/2020    Blood Glucose (mg/dL) 132 127 124 119 133

## 2020-12-22 ENCOUNTER — PATIENT OUTREACH (OUTPATIENT)
Dept: OTHER | Facility: OTHER | Age: 50
End: 2020-12-22

## 2021-02-09 ENCOUNTER — LAB VISIT (OUTPATIENT)
Dept: LAB | Facility: HOSPITAL | Age: 51
End: 2021-02-09
Attending: UROLOGY
Payer: MEDICAID

## 2021-02-09 DIAGNOSIS — N48.6 PEYRONIE'S DISEASE: ICD-10-CM

## 2021-02-09 DIAGNOSIS — Z12.5 PROSTATE CANCER SCREENING: ICD-10-CM

## 2021-02-09 DIAGNOSIS — N52.9 ERECTILE DYSFUNCTION, UNSPECIFIED ERECTILE DYSFUNCTION TYPE: ICD-10-CM

## 2021-02-09 PROCEDURE — 36415 COLL VENOUS BLD VENIPUNCTURE: CPT | Mod: PO

## 2021-02-09 PROCEDURE — 84153 ASSAY OF PSA TOTAL: CPT

## 2021-02-10 LAB — COMPLEXED PSA SERPL-MCNC: 0.26 NG/ML (ref 0–4)

## 2021-02-24 NOTE — PROGRESS NOTES
Extending outreach- patient with no SMBG or BP readings in last 30-days. Health  trying to outreach patient with 4 unsuccessful attempts. Sending text message reminder to take readings.     Radha Mckeon, PharmD  Digital Medicine Clinical Pharmacist Clinician  [phone]

## 2021-02-26 ENCOUNTER — PATIENT MESSAGE (OUTPATIENT)
Dept: FAMILY MEDICINE | Facility: CLINIC | Age: 51
End: 2021-02-26

## 2021-02-26 ENCOUNTER — HOSPITAL ENCOUNTER (EMERGENCY)
Facility: HOSPITAL | Age: 51
Discharge: HOME | End: 2021-02-27
Attending: EMERGENCY MEDICINE
Payer: MEDICAID

## 2021-02-26 DIAGNOSIS — R20.0 NUMBNESS: ICD-10-CM

## 2021-02-26 DIAGNOSIS — R20.2 PARESTHESIAS: Primary | ICD-10-CM

## 2021-02-26 LAB — POCT GLUCOSE: 234 MG/DL (ref 70–110)

## 2021-02-26 PROCEDURE — U0002 COVID-19 LAB TEST NON-CDC: HCPCS | Performed by: EMERGENCY MEDICINE

## 2021-02-26 PROCEDURE — 85610 PROTHROMBIN TIME: CPT

## 2021-02-26 PROCEDURE — 85730 THROMBOPLASTIN TIME PARTIAL: CPT

## 2021-02-26 PROCEDURE — 82962 GLUCOSE BLOOD TEST: CPT

## 2021-02-26 PROCEDURE — 99285 EMERGENCY DEPT VISIT HI MDM: CPT | Mod: 25

## 2021-02-26 PROCEDURE — 85025 COMPLETE CBC W/AUTO DIFF WBC: CPT

## 2021-02-26 PROCEDURE — 80053 COMPREHEN METABOLIC PANEL: CPT

## 2021-02-26 RX ORDER — BUTALBITAL, ACETAMINOPHEN AND CAFFEINE 50; 325; 40 MG/1; MG/1; MG/1
1 TABLET ORAL EVERY 4 HOURS PRN
Qty: 30 TABLET | Refills: 1 | Status: SHIPPED | OUTPATIENT
Start: 2021-02-26 | End: 2023-02-12 | Stop reason: SDUPTHER

## 2021-02-27 VITALS
TEMPERATURE: 98 F | SYSTOLIC BLOOD PRESSURE: 143 MMHG | BODY MASS INDEX: 32.35 KG/M2 | DIASTOLIC BLOOD PRESSURE: 88 MMHG | HEIGHT: 72 IN | RESPIRATION RATE: 18 BRPM | OXYGEN SATURATION: 96 % | WEIGHT: 238.88 LBS | HEART RATE: 86 BPM

## 2021-02-27 LAB
ALBUMIN SERPL BCP-MCNC: 4.2 G/DL (ref 3.5–5.2)
ALP SERPL-CCNC: 64 U/L (ref 55–135)
ALT SERPL W/O P-5'-P-CCNC: 90 U/L (ref 10–44)
ANION GAP SERPL CALC-SCNC: 15 MMOL/L (ref 8–16)
APTT BLDCRRT: 24.3 SEC (ref 21–32)
AST SERPL-CCNC: 51 U/L (ref 10–40)
BASOPHILS # BLD AUTO: 0.1 K/UL (ref 0–0.2)
BASOPHILS NFR BLD: 1.1 % (ref 0–1.9)
BILIRUB SERPL-MCNC: 0.4 MG/DL (ref 0.1–1)
BUN SERPL-MCNC: 13 MG/DL (ref 6–20)
CALCIUM SERPL-MCNC: 9.8 MG/DL (ref 8.7–10.5)
CHLORIDE SERPL-SCNC: 97 MMOL/L (ref 95–110)
CO2 SERPL-SCNC: 23 MMOL/L (ref 23–29)
CREAT SERPL-MCNC: 1 MG/DL (ref 0.5–1.4)
CTP QC/QA: YES
DIFFERENTIAL METHOD: NORMAL
EOSINOPHIL # BLD AUTO: 0.3 K/UL (ref 0–0.5)
EOSINOPHIL NFR BLD: 3.7 % (ref 0–8)
ERYTHROCYTE [DISTWIDTH] IN BLOOD BY AUTOMATED COUNT: 13 % (ref 11.5–14.5)
EST. GFR  (AFRICAN AMERICAN): >60 ML/MIN/1.73 M^2
EST. GFR  (NON AFRICAN AMERICAN): >60 ML/MIN/1.73 M^2
GLUCOSE SERPL-MCNC: 222 MG/DL (ref 70–110)
HCT VFR BLD AUTO: 46.1 % (ref 40–54)
HGB BLD-MCNC: 15.4 G/DL (ref 14–18)
IMM GRANULOCYTES # BLD AUTO: 0.04 K/UL (ref 0–0.04)
IMM GRANULOCYTES NFR BLD AUTO: 0.4 % (ref 0–0.5)
INR PPP: 1 (ref 0.8–1.2)
LYMPHOCYTES # BLD AUTO: 2.4 K/UL (ref 1–4.8)
LYMPHOCYTES NFR BLD: 25.7 % (ref 18–48)
MCH RBC QN AUTO: 29.3 PG (ref 27–31)
MCHC RBC AUTO-ENTMCNC: 33.4 G/DL (ref 32–36)
MCV RBC AUTO: 88 FL (ref 82–98)
MONOCYTES # BLD AUTO: 0.8 K/UL (ref 0.3–1)
MONOCYTES NFR BLD: 8.3 % (ref 4–15)
NEUTROPHILS # BLD AUTO: 5.6 K/UL (ref 1.8–7.7)
NEUTROPHILS NFR BLD: 60.8 % (ref 38–73)
NRBC BLD-RTO: 0 /100 WBC
PLATELET # BLD AUTO: 292 K/UL (ref 150–350)
PMV BLD AUTO: 9.3 FL (ref 9.2–12.9)
POCT GLUCOSE: 192 MG/DL (ref 70–110)
POTASSIUM SERPL-SCNC: 4.2 MMOL/L (ref 3.5–5.1)
PROT SERPL-MCNC: 7.7 G/DL (ref 6–8.4)
PROTHROMBIN TIME: 10.8 SEC (ref 9–12.5)
RBC # BLD AUTO: 5.25 M/UL (ref 4.6–6.2)
SARS-COV-2 RDRP RESP QL NAA+PROBE: NEGATIVE
SODIUM SERPL-SCNC: 135 MMOL/L (ref 136–145)
WBC # BLD AUTO: 9.27 K/UL (ref 3.9–12.7)

## 2021-02-27 PROCEDURE — 93010 EKG 12-LEAD: ICD-10-PCS | Mod: ,,, | Performed by: INTERNAL MEDICINE

## 2021-02-27 PROCEDURE — 93005 ELECTROCARDIOGRAM TRACING: CPT

## 2021-02-27 PROCEDURE — 93010 ELECTROCARDIOGRAM REPORT: CPT | Mod: ,,, | Performed by: INTERNAL MEDICINE

## 2021-04-05 ENCOUNTER — LAB VISIT (OUTPATIENT)
Dept: LAB | Facility: HOSPITAL | Age: 51
End: 2021-04-05
Attending: FAMILY MEDICINE
Payer: MEDICAID

## 2021-04-05 ENCOUNTER — OFFICE VISIT (OUTPATIENT)
Dept: FAMILY MEDICINE | Facility: CLINIC | Age: 51
End: 2021-04-05
Payer: MEDICAID

## 2021-04-05 VITALS
TEMPERATURE: 98 F | SYSTOLIC BLOOD PRESSURE: 134 MMHG | HEART RATE: 73 BPM | WEIGHT: 238.75 LBS | HEIGHT: 72 IN | BODY MASS INDEX: 32.34 KG/M2 | OXYGEN SATURATION: 97 % | DIASTOLIC BLOOD PRESSURE: 86 MMHG

## 2021-04-05 DIAGNOSIS — G45.9 TIA (TRANSIENT ISCHEMIC ATTACK): ICD-10-CM

## 2021-04-05 DIAGNOSIS — G47.00 INSOMNIA, UNSPECIFIED TYPE: ICD-10-CM

## 2021-04-05 DIAGNOSIS — E11.9 CONTROLLED TYPE 2 DIABETES MELLITUS WITHOUT COMPLICATION, WITHOUT LONG-TERM CURRENT USE OF INSULIN: ICD-10-CM

## 2021-04-05 DIAGNOSIS — I10 ESSENTIAL HYPERTENSION: ICD-10-CM

## 2021-04-05 DIAGNOSIS — Z78.9 DRINKS BEER: ICD-10-CM

## 2021-04-05 DIAGNOSIS — E11.65 UNCONTROLLED TYPE 2 DIABETES MELLITUS WITH HYPERGLYCEMIA: ICD-10-CM

## 2021-04-05 DIAGNOSIS — E11.9 CONTROLLED TYPE 2 DIABETES MELLITUS WITHOUT COMPLICATION, WITHOUT LONG-TERM CURRENT USE OF INSULIN: Primary | ICD-10-CM

## 2021-04-05 LAB
BASOPHILS # BLD AUTO: 0.09 K/UL (ref 0–0.2)
BASOPHILS NFR BLD: 1 % (ref 0–1.9)
DIFFERENTIAL METHOD: NORMAL
EOSINOPHIL # BLD AUTO: 0.4 K/UL (ref 0–0.5)
EOSINOPHIL NFR BLD: 4.3 % (ref 0–8)
ERYTHROCYTE [DISTWIDTH] IN BLOOD BY AUTOMATED COUNT: 13.2 % (ref 11.5–14.5)
HCT VFR BLD AUTO: 44.4 % (ref 40–54)
HGB BLD-MCNC: 14.8 G/DL (ref 14–18)
IMM GRANULOCYTES # BLD AUTO: 0.03 K/UL (ref 0–0.04)
IMM GRANULOCYTES NFR BLD AUTO: 0.3 % (ref 0–0.5)
LYMPHOCYTES # BLD AUTO: 2.5 K/UL (ref 1–4.8)
LYMPHOCYTES NFR BLD: 29 % (ref 18–48)
MCH RBC QN AUTO: 29.3 PG (ref 27–31)
MCHC RBC AUTO-ENTMCNC: 33.3 G/DL (ref 32–36)
MCV RBC AUTO: 88 FL (ref 82–98)
MONOCYTES # BLD AUTO: 0.8 K/UL (ref 0.3–1)
MONOCYTES NFR BLD: 9.5 % (ref 4–15)
NEUTROPHILS # BLD AUTO: 4.8 K/UL (ref 1.8–7.7)
NEUTROPHILS NFR BLD: 55.9 % (ref 38–73)
NRBC BLD-RTO: 0 /100 WBC
PLATELET # BLD AUTO: 280 K/UL (ref 150–450)
PMV BLD AUTO: 9.6 FL (ref 9.2–12.9)
RBC # BLD AUTO: 5.05 M/UL (ref 4.6–6.2)
WBC # BLD AUTO: 8.65 K/UL (ref 3.9–12.7)

## 2021-04-05 PROCEDURE — 99214 PR OFFICE/OUTPT VISIT, EST, LEVL IV, 30-39 MIN: ICD-10-PCS | Mod: S$PBB,,, | Performed by: FAMILY MEDICINE

## 2021-04-05 PROCEDURE — 99999 PR PBB SHADOW E&M-EST. PATIENT-LVL V: ICD-10-PCS | Mod: PBBFAC,,, | Performed by: FAMILY MEDICINE

## 2021-04-05 PROCEDURE — 99215 OFFICE O/P EST HI 40 MIN: CPT | Mod: PBBFAC,PO | Performed by: FAMILY MEDICINE

## 2021-04-05 PROCEDURE — 80061 LIPID PANEL: CPT | Performed by: FAMILY MEDICINE

## 2021-04-05 PROCEDURE — 99999 PR PBB SHADOW E&M-EST. PATIENT-LVL V: CPT | Mod: PBBFAC,,, | Performed by: FAMILY MEDICINE

## 2021-04-05 PROCEDURE — 36415 COLL VENOUS BLD VENIPUNCTURE: CPT | Mod: PO | Performed by: FAMILY MEDICINE

## 2021-04-05 PROCEDURE — 99214 OFFICE O/P EST MOD 30 MIN: CPT | Mod: S$PBB,,, | Performed by: FAMILY MEDICINE

## 2021-04-05 PROCEDURE — 80053 COMPREHEN METABOLIC PANEL: CPT | Performed by: FAMILY MEDICINE

## 2021-04-05 PROCEDURE — 83036 HEMOGLOBIN GLYCOSYLATED A1C: CPT | Performed by: FAMILY MEDICINE

## 2021-04-05 PROCEDURE — 85025 COMPLETE CBC W/AUTO DIFF WBC: CPT | Performed by: FAMILY MEDICINE

## 2021-04-06 ENCOUNTER — TELEPHONE (OUTPATIENT)
Dept: RADIOLOGY | Facility: HOSPITAL | Age: 51
End: 2021-04-06

## 2021-04-06 LAB
ALBUMIN SERPL BCP-MCNC: 4 G/DL (ref 3.5–5.2)
ALP SERPL-CCNC: 53 U/L (ref 55–135)
ALT SERPL W/O P-5'-P-CCNC: 70 U/L (ref 10–44)
ANION GAP SERPL CALC-SCNC: 14 MMOL/L (ref 8–16)
AST SERPL-CCNC: 40 U/L (ref 10–40)
BILIRUB SERPL-MCNC: 0.5 MG/DL (ref 0.1–1)
BUN SERPL-MCNC: 11 MG/DL (ref 6–20)
CALCIUM SERPL-MCNC: 9.5 MG/DL (ref 8.7–10.5)
CHLORIDE SERPL-SCNC: 102 MMOL/L (ref 95–110)
CHOLEST SERPL-MCNC: 155 MG/DL (ref 120–199)
CHOLEST/HDLC SERPL: 4.6 {RATIO} (ref 2–5)
CO2 SERPL-SCNC: 20 MMOL/L (ref 23–29)
CREAT SERPL-MCNC: 1 MG/DL (ref 0.5–1.4)
EST. GFR  (AFRICAN AMERICAN): >60 ML/MIN/1.73 M^2
EST. GFR  (NON AFRICAN AMERICAN): >60 ML/MIN/1.73 M^2
ESTIMATED AVG GLUCOSE: 157 MG/DL (ref 68–131)
GLUCOSE SERPL-MCNC: 138 MG/DL (ref 70–110)
HBA1C MFR BLD: 7.1 % (ref 4–5.6)
HDLC SERPL-MCNC: 34 MG/DL (ref 40–75)
HDLC SERPL: 21.9 % (ref 20–50)
LDLC SERPL CALC-MCNC: 68 MG/DL (ref 63–159)
NONHDLC SERPL-MCNC: 121 MG/DL
POTASSIUM SERPL-SCNC: 4.6 MMOL/L (ref 3.5–5.1)
PROT SERPL-MCNC: 7.4 G/DL (ref 6–8.4)
SODIUM SERPL-SCNC: 136 MMOL/L (ref 136–145)
TRIGL SERPL-MCNC: 265 MG/DL (ref 30–150)

## 2021-04-07 ENCOUNTER — HOSPITAL ENCOUNTER (OUTPATIENT)
Dept: RADIOLOGY | Facility: HOSPITAL | Age: 51
Discharge: HOME | End: 2021-04-07
Attending: FAMILY MEDICINE
Payer: MEDICAID

## 2021-04-07 DIAGNOSIS — G45.9 TIA (TRANSIENT ISCHEMIC ATTACK): ICD-10-CM

## 2021-04-07 PROCEDURE — 93880 EXTRACRANIAL BILAT STUDY: CPT | Mod: TC

## 2021-04-07 PROCEDURE — 93880 US CAROTID BILATERAL: ICD-10-PCS | Mod: 26,,, | Performed by: RADIOLOGY

## 2021-04-07 PROCEDURE — 93880 EXTRACRANIAL BILAT STUDY: CPT | Mod: 26,,, | Performed by: RADIOLOGY

## 2021-05-29 ENCOUNTER — HOSPITAL ENCOUNTER (OUTPATIENT)
Dept: RADIOLOGY | Facility: HOSPITAL | Age: 51
Discharge: HOME | End: 2021-05-29
Attending: FAMILY MEDICINE
Payer: MEDICAID

## 2021-05-29 ENCOUNTER — OFFICE VISIT (OUTPATIENT)
Dept: INTERNAL MEDICINE | Facility: CLINIC | Age: 51
End: 2021-05-29
Payer: MEDICAID

## 2021-05-29 DIAGNOSIS — S29.9XXA INJURY OF CHEST WALL, INITIAL ENCOUNTER: ICD-10-CM

## 2021-05-29 DIAGNOSIS — S29.9XXA INJURY OF CHEST WALL, INITIAL ENCOUNTER: Primary | ICD-10-CM

## 2021-05-29 PROCEDURE — 71100 X-RAY EXAM RIBS UNI 2 VIEWS: CPT | Mod: 26,LT,, | Performed by: RADIOLOGY

## 2021-05-29 PROCEDURE — 99999 PR PBB SHADOW E&M-EST. PATIENT-LVL I: ICD-10-PCS | Mod: PBBFAC,,, | Performed by: FAMILY MEDICINE

## 2021-05-29 PROCEDURE — 99999 PR PBB SHADOW E&M-EST. PATIENT-LVL I: CPT | Mod: PBBFAC,,, | Performed by: FAMILY MEDICINE

## 2021-05-29 PROCEDURE — 99213 OFFICE O/P EST LOW 20 MIN: CPT | Mod: S$PBB,,, | Performed by: FAMILY MEDICINE

## 2021-05-29 PROCEDURE — 99211 OFF/OP EST MAY X REQ PHY/QHP: CPT | Mod: PBBFAC,25 | Performed by: FAMILY MEDICINE

## 2021-05-29 PROCEDURE — 99213 PR OFFICE/OUTPT VISIT, EST, LEVL III, 20-29 MIN: ICD-10-PCS | Mod: S$PBB,,, | Performed by: FAMILY MEDICINE

## 2021-05-29 PROCEDURE — 71100 XR RIBS 2 VIEW LEFT: ICD-10-PCS | Mod: 26,LT,, | Performed by: RADIOLOGY

## 2021-05-29 PROCEDURE — 71100 X-RAY EXAM RIBS UNI 2 VIEWS: CPT | Mod: TC,LT

## 2021-05-29 RX ORDER — HYDROCODONE BITARTRATE AND ACETAMINOPHEN 7.5; 325 MG/1; MG/1
1 TABLET ORAL EVERY 6 HOURS PRN
Qty: 21 TABLET | Refills: 0 | Status: SHIPPED | OUTPATIENT
Start: 2021-05-29 | End: 2022-01-13

## 2021-07-06 ENCOUNTER — LAB VISIT (OUTPATIENT)
Dept: LAB | Facility: HOSPITAL | Age: 51
End: 2021-07-06
Attending: FAMILY MEDICINE
Payer: MEDICAID

## 2021-07-06 DIAGNOSIS — G47.00 INSOMNIA, UNSPECIFIED TYPE: ICD-10-CM

## 2021-07-06 DIAGNOSIS — Z78.9 DRINKS BEER: ICD-10-CM

## 2021-07-06 DIAGNOSIS — I10 ESSENTIAL HYPERTENSION: ICD-10-CM

## 2021-07-06 DIAGNOSIS — E11.9 CONTROLLED TYPE 2 DIABETES MELLITUS WITHOUT COMPLICATION, WITHOUT LONG-TERM CURRENT USE OF INSULIN: ICD-10-CM

## 2021-07-06 LAB
ALBUMIN SERPL BCP-MCNC: 4 G/DL (ref 3.5–5.2)
ALP SERPL-CCNC: 52 U/L (ref 55–135)
ALT SERPL W/O P-5'-P-CCNC: 73 U/L (ref 10–44)
ANION GAP SERPL CALC-SCNC: 9 MMOL/L (ref 8–16)
AST SERPL-CCNC: 50 U/L (ref 10–40)
BASOPHILS # BLD AUTO: 0.08 K/UL (ref 0–0.2)
BASOPHILS NFR BLD: 0.9 % (ref 0–1.9)
BILIRUB SERPL-MCNC: 0.5 MG/DL (ref 0.1–1)
BUN SERPL-MCNC: 9 MG/DL (ref 6–20)
CALCIUM SERPL-MCNC: 9.5 MG/DL (ref 8.7–10.5)
CHLORIDE SERPL-SCNC: 101 MMOL/L (ref 95–110)
CHOLEST SERPL-MCNC: 156 MG/DL (ref 120–199)
CHOLEST/HDLC SERPL: 5.2 {RATIO} (ref 2–5)
CO2 SERPL-SCNC: 24 MMOL/L (ref 23–29)
CREAT SERPL-MCNC: 1.1 MG/DL (ref 0.5–1.4)
DIFFERENTIAL METHOD: NORMAL
EOSINOPHIL # BLD AUTO: 0.4 K/UL (ref 0–0.5)
EOSINOPHIL NFR BLD: 4 % (ref 0–8)
ERYTHROCYTE [DISTWIDTH] IN BLOOD BY AUTOMATED COUNT: 13.1 % (ref 11.5–14.5)
EST. GFR  (AFRICAN AMERICAN): >60 ML/MIN/1.73 M^2
EST. GFR  (NON AFRICAN AMERICAN): >60 ML/MIN/1.73 M^2
GLUCOSE SERPL-MCNC: 132 MG/DL (ref 70–110)
HCT VFR BLD AUTO: 46.1 % (ref 40–54)
HDLC SERPL-MCNC: 30 MG/DL (ref 40–75)
HDLC SERPL: 19.2 % (ref 20–50)
HGB BLD-MCNC: 15.7 G/DL (ref 14–18)
IMM GRANULOCYTES # BLD AUTO: 0.03 K/UL (ref 0–0.04)
IMM GRANULOCYTES NFR BLD AUTO: 0.3 % (ref 0–0.5)
LDLC SERPL CALC-MCNC: 71.6 MG/DL (ref 63–159)
LYMPHOCYTES # BLD AUTO: 2.7 K/UL (ref 1–4.8)
LYMPHOCYTES NFR BLD: 30.6 % (ref 18–48)
MCH RBC QN AUTO: 29.7 PG (ref 27–31)
MCHC RBC AUTO-ENTMCNC: 34.1 G/DL (ref 32–36)
MCV RBC AUTO: 87 FL (ref 82–98)
MONOCYTES # BLD AUTO: 0.8 K/UL (ref 0.3–1)
MONOCYTES NFR BLD: 8.8 % (ref 4–15)
NEUTROPHILS # BLD AUTO: 4.8 K/UL (ref 1.8–7.7)
NEUTROPHILS NFR BLD: 55.4 % (ref 38–73)
NONHDLC SERPL-MCNC: 126 MG/DL
NRBC BLD-RTO: 0 /100 WBC
PLATELET # BLD AUTO: 267 K/UL (ref 150–450)
PMV BLD AUTO: 9.2 FL (ref 9.2–12.9)
POTASSIUM SERPL-SCNC: 4.3 MMOL/L (ref 3.5–5.1)
PROT SERPL-MCNC: 7.3 G/DL (ref 6–8.4)
RBC # BLD AUTO: 5.28 M/UL (ref 4.6–6.2)
SODIUM SERPL-SCNC: 134 MMOL/L (ref 136–145)
TRIGL SERPL-MCNC: 272 MG/DL (ref 30–150)
WBC # BLD AUTO: 8.66 K/UL (ref 3.9–12.7)

## 2021-07-06 PROCEDURE — 80061 LIPID PANEL: CPT | Performed by: FAMILY MEDICINE

## 2021-07-06 PROCEDURE — 85025 COMPLETE CBC W/AUTO DIFF WBC: CPT | Performed by: FAMILY MEDICINE

## 2021-07-06 PROCEDURE — 36415 COLL VENOUS BLD VENIPUNCTURE: CPT | Mod: PO | Performed by: FAMILY MEDICINE

## 2021-07-06 PROCEDURE — 83036 HEMOGLOBIN GLYCOSYLATED A1C: CPT | Performed by: FAMILY MEDICINE

## 2021-07-06 PROCEDURE — 80053 COMPREHEN METABOLIC PANEL: CPT | Performed by: FAMILY MEDICINE

## 2021-07-07 LAB
ESTIMATED AVG GLUCOSE: 140 MG/DL (ref 68–131)
HBA1C MFR BLD: 6.5 % (ref 4–5.6)

## 2021-07-12 ENCOUNTER — PATIENT MESSAGE (OUTPATIENT)
Dept: OTHER | Facility: OTHER | Age: 51
End: 2021-07-12

## 2021-07-13 ENCOUNTER — PATIENT MESSAGE (OUTPATIENT)
Dept: OTHER | Facility: OTHER | Age: 51
End: 2021-07-13

## 2021-10-08 ENCOUNTER — TELEPHONE (OUTPATIENT)
Dept: FAMILY MEDICINE | Facility: CLINIC | Age: 51
End: 2021-10-08

## 2021-11-17 ENCOUNTER — IMMUNIZATION (OUTPATIENT)
Dept: PRIMARY CARE CLINIC | Facility: CLINIC | Age: 51
End: 2021-11-17
Payer: MEDICAID

## 2021-11-17 DIAGNOSIS — Z23 NEED FOR VACCINATION: Primary | ICD-10-CM

## 2021-11-17 PROCEDURE — 0004A COVID-19, MRNA, LNP-S, PF, 30 MCG/0.3 ML DOSE VACCINE: CPT | Mod: CV19,PBBFAC | Performed by: FAMILY MEDICINE

## 2021-12-29 ENCOUNTER — PATIENT MESSAGE (OUTPATIENT)
Dept: INTERNAL MEDICINE | Facility: CLINIC | Age: 51
End: 2021-12-29
Payer: MEDICAID

## 2021-12-29 RX ORDER — ESZOPICLONE 2 MG/1
2 TABLET, FILM COATED ORAL NIGHTLY
Qty: 90 TABLET | Refills: 0 | Status: CANCELLED | OUTPATIENT
Start: 2021-12-29

## 2022-01-13 ENCOUNTER — LAB VISIT (OUTPATIENT)
Dept: LAB | Facility: HOSPITAL | Age: 52
End: 2022-01-13
Attending: FAMILY MEDICINE
Payer: MEDICAID

## 2022-01-13 ENCOUNTER — OFFICE VISIT (OUTPATIENT)
Dept: FAMILY MEDICINE | Facility: CLINIC | Age: 52
End: 2022-01-13
Payer: MEDICAID

## 2022-01-13 DIAGNOSIS — E03.4 HYPOTHYROIDISM DUE TO ACQUIRED ATROPHY OF THYROID: ICD-10-CM

## 2022-01-13 DIAGNOSIS — E11.65 UNCONTROLLED TYPE 2 DIABETES MELLITUS WITH HYPERGLYCEMIA: ICD-10-CM

## 2022-01-13 DIAGNOSIS — Z01.00 DIABETIC EYE EXAM: ICD-10-CM

## 2022-01-13 DIAGNOSIS — G47.00 INSOMNIA, UNSPECIFIED TYPE: ICD-10-CM

## 2022-01-13 DIAGNOSIS — E11.9 DIABETIC EYE EXAM: ICD-10-CM

## 2022-01-13 DIAGNOSIS — I10 PRIMARY HYPERTENSION: ICD-10-CM

## 2022-01-13 DIAGNOSIS — I10 PRIMARY HYPERTENSION: Primary | ICD-10-CM

## 2022-01-13 LAB
ALBUMIN SERPL BCP-MCNC: 4.1 G/DL (ref 3.5–5.2)
ALP SERPL-CCNC: 50 U/L (ref 55–135)
ALT SERPL W/O P-5'-P-CCNC: 88 U/L (ref 10–44)
ANION GAP SERPL CALC-SCNC: 8 MMOL/L (ref 8–16)
AST SERPL-CCNC: 36 U/L (ref 10–40)
BASOPHILS # BLD AUTO: 0.12 K/UL (ref 0–0.2)
BASOPHILS NFR BLD: 1.1 % (ref 0–1.9)
BILIRUB SERPL-MCNC: 0.3 MG/DL (ref 0.1–1)
BUN SERPL-MCNC: 8 MG/DL (ref 6–20)
CALCIUM SERPL-MCNC: 10.2 MG/DL (ref 8.7–10.5)
CHLORIDE SERPL-SCNC: 99 MMOL/L (ref 95–110)
CHOLEST SERPL-MCNC: 196 MG/DL (ref 120–199)
CHOLEST/HDLC SERPL: 5.6 {RATIO} (ref 2–5)
CO2 SERPL-SCNC: 26 MMOL/L (ref 23–29)
CREAT SERPL-MCNC: 1 MG/DL (ref 0.5–1.4)
DIFFERENTIAL METHOD: NORMAL
EOSINOPHIL # BLD AUTO: 0.5 K/UL (ref 0–0.5)
EOSINOPHIL NFR BLD: 4.4 % (ref 0–8)
ERYTHROCYTE [DISTWIDTH] IN BLOOD BY AUTOMATED COUNT: 12.8 % (ref 11.5–14.5)
EST. GFR  (AFRICAN AMERICAN): >60 ML/MIN/1.73 M^2
EST. GFR  (NON AFRICAN AMERICAN): >60 ML/MIN/1.73 M^2
ESTIMATED AVG GLUCOSE: 131 MG/DL (ref 68–131)
GLUCOSE SERPL-MCNC: 105 MG/DL (ref 70–110)
HBA1C MFR BLD: 6.2 % (ref 4–5.6)
HCT VFR BLD AUTO: 49.3 % (ref 40–54)
HDLC SERPL-MCNC: 35 MG/DL (ref 40–75)
HDLC SERPL: 17.9 % (ref 20–50)
HGB BLD-MCNC: 16 G/DL (ref 14–18)
IMM GRANULOCYTES # BLD AUTO: 0.04 K/UL (ref 0–0.04)
IMM GRANULOCYTES NFR BLD AUTO: 0.4 % (ref 0–0.5)
LDLC SERPL CALC-MCNC: ABNORMAL MG/DL (ref 63–159)
LYMPHOCYTES # BLD AUTO: 3.5 K/UL (ref 1–4.8)
LYMPHOCYTES NFR BLD: 33.2 % (ref 18–48)
MCH RBC QN AUTO: 29.4 PG (ref 27–31)
MCHC RBC AUTO-ENTMCNC: 32.5 G/DL (ref 32–36)
MCV RBC AUTO: 91 FL (ref 82–98)
MONOCYTES # BLD AUTO: 1 K/UL (ref 0.3–1)
MONOCYTES NFR BLD: 9 % (ref 4–15)
NEUTROPHILS # BLD AUTO: 5.5 K/UL (ref 1.8–7.7)
NEUTROPHILS NFR BLD: 51.9 % (ref 38–73)
NONHDLC SERPL-MCNC: 161 MG/DL
NRBC BLD-RTO: 0 /100 WBC
PLATELET # BLD AUTO: 384 K/UL (ref 150–450)
PMV BLD AUTO: 9.4 FL (ref 9.2–12.9)
POTASSIUM SERPL-SCNC: 4.6 MMOL/L (ref 3.5–5.1)
PROT SERPL-MCNC: 8.4 G/DL (ref 6–8.4)
RBC # BLD AUTO: 5.44 M/UL (ref 4.6–6.2)
SODIUM SERPL-SCNC: 133 MMOL/L (ref 136–145)
TRIGL SERPL-MCNC: 480 MG/DL (ref 30–150)
WBC # BLD AUTO: 10.56 K/UL (ref 3.9–12.7)

## 2022-01-13 PROCEDURE — 4010F ACE/ARB THERAPY RXD/TAKEN: CPT | Mod: CPTII,,, | Performed by: FAMILY MEDICINE

## 2022-01-13 PROCEDURE — 80053 COMPREHEN METABOLIC PANEL: CPT | Performed by: FAMILY MEDICINE

## 2022-01-13 PROCEDURE — 3044F PR MOST RECENT HEMOGLOBIN A1C LEVEL <7.0%: ICD-10-PCS | Mod: CPTII,,, | Performed by: FAMILY MEDICINE

## 2022-01-13 PROCEDURE — 3044F HG A1C LEVEL LT 7.0%: CPT | Mod: CPTII,,, | Performed by: FAMILY MEDICINE

## 2022-01-13 PROCEDURE — 4010F PR ACE/ARB THEARPY RXD/TAKEN: ICD-10-PCS | Mod: CPTII,,, | Performed by: FAMILY MEDICINE

## 2022-01-13 PROCEDURE — 80061 LIPID PANEL: CPT | Performed by: FAMILY MEDICINE

## 2022-01-13 PROCEDURE — 99214 OFFICE O/P EST MOD 30 MIN: CPT | Mod: S$PBB,,, | Performed by: FAMILY MEDICINE

## 2022-01-13 PROCEDURE — 85025 COMPLETE CBC W/AUTO DIFF WBC: CPT | Performed by: FAMILY MEDICINE

## 2022-01-13 PROCEDURE — 83036 HEMOGLOBIN GLYCOSYLATED A1C: CPT | Performed by: FAMILY MEDICINE

## 2022-01-13 PROCEDURE — 1159F MED LIST DOCD IN RCRD: CPT | Mod: CPTII,,, | Performed by: FAMILY MEDICINE

## 2022-01-13 PROCEDURE — 90750 HZV VACC RECOMBINANT IM: CPT | Mod: PBBFAC,PO

## 2022-01-13 PROCEDURE — 99215 OFFICE O/P EST HI 40 MIN: CPT | Mod: PBBFAC,PO | Performed by: FAMILY MEDICINE

## 2022-01-13 PROCEDURE — 36415 COLL VENOUS BLD VENIPUNCTURE: CPT | Mod: PO | Performed by: FAMILY MEDICINE

## 2022-01-13 PROCEDURE — 3078F PR MOST RECENT DIASTOLIC BLOOD PRESSURE < 80 MM HG: ICD-10-PCS | Mod: CPTII,,, | Performed by: FAMILY MEDICINE

## 2022-01-13 PROCEDURE — 3075F SYST BP GE 130 - 139MM HG: CPT | Mod: CPTII,,, | Performed by: FAMILY MEDICINE

## 2022-01-13 PROCEDURE — 3008F BODY MASS INDEX DOCD: CPT | Mod: CPTII,,, | Performed by: FAMILY MEDICINE

## 2022-01-13 PROCEDURE — 1159F PR MEDICATION LIST DOCUMENTED IN MEDICAL RECORD: ICD-10-PCS | Mod: CPTII,,, | Performed by: FAMILY MEDICINE

## 2022-01-13 PROCEDURE — 3008F PR BODY MASS INDEX (BMI) DOCUMENTED: ICD-10-PCS | Mod: CPTII,,, | Performed by: FAMILY MEDICINE

## 2022-01-13 PROCEDURE — 90686 IIV4 VACC NO PRSV 0.5 ML IM: CPT | Mod: PBBFAC,PO

## 2022-01-13 PROCEDURE — 3078F DIAST BP <80 MM HG: CPT | Mod: CPTII,,, | Performed by: FAMILY MEDICINE

## 2022-01-13 PROCEDURE — 99999 PR PBB SHADOW E&M-EST. PATIENT-LVL V: ICD-10-PCS | Mod: PBBFAC,,, | Performed by: FAMILY MEDICINE

## 2022-01-13 PROCEDURE — 3075F PR MOST RECENT SYSTOLIC BLOOD PRESS GE 130-139MM HG: ICD-10-PCS | Mod: CPTII,,, | Performed by: FAMILY MEDICINE

## 2022-01-13 PROCEDURE — 99214 PR OFFICE/OUTPT VISIT, EST, LEVL IV, 30-39 MIN: ICD-10-PCS | Mod: S$PBB,,, | Performed by: FAMILY MEDICINE

## 2022-01-13 PROCEDURE — 99999 PR PBB SHADOW E&M-EST. PATIENT-LVL V: CPT | Mod: PBBFAC,,, | Performed by: FAMILY MEDICINE

## 2022-01-13 RX ORDER — LISINOPRIL AND HYDROCHLOROTHIAZIDE 12.5; 2 MG/1; MG/1
1 TABLET ORAL DAILY
Qty: 90 TABLET | Refills: 1 | Status: SHIPPED | OUTPATIENT
Start: 2022-01-13 | End: 2023-04-21 | Stop reason: SDUPTHER

## 2022-01-13 RX ORDER — AMLODIPINE BESYLATE 5 MG/1
5 TABLET ORAL DAILY
Qty: 90 TABLET | Refills: 1 | Status: SHIPPED | OUTPATIENT
Start: 2022-01-13 | End: 2022-10-10

## 2022-01-13 RX ORDER — PROPRANOLOL HYDROCHLORIDE 120 MG/1
120 CAPSULE, EXTENDED RELEASE ORAL DAILY
Qty: 90 CAPSULE | Refills: 3 | Status: SHIPPED | OUTPATIENT
Start: 2022-01-13 | End: 2023-04-21 | Stop reason: SDUPTHER

## 2022-01-13 RX ORDER — LEVOTHYROXINE SODIUM 200 UG/1
200 TABLET ORAL DAILY
Qty: 90 TABLET | Refills: 2 | Status: SHIPPED | OUTPATIENT
Start: 2022-01-13 | End: 2022-10-10

## 2022-01-13 RX ORDER — PAROXETINE HYDROCHLORIDE 20 MG/1
20 TABLET, FILM COATED ORAL DAILY
Qty: 90 TABLET | Refills: 2 | Status: SHIPPED | OUTPATIENT
Start: 2022-01-13 | End: 2022-10-10

## 2022-01-13 RX ORDER — ESZOPICLONE 2 MG/1
2 TABLET, FILM COATED ORAL NIGHTLY
Qty: 90 TABLET | Refills: 1 | Status: SHIPPED | OUTPATIENT
Start: 2022-01-13 | End: 2022-07-11

## 2022-01-13 RX ORDER — ATORVASTATIN CALCIUM 20 MG/1
20 TABLET, FILM COATED ORAL DAILY
Qty: 90 TABLET | Refills: 2 | Status: SHIPPED | OUTPATIENT
Start: 2022-01-13 | End: 2023-02-12

## 2022-01-13 NOTE — PROGRESS NOTES
Chief Complaint:    Chief Complaint   Patient presents with    Medication Refill    Follow-up       History of Present Illness:  Patient presents today for medication refill follow up    Being treated for diabetes and hypertension .  He is in digital diabetes and hypertension program .  His numbers run okay    Weight is down  No labs prior to visit  Blood pressure elevated in office as he forgot to take pills, rare home readings below 140/90      Takes Lunesta for insomnia          ROS:  Review of Systems   Constitutional: Negative for activity change, chills, fatigue, fever and unexpected weight change.   HENT: Negative for congestion, ear discharge, ear pain, hearing loss, postnasal drip and rhinorrhea.    Eyes: Negative for pain and visual disturbance.   Respiratory: Negative for cough, chest tightness and shortness of breath.    Cardiovascular: Negative for chest pain and palpitations.   Gastrointestinal: Negative for abdominal pain, diarrhea and vomiting.   Endocrine: Negative for heat intolerance.   Genitourinary: Negative for dysuria, flank pain, frequency and hematuria.   Musculoskeletal: Negative for back pain, gait problem and neck pain.   Skin: Negative for color change and rash.   Neurological: Negative for dizziness, tremors, seizures, numbness and headaches.   Psychiatric/Behavioral: Negative for agitation, hallucinations, self-injury, sleep disturbance and suicidal ideas. The patient is not nervous/anxious.        Past Medical History:   Diagnosis Date    Anxiety     Depression     Diabetes mellitus     Hypertension     Hypothyroid     Sleep disorder     Squamous cell carcinoma of skin 10/2019    right shoulder    Stroke        Social History:  Social History     Socioeconomic History    Marital status:    Tobacco Use    Smoking status: Former Smoker     Packs/day: 1.00     Types: Vaping w/o nicotine    Smokeless tobacco: Never Used    Tobacco comment: vape    Substance and Sexual  Activity    Alcohol use: No     Comment: couple times a year    Drug use: No    Sexual activity: Yes     Partners: Female     Birth control/protection: None       Family History:   family history includes Cancer in his father and mother; Cataracts in his father and mother; Diabetes in his maternal grandmother; Macular degeneration in his father.    Health Maintenance   Topic Date Due    Eye Exam  03/01/2020    Foot Exam  09/29/2021    Hemoglobin A1c  01/06/2022    Lipid Panel  07/06/2022    High Dose Statin  01/13/2023    TETANUS VACCINE  05/14/2025    Hepatitis C Screening  Completed       Physical Exam:    Vital Signs  Temp: 97.9 °F (36.6 °C)  Temp src: Temporal  Pulse: 99  SpO2: 98 %  BP: (!) 154/92  Pain Score: 0-No pain  Height and Weight  Height: 6' (182.9 cm)  Weight: 102.1 kg (225 lb 1.4 oz)  BSA (Calculated - sq m): 2.28 sq meters  BMI (Calculated): 30.5  Weight in (lb) to have BMI = 25: 183.9]    Body mass index is 30.53 kg/m².    Physical Exam  Constitutional:       Appearance: He is well-developed.   HENT:      Mouth/Throat:      Mouth: Oropharynx is clear and moist.   Eyes:      Conjunctiva/sclera: Conjunctivae normal.      Pupils: Pupils are equal, round, and reactive to light.   Cardiovascular:      Rate and Rhythm: Normal rate and regular rhythm.      Heart sounds: Normal heart sounds. No murmur heard.      Pulmonary:      Effort: Pulmonary effort is normal. No respiratory distress.      Breath sounds: Normal breath sounds. No wheezing or rales.   Chest:      Chest wall: No tenderness.   Abdominal:      General: There is no distension.      Palpations: Abdomen is soft. There is no mass.      Tenderness: There is no abdominal tenderness. There is no guarding.   Musculoskeletal:         General: No tenderness or edema.      Cervical back: Normal range of motion and neck supple.   Lymphadenopathy:      Cervical: No cervical adenopathy.   Skin:     General: Skin is warm and dry.   Neurological:       Mental Status: He is alert and oriented to person, place, and time.      Deep Tendon Reflexes: Reflexes are normal and symmetric.   Psychiatric:         Mood and Affect: Mood and affect normal.         Behavior: Behavior normal.         Thought Content: Thought content normal.         Judgment: Judgment normal.           Diabetes Management Status    Statin: Taking  ACE/ARB: Taking    Screening or Prevention Patient's value Goal Complete/Controlled?   HgA1C Testing and Control   Lab Results   Component Value Date    HGBA1C 6.5 (H) 07/06/2021      Annually/Less than 8% Yes   Lipid profile : 07/06/2021 Annually Yes   LDL control Lab Results   Component Value Date    LDLCALC 71.6 07/06/2021    Annually/Less than 100 mg/dl  Yes   Nephropathy screening Lab Results   Component Value Date    LABMICR 5.0 07/06/2021     Lab Results   Component Value Date    PROTEINUA NEG 10/24/2006    Annually No   Blood pressure BP Readings from Last 1 Encounters:   01/13/22 (!) 154/92    Less than 140/90 Yes   Dilated retinal exam : 03/01/2019 Annually No   Foot exam   : 09/29/2020 Annually Yes       Assessment:      ICD-10-CM ICD-9-CM   1. Primary hypertension  I10 401.9   2. Uncontrolled type 2 diabetes mellitus with hyperglycemia  E11.65 250.02   3. Insomnia, unspecified type  G47.00 780.52   4. Hypothyroidism due to acquired atrophy of thyroid  E03.4 244.8     246.8   5. Diabetic eye exam  Z01.00 V72.0    E11.9 250.00         Plan:  Above medical problems stable  Continue current meds and plan  Zoster recombinant vaccination today  Referral to diabetic eye exam  Labs as below  Follow-up 3 months    Orders Placed This Encounter   Procedures    (In Office Administered) Zoster Recombinant Vaccine    CBC Auto Differential    Comprehensive Metabolic Panel    Lipid Panel    Hemoglobin A1C    Hemoglobin A1C    Comprehensive Metabolic Panel    CBC Auto Differential    Microalbumin/Creatinine Ratio, Urine    Lipid Panel     Ambulatory referral/consult to Optometry       Current Outpatient Medications   Medication Sig Dispense Refill    butalbital-acetaminophen-caffeine -40 mg (FIORICET, ESGIC) -40 mg per tablet Take 1 tablet by mouth every 4 (four) hours as needed for Headaches. 30 tablet 1    co-enzyme Q-10 30 mg capsule Take 30 mg by mouth 3 (three) times daily.      MELATONIN ORAL Take by mouth. Patient states that he takes 5 mg daily      modafiniL (PROVIGIL) 200 MG Tab Take 1 tablet (200 mg total) by mouth once daily. 90 tablet 1    amLODIPine (NORVASC) 5 MG tablet Take 1 tablet (5 mg total) by mouth once daily. 90 tablet 1    atorvastatin (LIPITOR) 20 MG tablet Take 1 tablet (20 mg total) by mouth once daily. 90 tablet 2    eszopiclone (LUNESTA) 2 MG Tab Take 1 tablet (2 mg total) by mouth every evening. 90 tablet 1    levothyroxine (SYNTHROID) 200 MCG tablet Take 1 tablet (200 mcg total) by mouth once daily. 90 tablet 2    lisinopriL-hydrochlorothiazide (PRINZIDE,ZESTORETIC) 20-12.5 mg per tablet Take 1 tablet by mouth once daily. 90 tablet 1    paroxetine (PAXIL) 20 MG tablet Take 1 tablet (20 mg total) by mouth once daily. 90 tablet 2    propranoloL (INDERAL LA) 120 MG 24 hr capsule Take 1 capsule (120 mg total) by mouth once daily. 90 capsule 3    sildenafil (VIAGRA) 50 MG tablet Take 1 tablet (50 mg total) by mouth daily as needed for Erectile Dysfunction. 30 tablet 11    SITagliptan-metformin (JANUMET)  mg per tablet Take 1 tablet by mouth 2 (two) times daily with meals. 180 tablet 3     No current facility-administered medications for this visit.       Medications Discontinued During This Encounter   Medication Reason    HYDROcodone-acetaminophen (NORCO) 7.5-325 mg per tablet     SITagliptan-metformin (JANUMET)  mg per tablet Reorder    atorvastatin (LIPITOR) 20 MG tablet Reorder    paroxetine (PAXIL) 20 MG tablet Reorder    levothyroxine (SYNTHROID) 200 MCG tablet Reorder     eszopiclone (LUNESTA) 2 MG Tab Reorder    amLODIPine (NORVASC) 5 MG tablet Reorder    lisinopriL-hydrochlorothiazide (PRINZIDE,ZESTORETIC) 20-12.5 mg per tablet Reorder    propranoloL (INDERAL LA) 120 MG 24 hr capsule Reorder       Follow up in about 3 months (around 4/13/2022).      Delores Lazar MD    Scribe Attestation:   I, Jose Callejas, am scribing for, and in the presence of, Dr.Arif Lazar I performed the above scribed service and the documentation accurately describes the services I performed. I attest to the accuracy of the note.    I, Dr. Delores Lazar, reviewed documentation as scribed above. I personally performed the services described in this documentation.  I agree that the record reflects my personal performance and is accurate and complete. Delores Lazar MD.  10/04/202

## 2022-01-17 VITALS
HEIGHT: 72 IN | SYSTOLIC BLOOD PRESSURE: 138 MMHG | TEMPERATURE: 98 F | DIASTOLIC BLOOD PRESSURE: 78 MMHG | OXYGEN SATURATION: 98 % | BODY MASS INDEX: 30.48 KG/M2 | HEART RATE: 99 BPM | WEIGHT: 225.06 LBS

## 2022-01-25 ENCOUNTER — PATIENT MESSAGE (OUTPATIENT)
Dept: FAMILY MEDICINE | Facility: CLINIC | Age: 52
End: 2022-01-25
Payer: MEDICAID

## 2022-01-25 DIAGNOSIS — R74.8 ELEVATED LIVER ENZYMES: ICD-10-CM

## 2022-01-25 DIAGNOSIS — K21.9 GASTROESOPHAGEAL REFLUX DISEASE, UNSPECIFIED WHETHER ESOPHAGITIS PRESENT: Primary | ICD-10-CM

## 2022-01-26 NOTE — TELEPHONE ENCOUNTER
Patient is requesting to see GI.   Having problems with GERD, but also you two had discussed him seeing gastro for his elevated liver enzymes.

## 2022-02-16 ENCOUNTER — OFFICE VISIT (OUTPATIENT)
Dept: HEPATOLOGY | Facility: CLINIC | Age: 52
End: 2022-02-16
Payer: MEDICAID

## 2022-02-16 VITALS
SYSTOLIC BLOOD PRESSURE: 140 MMHG | WEIGHT: 221.13 LBS | HEART RATE: 80 BPM | BODY MASS INDEX: 29.95 KG/M2 | DIASTOLIC BLOOD PRESSURE: 90 MMHG | RESPIRATION RATE: 18 BRPM | HEIGHT: 72 IN

## 2022-02-16 DIAGNOSIS — K21.9 GASTROESOPHAGEAL REFLUX DISEASE, UNSPECIFIED WHETHER ESOPHAGITIS PRESENT: ICD-10-CM

## 2022-02-16 DIAGNOSIS — R74.8 ELEVATED LIVER ENZYMES: ICD-10-CM

## 2022-02-16 PROCEDURE — 3044F PR MOST RECENT HEMOGLOBIN A1C LEVEL <7.0%: ICD-10-PCS | Mod: CPTII,,, | Performed by: INTERNAL MEDICINE

## 2022-02-16 PROCEDURE — 3077F SYST BP >= 140 MM HG: CPT | Mod: CPTII,,, | Performed by: INTERNAL MEDICINE

## 2022-02-16 PROCEDURE — 3008F BODY MASS INDEX DOCD: CPT | Mod: CPTII,,, | Performed by: INTERNAL MEDICINE

## 2022-02-16 PROCEDURE — 99204 PR OFFICE/OUTPT VISIT, NEW, LEVL IV, 45-59 MIN: ICD-10-PCS | Mod: S$PBB,,, | Performed by: INTERNAL MEDICINE

## 2022-02-16 PROCEDURE — 1160F PR REVIEW ALL MEDS BY PRESCRIBER/CLIN PHARMACIST DOCUMENTED: ICD-10-PCS | Mod: CPTII,,, | Performed by: INTERNAL MEDICINE

## 2022-02-16 PROCEDURE — 3008F PR BODY MASS INDEX (BMI) DOCUMENTED: ICD-10-PCS | Mod: CPTII,,, | Performed by: INTERNAL MEDICINE

## 2022-02-16 PROCEDURE — 1160F RVW MEDS BY RX/DR IN RCRD: CPT | Mod: CPTII,,, | Performed by: INTERNAL MEDICINE

## 2022-02-16 PROCEDURE — 99215 OFFICE O/P EST HI 40 MIN: CPT | Mod: PBBFAC | Performed by: INTERNAL MEDICINE

## 2022-02-16 PROCEDURE — 99999 PR PBB SHADOW E&M-EST. PATIENT-LVL V: ICD-10-PCS | Mod: PBBFAC,,, | Performed by: INTERNAL MEDICINE

## 2022-02-16 PROCEDURE — 4010F PR ACE/ARB THEARPY RXD/TAKEN: ICD-10-PCS | Mod: CPTII,,, | Performed by: INTERNAL MEDICINE

## 2022-02-16 PROCEDURE — 3044F HG A1C LEVEL LT 7.0%: CPT | Mod: CPTII,,, | Performed by: INTERNAL MEDICINE

## 2022-02-16 PROCEDURE — 99204 OFFICE O/P NEW MOD 45 MIN: CPT | Mod: S$PBB,,, | Performed by: INTERNAL MEDICINE

## 2022-02-16 PROCEDURE — 3077F PR MOST RECENT SYSTOLIC BLOOD PRESSURE >= 140 MM HG: ICD-10-PCS | Mod: CPTII,,, | Performed by: INTERNAL MEDICINE

## 2022-02-16 PROCEDURE — 1159F MED LIST DOCD IN RCRD: CPT | Mod: CPTII,,, | Performed by: INTERNAL MEDICINE

## 2022-02-16 PROCEDURE — 3080F PR MOST RECENT DIASTOLIC BLOOD PRESSURE >= 90 MM HG: ICD-10-PCS | Mod: CPTII,,, | Performed by: INTERNAL MEDICINE

## 2022-02-16 PROCEDURE — 99999 PR PBB SHADOW E&M-EST. PATIENT-LVL V: CPT | Mod: PBBFAC,,, | Performed by: INTERNAL MEDICINE

## 2022-02-16 PROCEDURE — 4010F ACE/ARB THERAPY RXD/TAKEN: CPT | Mod: CPTII,,, | Performed by: INTERNAL MEDICINE

## 2022-02-16 PROCEDURE — 1159F PR MEDICATION LIST DOCUMENTED IN MEDICAL RECORD: ICD-10-PCS | Mod: CPTII,,, | Performed by: INTERNAL MEDICINE

## 2022-02-16 PROCEDURE — 3080F DIAST BP >= 90 MM HG: CPT | Mod: CPTII,,, | Performed by: INTERNAL MEDICINE

## 2022-02-16 NOTE — PROGRESS NOTES
Subjective:     Wilman Neri is here for evaluation of abnormal LFTs    History of Present Illness:  Wilman Neri is here for eval of abnormal LFTs. He is here with his wife. He denies any liver related complaints.     Duration of abnormality-2020  Medications/OTC/Herbal-Denies  ETOH- 2-3 cans of beer/day for 5-6 yrs, used to drink more in his 20's-late 30's  Metabolic issues- HTN, DM, hyperlipidemia, hyper triglyceridemia  BMI-29    Review of Systems   Constitutional: Negative for fatigue and fever.   Gastrointestinal: Negative for abdominal distention, abdominal pain, blood in stool, nausea and vomiting.   Musculoskeletal: Negative for arthralgias and back pain.   Skin: Negative for color change.       Objective:     Physical Exam  Constitutional:       Appearance: Normal appearance.   Eyes:      General: No scleral icterus.     Conjunctiva/sclera: Conjunctivae normal.   Abdominal:      General: Bowel sounds are normal. There is no distension.      Palpations: Abdomen is soft.      Tenderness: There is no abdominal tenderness.   Musculoskeletal:      Right lower leg: No edema.      Left lower leg: No edema.   Skin:     General: Skin is warm and dry.      Coloration: Skin is not jaundiced or pale.   Neurological:      Mental Status: He is alert and oriented to person, place, and time.   Psychiatric:         Thought Content: Thought content normal.         MELD-Na score: 6 at 2/26/2021 11:38 PM  MELD score: 6 at 2/26/2021 11:38 PM  Calculated from:  Serum Creatinine: 1.0 mg/dL at 2/26/2021 11:38 PM  Serum Sodium: 135 mmol/L at 2/26/2021 11:38 PM  Total Bilirubin: 0.4 mg/dL (Using min of 1 mg/dL) at 2/26/2021 11:38 PM  INR(ratio): 1.0 at 2/26/2021 11:38 PM  Age: 50 years    WBC   Date Value Ref Range Status   01/13/2022 10.56 3.90 - 12.70 K/uL Final     Hemoglobin   Date Value Ref Range Status   01/13/2022 16.0 14.0 - 18.0 g/dL Final     Hematocrit   Date Value Ref Range Status   01/13/2022 49.3 40.0 - 54.0 %  Final     Platelets   Date Value Ref Range Status   01/13/2022 384 150 - 450 K/uL Final     BUN   Date Value Ref Range Status   01/13/2022 8 6 - 20 mg/dL Final     Creatinine   Date Value Ref Range Status   01/13/2022 1.0 0.5 - 1.4 mg/dL Final     Glucose   Date Value Ref Range Status   01/13/2022 105 70 - 110 mg/dL Final     Calcium   Date Value Ref Range Status   01/13/2022 10.2 8.7 - 10.5 mg/dL Final     Sodium   Date Value Ref Range Status   01/13/2022 133 (L) 136 - 145 mmol/L Final     Potassium   Date Value Ref Range Status   01/13/2022 4.6 3.5 - 5.1 mmol/L Final     Chloride   Date Value Ref Range Status   01/13/2022 99 95 - 110 mmol/L Final     AST   Date Value Ref Range Status   01/13/2022 36 10 - 40 U/L Final     ALT   Date Value Ref Range Status   01/13/2022 88 (H) 10 - 44 U/L Final     Alkaline Phosphatase   Date Value Ref Range Status   01/13/2022 50 (L) 55 - 135 U/L Final     Total Bilirubin   Date Value Ref Range Status   01/13/2022 0.3 0.1 - 1.0 mg/dL Final     Comment:     For infants and newborns, interpretation of results should be based  on gestational age, weight and in agreement with clinical  observations.    Premature Infant recommended reference ranges:  Up to 24 hours.............<8.0 mg/dL  Up to 48 hours............<12.0 mg/dL  3-5 days..................<15.0 mg/dL  6-29 days.................<15.0 mg/dL       Albumin   Date Value Ref Range Status   01/13/2022 4.1 3.5 - 5.2 g/dL Final     INR   Date Value Ref Range Status   02/26/2021 1.0 0.8 - 1.2 Final     Comment:     Coumadin Therapy:  2.0 - 3.0 for INR for all indicators except mechanical heart valves  and antiphospholipid syndromes which should use 2.5 - 3.5.           Assessment/Plan:     1.Abnormal LFTs:  Suspect hepatic steatosis/hepatitis from  ETOH, underlying fatty liver disease is also suspected  Will initiate work up to rule out infectious/autoimmune/genetic disorders of the liver  Will obtain US abd and  fibroscan  Discussed  harmful  effects of ETOH, there is no known safe level of alcohol consumption for patients with ALD, so recommended to stop all ETOH intake.  Informed a standard drink contains 14 g alcohol (equivalent to 12 oz. beer [5% alcohol], 8-9 oz. malt liquor, 5 oz. table wine, or 1.5 oz. distilled spirits)  Hazardous level of alcohol for men>2 drinks     2. Obesity:  Discussed dietary recommendations- Low calorie, low carbohydrate, high protein diet with goal of loosing >3-5% to improve steatosis and >7-10% to improve histological changes if present  Discussed daily, consistent exercise    3.Metabolic syndrome:   Reduction of risk factors for CVD  Continued treatment for HTN  Optimization of blood glucose control.  Continue lipid-lowering therapy  Increase physical activity       RTC in 3 mo with preclinic labs    I have reviewed existing labs, imaging. Educated patient about disease process, prognosis. Ordered required labs, images and discussed treatment plan.     Angeles Mejia MD  Transplant Hepatologist  Dept of Hepatology, [place] [facility]

## 2022-02-17 ENCOUNTER — TELEPHONE (OUTPATIENT)
Dept: HEPATOLOGY | Facility: CLINIC | Age: 52
End: 2022-02-17
Payer: MEDICAID

## 2022-02-17 NOTE — TELEPHONE ENCOUNTER
----- Message from Alba Montesinos LPN sent at 2/16/2022  3:09 PM CST -----  Please schedule him for fibroscan asap per dr espinosa, please and thanks

## 2022-02-17 NOTE — TELEPHONE ENCOUNTER
Spoke with patient and scheduled fibroscan for Friday, 02/18/2022 at 3:00 pm. Patient is aware that this test is performed at [facility] on the 4th floor. Patient has been educated on fasting 4 hours prior to exam and that this includes any medication or water. Patient also advised to wear comfortable two-piece clothing.     Patient verbalized understanding to all and had no questions or concerns at this time.

## 2022-02-18 ENCOUNTER — PROCEDURE VISIT (OUTPATIENT)
Dept: HEPATOLOGY | Facility: CLINIC | Age: 52
End: 2022-02-18
Attending: INTERNAL MEDICINE
Payer: MEDICAID

## 2022-02-18 ENCOUNTER — HOSPITAL ENCOUNTER (OUTPATIENT)
Dept: RADIOLOGY | Facility: HOSPITAL | Age: 52
Discharge: HOME | End: 2022-02-18
Attending: INTERNAL MEDICINE
Payer: MEDICAID

## 2022-02-18 VITALS — WEIGHT: 221.13 LBS | HEIGHT: 72 IN | BODY MASS INDEX: 29.95 KG/M2

## 2022-02-18 DIAGNOSIS — R74.8 ELEVATED LIVER ENZYMES: ICD-10-CM

## 2022-02-18 PROCEDURE — 91200 LIVER ELASTOGRAPHY: CPT | Mod: 26,S$PBB,, | Performed by: INTERNAL MEDICINE

## 2022-02-18 PROCEDURE — 76705 ECHO EXAM OF ABDOMEN: CPT | Mod: TC

## 2022-02-18 PROCEDURE — 91200 LIVER ELASTOGRAPHY: CPT | Mod: PBBFAC | Performed by: INTERNAL MEDICINE

## 2022-02-18 PROCEDURE — 91200 PR LIVER ELASTOGRAPHY W/OUT IMAG W/INTERP & REPORT: ICD-10-PCS | Mod: 26,S$PBB,, | Performed by: INTERNAL MEDICINE

## 2022-02-18 PROCEDURE — 76705 ECHO EXAM OF ABDOMEN: CPT | Mod: 26,,, | Performed by: RADIOLOGY

## 2022-02-18 PROCEDURE — 76705 US ABDOMEN LIMITED: ICD-10-PCS | Mod: 26,,, | Performed by: RADIOLOGY

## 2022-02-22 ENCOUNTER — PATIENT MESSAGE (OUTPATIENT)
Dept: FAMILY MEDICINE | Facility: CLINIC | Age: 52
End: 2022-02-22
Payer: MEDICAID

## 2022-02-22 DIAGNOSIS — H91.90 HEARING LOSS, UNSPECIFIED HEARING LOSS TYPE, UNSPECIFIED LATERALITY: ICD-10-CM

## 2022-02-23 ENCOUNTER — PATIENT MESSAGE (OUTPATIENT)
Dept: HEPATOLOGY | Facility: CLINIC | Age: 52
End: 2022-02-23
Payer: MEDICAID

## 2022-02-24 ENCOUNTER — PATIENT MESSAGE (OUTPATIENT)
Dept: OTOLARYNGOLOGY | Facility: CLINIC | Age: 52
End: 2022-02-24
Payer: MEDICAID

## 2022-02-24 NOTE — PROCEDURES
Fibroscan Procedure     Name: Wilman Neri  Date of Procedure : 2022   :: Angeles Mejia MD  Diagnosis: other alcohol + ANNE    Probe: M    Fibroscan reading: 10.1 KPa    Fibrosis:F3     CAP readin dB/m    Steatosis: :S3       Interpretation: S3F3

## 2022-03-04 ENCOUNTER — PATIENT MESSAGE (OUTPATIENT)
Dept: FAMILY MEDICINE | Facility: CLINIC | Age: 52
End: 2022-03-04
Payer: MEDICAID

## 2022-03-07 ENCOUNTER — PATIENT OUTREACH (OUTPATIENT)
Dept: ADMINISTRATIVE | Facility: OTHER | Age: 52
End: 2022-03-07
Payer: MEDICAID

## 2022-03-08 ENCOUNTER — OFFICE VISIT (OUTPATIENT)
Dept: OTOLARYNGOLOGY | Facility: CLINIC | Age: 52
End: 2022-03-08
Payer: MEDICAID

## 2022-03-08 ENCOUNTER — CLINICAL SUPPORT (OUTPATIENT)
Dept: AUDIOLOGY | Facility: CLINIC | Age: 52
End: 2022-03-08
Payer: MEDICAID

## 2022-03-08 VITALS
HEIGHT: 72 IN | SYSTOLIC BLOOD PRESSURE: 120 MMHG | DIASTOLIC BLOOD PRESSURE: 85 MMHG | HEART RATE: 74 BPM | TEMPERATURE: 98 F | WEIGHT: 221.31 LBS | BODY MASS INDEX: 29.97 KG/M2

## 2022-03-08 DIAGNOSIS — H91.90 HEARING LOSS, UNSPECIFIED HEARING LOSS TYPE, UNSPECIFIED LATERALITY: ICD-10-CM

## 2022-03-08 DIAGNOSIS — H90.2 HEARING LOSS, CONDUCTIVE, MIDDLE EAR: Primary | ICD-10-CM

## 2022-03-08 DIAGNOSIS — H61.23 BILATERAL IMPACTED CERUMEN: Primary | ICD-10-CM

## 2022-03-08 DIAGNOSIS — Z96.21 STATUS POST PLACEMENT OF BONE ANCHORED HEARING AID (BAHA): ICD-10-CM

## 2022-03-08 PROCEDURE — 3044F HG A1C LEVEL LT 7.0%: CPT | Mod: CPTII,,, | Performed by: PHYSICIAN ASSISTANT

## 2022-03-08 PROCEDURE — 1159F MED LIST DOCD IN RCRD: CPT | Mod: CPTII,,, | Performed by: PHYSICIAN ASSISTANT

## 2022-03-08 PROCEDURE — 3079F PR MOST RECENT DIASTOLIC BLOOD PRESSURE 80-89 MM HG: ICD-10-PCS | Mod: CPTII,,, | Performed by: PHYSICIAN ASSISTANT

## 2022-03-08 PROCEDURE — 4010F PR ACE/ARB THEARPY RXD/TAKEN: ICD-10-PCS | Mod: CPTII,,, | Performed by: PHYSICIAN ASSISTANT

## 2022-03-08 PROCEDURE — 69210 REMOVE IMPACTED EAR WAX UNI: CPT | Mod: 50,PBBFAC | Performed by: PHYSICIAN ASSISTANT

## 2022-03-08 PROCEDURE — 3079F DIAST BP 80-89 MM HG: CPT | Mod: CPTII,,, | Performed by: PHYSICIAN ASSISTANT

## 2022-03-08 PROCEDURE — 69210 REMOVE IMPACTED EAR WAX UNI: CPT | Mod: S$PBB,,, | Performed by: PHYSICIAN ASSISTANT

## 2022-03-08 PROCEDURE — 99211 OFF/OP EST MAY X REQ PHY/QHP: CPT | Mod: PBBFAC | Performed by: AUDIOLOGIST

## 2022-03-08 PROCEDURE — 99999 PR PBB SHADOW E&M-EST. PATIENT-LVL IV: ICD-10-PCS | Mod: PBBFAC,,, | Performed by: PHYSICIAN ASSISTANT

## 2022-03-08 PROCEDURE — 4010F ACE/ARB THERAPY RXD/TAKEN: CPT | Mod: CPTII,,, | Performed by: PHYSICIAN ASSISTANT

## 2022-03-08 PROCEDURE — 99999 PR PBB SHADOW E&M-EST. PATIENT-LVL IV: CPT | Mod: PBBFAC,,, | Performed by: PHYSICIAN ASSISTANT

## 2022-03-08 PROCEDURE — 69210 PR REMOVAL IMPACTED CERUMEN REQUIRING INSTRUMENTATION, UNILATERAL: ICD-10-PCS | Mod: S$PBB,,, | Performed by: PHYSICIAN ASSISTANT

## 2022-03-08 PROCEDURE — 92557 COMPREHENSIVE HEARING TEST: CPT | Mod: PBBFAC | Performed by: AUDIOLOGIST

## 2022-03-08 PROCEDURE — 1159F PR MEDICATION LIST DOCUMENTED IN MEDICAL RECORD: ICD-10-PCS | Mod: CPTII,,, | Performed by: PHYSICIAN ASSISTANT

## 2022-03-08 PROCEDURE — 99999 PR PBB SHADOW E&M-EST. PATIENT-LVL I: ICD-10-PCS | Mod: PBBFAC,,, | Performed by: AUDIOLOGIST

## 2022-03-08 PROCEDURE — 99214 OFFICE O/P EST MOD 30 MIN: CPT | Mod: PBBFAC,27,25 | Performed by: PHYSICIAN ASSISTANT

## 2022-03-08 PROCEDURE — 3044F PR MOST RECENT HEMOGLOBIN A1C LEVEL <7.0%: ICD-10-PCS | Mod: CPTII,,, | Performed by: PHYSICIAN ASSISTANT

## 2022-03-08 PROCEDURE — 3074F PR MOST RECENT SYSTOLIC BLOOD PRESSURE < 130 MM HG: ICD-10-PCS | Mod: CPTII,,, | Performed by: PHYSICIAN ASSISTANT

## 2022-03-08 PROCEDURE — 99213 PR OFFICE/OUTPT VISIT, EST, LEVL III, 20-29 MIN: ICD-10-PCS | Mod: 25,S$PBB,, | Performed by: PHYSICIAN ASSISTANT

## 2022-03-08 PROCEDURE — 3008F PR BODY MASS INDEX (BMI) DOCUMENTED: ICD-10-PCS | Mod: CPTII,,, | Performed by: PHYSICIAN ASSISTANT

## 2022-03-08 PROCEDURE — 3008F BODY MASS INDEX DOCD: CPT | Mod: CPTII,,, | Performed by: PHYSICIAN ASSISTANT

## 2022-03-08 PROCEDURE — 99999 PR PBB SHADOW E&M-EST. PATIENT-LVL I: CPT | Mod: PBBFAC,,, | Performed by: AUDIOLOGIST

## 2022-03-08 PROCEDURE — 3074F SYST BP LT 130 MM HG: CPT | Mod: CPTII,,, | Performed by: PHYSICIAN ASSISTANT

## 2022-03-08 PROCEDURE — 99213 OFFICE O/P EST LOW 20 MIN: CPT | Mod: 25,S$PBB,, | Performed by: PHYSICIAN ASSISTANT

## 2022-03-08 NOTE — PROGRESS NOTES
Wilman Neri was seen 03/08/2022 for an audiological evaluation.  Patient has history of bilateral mastoidectomies and wears bilateral BAHA 5s. He has worn BAHA since 2011. 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 His last audiogram was in 2017 and he suspects significant decrease since then.    Results reveal a moderately severe-to-severe conductive hearing loss 250-8000 Hz for the right ear, and  mild-to-severe conductive hearing loss 250-8000 Hz for the left ear.   Speech Reception Thresholds were  60 dBHL for the right ear and 55 dBHL for the left ear.   Word recognition scores were excellent for the right ear and excellent for the left ear.      Patient was counseled on the above findings.    Recommendations include:    1.  ENT followup  2.  Continued binaural BAHA use  3.  Wear hearing protective devices around loud noise  4.  Annual audiograms

## 2022-03-08 NOTE — PROGRESS NOTES
Health Maintenance Due   Topic Date Due    Pneumococcal Vaccines (Age 0-64) (1 of 2 - PPSV23) Never done    Eye Exam  03/01/2020    Foot Exam  09/29/2021     Updates were requested from care everywhere.  Chart was reviewed for overdue [facility]active [facility] En[facility]ters ([facility]) topics (CRS, Breast Cancer Screening, Eye exam)  Health Maintenance has been updated.  LINKS immunization registry triggered.  Immunizations were reconciled.

## 2022-03-09 NOTE — PROGRESS NOTES
Subjective:   Patient ID: Wilman Neri is a 51 y.o. male.    Chief Complaint: Hearing Loss    Wilman Neri was seen today for ear impaction and  Consult with audiology for Bone Anchored Hearing Aid processor upgrade. He has been fitted with bilateral BAHA 5s and is due for an upgrade to BAHA 6 MAXs. He is currently working with [employer] to determine insurance coverage for the upgrade    Review of patient's allergies indicates:  No Known Allergies        Review of Systems   HENT: Positive for hearing loss.          Objective:   /85 (BP Location: Left arm, Patient Position: Sitting)   Pulse 74   Temp 97.9 °F (36.6 °C) (Temporal)   Ht 6' (1.829 m)   Wt 100.4 kg (221 lb 5.5 oz)   BMI 30.02 kg/m²     Physical Exam  Constitutional:       General: He is not in acute distress.     Appearance: He is well-developed.   HENT:      Head: Normocephalic and atraumatic.      Jaw: No trismus.      Right Ear: Hearing, tympanic membrane, ear canal and external ear normal. There is impacted cerumen.      Left Ear: Hearing, tympanic membrane, ear canal and external ear normal. There is impacted cerumen.      Ears:      Comments: Bilateral BAHA in place      Nose: Nose normal. No nasal deformity, septal deviation, mucosal edema or rhinorrhea.      Mouth/Throat:      Dentition: Normal dentition.      Pharynx: Uvula midline. No oropharyngeal exudate or uvula swelling.   Eyes:      General: No scleral icterus.     Conjunctiva/sclera: Conjunctivae normal.      Right eye: Right conjunctiva is not injected. No chemosis.     Left eye: Left conjunctiva is not injected. No chemosis.     Pupils: Pupils are equal, round, and reactive to light.   Neck:      Thyroid: No thyroid mass or thyromegaly.      Trachea: Trachea and phonation normal. No tracheal tenderness or tracheal deviation.   Pulmonary:      Effort: Pulmonary effort is normal. No accessory muscle usage or respiratory distress.      Breath sounds: No stridor.    Lymphadenopathy:      Head:      Right side of head: No submental, submandibular, preauricular or posterior auricular adenopathy.      Left side of head: No submental, submandibular, preauricular or posterior auricular adenopathy.      Cervical: No cervical adenopathy.      Right cervical: No superficial or deep cervical adenopathy.     Left cervical: No superficial or deep cervical adenopathy.   Skin:     General: Skin is warm and dry.      Findings: No erythema or rash.   Neurological:      Mental Status: He is alert and oriented to person, place, and time.      Cranial Nerves: No cranial nerve deficit.   Psychiatric:         Behavior: Behavior normal.         Thought Content: Thought content normal.          Procedure Note    CHIEF COMPLAINT:  Cerumen Impaction    Description:  The patient was seated in an exam chair.  An ear speculum was placed in the right EAC and was examined under the microscope.  Suction and/or loop curettes were used to remove a large cerumen impaction.  The tympanic membrane was visualized and was normal in appearance.  The procedure was repeated on the left side in a similar fashion.  The TM was intact and normal on this side as well.  The patient tolerated the procedure well.    Assessment:     1. Bilateral impacted cerumen    2. Hearing loss, unspecified hearing loss type, unspecified laterality        Plan:     Bilateral impacted cerumen    Hearing loss, unspecified hearing loss type, unspecified laterality  -     Ambulatory referral/consult to ENT     Cerumen impaction:  Removed today without difficulty.  I would recommend the use of a wax softening drop, either over the counter Debrox or mineral oil, on a weekly basis.  I also instructed the patient to avoid Qtips.    Patient meeting with audiology to discuss replacing current BAHA.

## 2022-03-09 NOTE — PROGRESS NOTES
Wilman Neri was seen 03/08/2022 for a consult for Bone Anchored Hearing Aid processor upgrade. He has been fitted with bilateral BAHA 5s and is due for an upgrade to BAHA 6 MAXs. He is currently working with [employer] to determine insurance coverage for the upgrade. I gave him my fax number, e-mail address and direct number to the department to give [employer] so that they can send LMN to complete. He will keep me posted with any progress on this and let me know if I can help in any way.

## 2022-03-31 ENCOUNTER — OFFICE VISIT (OUTPATIENT)
Dept: OPHTHALMOLOGY | Facility: CLINIC | Age: 52
End: 2022-03-31
Payer: MEDICAID

## 2022-03-31 DIAGNOSIS — E11.9 DIABETIC EYE EXAM: ICD-10-CM

## 2022-03-31 DIAGNOSIS — H52.7 REFRACTIVE ERRORS: ICD-10-CM

## 2022-03-31 DIAGNOSIS — Z01.00 DIABETIC EYE EXAM: ICD-10-CM

## 2022-03-31 DIAGNOSIS — E11.9 DIABETES MELLITUS TYPE 2 WITHOUT RETINOPATHY: Primary | ICD-10-CM

## 2022-03-31 PROCEDURE — 99999 PR PBB SHADOW E&M-EST. PATIENT-LVL III: CPT | Mod: PBBFAC,,, | Performed by: OPTOMETRIST

## 2022-03-31 PROCEDURE — 4010F PR ACE/ARB THEARPY RXD/TAKEN: ICD-10-PCS | Mod: CPTII,,, | Performed by: OPTOMETRIST

## 2022-03-31 PROCEDURE — 92015 PR REFRACTION: ICD-10-PCS | Mod: ,,, | Performed by: OPTOMETRIST

## 2022-03-31 PROCEDURE — 2023F PR DILATED RETINAL EXAM W/O EVID OF RETINOPATHY: ICD-10-PCS | Mod: CPTII,,, | Performed by: OPTOMETRIST

## 2022-03-31 PROCEDURE — 2023F DILAT RTA XM W/O RTNOPTHY: CPT | Mod: CPTII,,, | Performed by: OPTOMETRIST

## 2022-03-31 PROCEDURE — 1159F PR MEDICATION LIST DOCUMENTED IN MEDICAL RECORD: ICD-10-PCS | Mod: CPTII,,, | Performed by: OPTOMETRIST

## 2022-03-31 PROCEDURE — 92015 DETERMINE REFRACTIVE STATE: CPT | Mod: ,,, | Performed by: OPTOMETRIST

## 2022-03-31 PROCEDURE — 3044F HG A1C LEVEL LT 7.0%: CPT | Mod: CPTII,,, | Performed by: OPTOMETRIST

## 2022-03-31 PROCEDURE — 99213 OFFICE O/P EST LOW 20 MIN: CPT | Mod: PBBFAC | Performed by: OPTOMETRIST

## 2022-03-31 PROCEDURE — 92004 PR EYE EXAM, NEW PATIENT,COMPREHESV: ICD-10-PCS | Mod: S$PBB,,, | Performed by: OPTOMETRIST

## 2022-03-31 PROCEDURE — 92004 COMPRE OPH EXAM NEW PT 1/>: CPT | Mod: S$PBB,,, | Performed by: OPTOMETRIST

## 2022-03-31 PROCEDURE — 3044F PR MOST RECENT HEMOGLOBIN A1C LEVEL <7.0%: ICD-10-PCS | Mod: CPTII,,, | Performed by: OPTOMETRIST

## 2022-03-31 PROCEDURE — 1159F MED LIST DOCD IN RCRD: CPT | Mod: CPTII,,, | Performed by: OPTOMETRIST

## 2022-03-31 PROCEDURE — 4010F ACE/ARB THERAPY RXD/TAKEN: CPT | Mod: CPTII,,, | Performed by: OPTOMETRIST

## 2022-03-31 PROCEDURE — 99999 PR PBB SHADOW E&M-EST. PATIENT-LVL III: ICD-10-PCS | Mod: PBBFAC,,, | Performed by: OPTOMETRIST

## 2022-03-31 NOTE — PROGRESS NOTES
HPI     Diabetic Eye Exam     Comments: NIDDM exam  Slight visual decline from previous  Uses PAL   Recently lost 40 pounds  Diagnosed with liver disease recently   Last TRF exam 3/1/2019  Lab Results       Component                Value               Date                       HGBA1C                   6.2 (H)             01/13/2022                      Last edited by Marcelo Park on 3/31/2022  1:12 PM. (History)            Assessment /Plan     For exam results, see Encounter Report.    Diabetes mellitus type 2 without retinopathy    Diabetic eye exam  -     Ambulatory referral/consult to Optometry    Refractive errors      No Background Diabetic Retinopathy    Dispense Final Rx for glasses.  RTC 1 year  Discussed above and answered questions.

## 2022-04-04 ENCOUNTER — PATIENT MESSAGE (OUTPATIENT)
Dept: OPHTHALMOLOGY | Facility: CLINIC | Age: 52
End: 2022-04-04
Payer: MEDICAID

## 2022-04-07 ENCOUNTER — LAB VISIT (OUTPATIENT)
Dept: LAB | Facility: HOSPITAL | Age: 52
End: 2022-04-07
Attending: FAMILY MEDICINE
Payer: MEDICAID

## 2022-04-07 DIAGNOSIS — I10 PRIMARY HYPERTENSION: ICD-10-CM

## 2022-04-07 DIAGNOSIS — E11.65 UNCONTROLLED TYPE 2 DIABETES MELLITUS WITH HYPERGLYCEMIA: ICD-10-CM

## 2022-04-07 DIAGNOSIS — E03.4 HYPOTHYROIDISM DUE TO ACQUIRED ATROPHY OF THYROID: ICD-10-CM

## 2022-04-07 DIAGNOSIS — G47.00 INSOMNIA, UNSPECIFIED TYPE: ICD-10-CM

## 2022-04-07 LAB
ALBUMIN SERPL BCP-MCNC: 4 G/DL (ref 3.5–5.2)
ALP SERPL-CCNC: 36 U/L (ref 55–135)
ALT SERPL W/O P-5'-P-CCNC: 45 U/L (ref 10–44)
ANION GAP SERPL CALC-SCNC: 10 MMOL/L (ref 8–16)
AST SERPL-CCNC: 21 U/L (ref 10–40)
BASOPHILS # BLD AUTO: 0.08 K/UL (ref 0–0.2)
BASOPHILS NFR BLD: 0.9 % (ref 0–1.9)
BILIRUB SERPL-MCNC: 0.5 MG/DL (ref 0.1–1)
BUN SERPL-MCNC: 11 MG/DL (ref 6–20)
CALCIUM SERPL-MCNC: 9.8 MG/DL (ref 8.7–10.5)
CHLORIDE SERPL-SCNC: 103 MMOL/L (ref 95–110)
CHOLEST SERPL-MCNC: 178 MG/DL (ref 120–199)
CHOLEST/HDLC SERPL: 5.7 {RATIO} (ref 2–5)
CO2 SERPL-SCNC: 25 MMOL/L (ref 23–29)
CREAT SERPL-MCNC: 0.9 MG/DL (ref 0.5–1.4)
DIFFERENTIAL METHOD: NORMAL
EOSINOPHIL # BLD AUTO: 0.3 K/UL (ref 0–0.5)
EOSINOPHIL NFR BLD: 3.4 % (ref 0–8)
ERYTHROCYTE [DISTWIDTH] IN BLOOD BY AUTOMATED COUNT: 12.6 % (ref 11.5–14.5)
EST. GFR  (AFRICAN AMERICAN): >60 ML/MIN/1.73 M^2
EST. GFR  (NON AFRICAN AMERICAN): >60 ML/MIN/1.73 M^2
ESTIMATED AVG GLUCOSE: 123 MG/DL (ref 68–131)
GLUCOSE SERPL-MCNC: 117 MG/DL (ref 70–110)
HBA1C MFR BLD: 5.9 % (ref 4–5.6)
HCT VFR BLD AUTO: 45.1 % (ref 40–54)
HDLC SERPL-MCNC: 31 MG/DL (ref 40–75)
HDLC SERPL: 17.4 % (ref 20–50)
HGB BLD-MCNC: 14.7 G/DL (ref 14–18)
IMM GRANULOCYTES # BLD AUTO: 0.02 K/UL (ref 0–0.04)
IMM GRANULOCYTES NFR BLD AUTO: 0.2 % (ref 0–0.5)
LDLC SERPL CALC-MCNC: 97.2 MG/DL (ref 63–159)
LYMPHOCYTES # BLD AUTO: 3.1 K/UL (ref 1–4.8)
LYMPHOCYTES NFR BLD: 33.9 % (ref 18–48)
MCH RBC QN AUTO: 28.4 PG (ref 27–31)
MCHC RBC AUTO-ENTMCNC: 32.6 G/DL (ref 32–36)
MCV RBC AUTO: 87 FL (ref 82–98)
MONOCYTES # BLD AUTO: 0.7 K/UL (ref 0.3–1)
MONOCYTES NFR BLD: 8.1 % (ref 4–15)
NEUTROPHILS # BLD AUTO: 4.9 K/UL (ref 1.8–7.7)
NEUTROPHILS NFR BLD: 53.5 % (ref 38–73)
NONHDLC SERPL-MCNC: 147 MG/DL
NRBC BLD-RTO: 0 /100 WBC
PLATELET # BLD AUTO: 291 K/UL (ref 150–450)
PMV BLD AUTO: 9.7 FL (ref 9.2–12.9)
POTASSIUM SERPL-SCNC: 4 MMOL/L (ref 3.5–5.1)
PROT SERPL-MCNC: 7 G/DL (ref 6–8.4)
RBC # BLD AUTO: 5.18 M/UL (ref 4.6–6.2)
SODIUM SERPL-SCNC: 138 MMOL/L (ref 136–145)
TRIGL SERPL-MCNC: 249 MG/DL (ref 30–150)
WBC # BLD AUTO: 9.12 K/UL (ref 3.9–12.7)

## 2022-04-07 PROCEDURE — 80053 COMPREHEN METABOLIC PANEL: CPT | Performed by: FAMILY MEDICINE

## 2022-04-07 PROCEDURE — 83036 HEMOGLOBIN GLYCOSYLATED A1C: CPT | Performed by: FAMILY MEDICINE

## 2022-04-07 PROCEDURE — 80061 LIPID PANEL: CPT | Performed by: FAMILY MEDICINE

## 2022-04-07 PROCEDURE — 85025 COMPLETE CBC W/AUTO DIFF WBC: CPT | Performed by: FAMILY MEDICINE

## 2022-04-07 PROCEDURE — 36415 COLL VENOUS BLD VENIPUNCTURE: CPT | Mod: PO | Performed by: FAMILY MEDICINE

## 2022-04-14 ENCOUNTER — OFFICE VISIT (OUTPATIENT)
Dept: FAMILY MEDICINE | Facility: CLINIC | Age: 52
End: 2022-04-14
Payer: MEDICAID

## 2022-04-14 VITALS
OXYGEN SATURATION: 98 % | HEART RATE: 90 BPM | WEIGHT: 218.69 LBS | TEMPERATURE: 98 F | DIASTOLIC BLOOD PRESSURE: 80 MMHG | HEIGHT: 70 IN | BODY MASS INDEX: 31.31 KG/M2 | SYSTOLIC BLOOD PRESSURE: 138 MMHG

## 2022-04-14 DIAGNOSIS — G47.26 CIRCADIAN RHYTHM SLEEP DISORDER, SHIFT WORK TYPE: ICD-10-CM

## 2022-04-14 DIAGNOSIS — E03.4 HYPOTHYROIDISM DUE TO ACQUIRED ATROPHY OF THYROID: ICD-10-CM

## 2022-04-14 DIAGNOSIS — E11.65 UNCONTROLLED TYPE 2 DIABETES MELLITUS WITH HYPERGLYCEMIA: Primary | ICD-10-CM

## 2022-04-14 DIAGNOSIS — G47.00 INSOMNIA, UNSPECIFIED TYPE: ICD-10-CM

## 2022-04-14 DIAGNOSIS — I10 PRIMARY HYPERTENSION: ICD-10-CM

## 2022-04-14 DIAGNOSIS — R11.10 VOMITING, INTRACTABILITY OF VOMITING NOT SPECIFIED, PRESENCE OF NAUSEA NOT SPECIFIED, UNSPECIFIED VOMITING TYPE: ICD-10-CM

## 2022-04-14 DIAGNOSIS — K52.9 CHRONIC DIARRHEA: ICD-10-CM

## 2022-04-14 PROCEDURE — 4010F PR ACE/ARB THEARPY RXD/TAKEN: ICD-10-PCS | Mod: CPTII,,, | Performed by: FAMILY MEDICINE

## 2022-04-14 PROCEDURE — 99214 PR OFFICE/OUTPT VISIT, EST, LEVL IV, 30-39 MIN: ICD-10-PCS | Mod: S$PBB,,, | Performed by: FAMILY MEDICINE

## 2022-04-14 PROCEDURE — 3061F NEG MICROALBUMINURIA REV: CPT | Mod: CPTII,,, | Performed by: FAMILY MEDICINE

## 2022-04-14 PROCEDURE — 3044F PR MOST RECENT HEMOGLOBIN A1C LEVEL <7.0%: ICD-10-PCS | Mod: CPTII,,, | Performed by: FAMILY MEDICINE

## 2022-04-14 PROCEDURE — 3008F PR BODY MASS INDEX (BMI) DOCUMENTED: ICD-10-PCS | Mod: CPTII,,, | Performed by: FAMILY MEDICINE

## 2022-04-14 PROCEDURE — 1159F MED LIST DOCD IN RCRD: CPT | Mod: CPTII,,, | Performed by: FAMILY MEDICINE

## 2022-04-14 PROCEDURE — 3075F PR MOST RECENT SYSTOLIC BLOOD PRESS GE 130-139MM HG: ICD-10-PCS | Mod: CPTII,,, | Performed by: FAMILY MEDICINE

## 2022-04-14 PROCEDURE — 99214 OFFICE O/P EST MOD 30 MIN: CPT | Mod: PBBFAC,PO | Performed by: FAMILY MEDICINE

## 2022-04-14 PROCEDURE — 1159F PR MEDICATION LIST DOCUMENTED IN MEDICAL RECORD: ICD-10-PCS | Mod: CPTII,,, | Performed by: FAMILY MEDICINE

## 2022-04-14 PROCEDURE — 3008F BODY MASS INDEX DOCD: CPT | Mod: CPTII,,, | Performed by: FAMILY MEDICINE

## 2022-04-14 PROCEDURE — 99999 PR PBB SHADOW E&M-EST. PATIENT-LVL IV: ICD-10-PCS | Mod: PBBFAC,,, | Performed by: FAMILY MEDICINE

## 2022-04-14 PROCEDURE — 3066F PR DOCUMENTATION OF TREATMENT FOR NEPHROPATHY: ICD-10-PCS | Mod: CPTII,,, | Performed by: FAMILY MEDICINE

## 2022-04-14 PROCEDURE — 99999 PR PBB SHADOW E&M-EST. PATIENT-LVL IV: CPT | Mod: PBBFAC,,, | Performed by: FAMILY MEDICINE

## 2022-04-14 PROCEDURE — 3066F NEPHROPATHY DOC TX: CPT | Mod: CPTII,,, | Performed by: FAMILY MEDICINE

## 2022-04-14 PROCEDURE — 3044F HG A1C LEVEL LT 7.0%: CPT | Mod: CPTII,,, | Performed by: FAMILY MEDICINE

## 2022-04-14 PROCEDURE — 99214 OFFICE O/P EST MOD 30 MIN: CPT | Mod: S$PBB,,, | Performed by: FAMILY MEDICINE

## 2022-04-14 PROCEDURE — 4010F ACE/ARB THERAPY RXD/TAKEN: CPT | Mod: CPTII,,, | Performed by: FAMILY MEDICINE

## 2022-04-14 PROCEDURE — 3075F SYST BP GE 130 - 139MM HG: CPT | Mod: CPTII,,, | Performed by: FAMILY MEDICINE

## 2022-04-14 PROCEDURE — 3079F PR MOST RECENT DIASTOLIC BLOOD PRESSURE 80-89 MM HG: ICD-10-PCS | Mod: CPTII,,, | Performed by: FAMILY MEDICINE

## 2022-04-14 PROCEDURE — 3079F DIAST BP 80-89 MM HG: CPT | Mod: CPTII,,, | Performed by: FAMILY MEDICINE

## 2022-04-14 PROCEDURE — 3061F PR NEG MICROALBUMINURIA RESULT DOCUMENTED/REVIEW: ICD-10-PCS | Mod: CPTII,,, | Performed by: FAMILY MEDICINE

## 2022-04-14 NOTE — PROGRESS NOTES
"Chief Complaint:    Chief Complaint   Patient presents with    Follow-up     Lab review       History of Present Illness:  Patient with depression, HTN, and circadian rhythm sleep disorder presents today for a three month follow-up      Raising katrin.  Says he deals with diarrhea, not having normal bowel movements for 10 years. Has gone 3 times today already. Can go up to 5 times a day. Denies blood in stool, fever, or abdominal pain. Has stopped his medications before but nothing changed.   Also says he threw up every day "for a long time". Unsure if it was related to stress. Happened when he ate a big meal or right when he woke up.   A1C is 5.9, says he quit drinking.   Weight loss of seven pounds.   Liver enzymes coming down.  Taking provigil as needed now; not currently employed.   Due for second dose of shingles shot.   Says his feet have been numbness. Has six bolts in his back.   He is in digital diabetes and hypertension program .  His numbers run okay  On Lunesta for insomnia          ROS:  Review of Systems   Constitutional: Negative for activity change, chills, fatigue, fever and unexpected weight change.   HENT: Negative for congestion, ear discharge, ear pain, hearing loss, postnasal drip and rhinorrhea.    Eyes: Negative for pain and visual disturbance.   Respiratory: Negative for cough, chest tightness and shortness of breath.    Cardiovascular: Negative for chest pain and palpitations.   Gastrointestinal: Positive for diarrhea. Negative for abdominal pain, blood in stool and vomiting.   Endocrine: Negative for heat intolerance.   Genitourinary: Negative for dysuria, flank pain, frequency and hematuria.   Musculoskeletal: Negative for back pain, gait problem and neck pain.   Skin: Negative for color change and rash.   Neurological: Positive for numbness. Negative for dizziness, tremors, seizures and headaches.   Psychiatric/Behavioral: Negative for agitation, hallucinations, self-injury, sleep " "disturbance and suicidal ideas. The patient is not nervous/anxious.    All other systems reviewed and are negative.      Past Medical History:   Diagnosis Date    Anxiety     Depression     Diabetes mellitus     Hypertension     Hypothyroid     Sleep disorder     Squamous cell carcinoma of skin 10/2019    right shoulder    Stroke        Social History:  Social History     Socioeconomic History    Marital status:    Tobacco Use    Smoking status: Former Smoker     Packs/day: 1.00     Types: Vaping w/o nicotine    Smokeless tobacco: Never Used    Tobacco comment: vape    Substance and Sexual Activity    Alcohol use: No     Comment: couple times a year    Drug use: No    Sexual activity: Yes     Partners: Female     Birth control/protection: None       Family History:   family history includes Cancer in his father and mother; Cataracts in his father and mother; Diabetes in his maternal grandmother; Macular degeneration in his father.    Health Maintenance   Topic Date Due    Hemoglobin A1c  10/07/2022    Eye Exam  03/31/2023    Lipid Panel  04/07/2023    High Dose Statin  04/14/2023    Foot Exam  04/14/2023    TETANUS VACCINE  05/14/2025    Hepatitis C Screening  Completed       Physical Exam:    Vital Signs  Temp: 98.2 °F (36.8 °C)  Temp src: Tympanic  Pulse: 90  SpO2: 98 %  BP: 138/80  BP Location: Left arm  Pain Score: 0-No pain  Height and Weight  Height: 5' 10" (177.8 cm)  Weight: 99.2 kg (218 lb 11.1 oz)  BSA (Calculated - sq m): 2.21 sq meters  BMI (Calculated): 31.4  Weight in (lb) to have BMI = 25: 173.9]    Body mass index is 31.38 kg/m².    Physical Exam  Vitals and nursing note reviewed.   Constitutional:       Appearance: Normal appearance. He is well-developed.   HENT:      Head: Normocephalic and atraumatic.      Right Ear: Tympanic membrane normal.      Left Ear: Tympanic membrane normal.   Eyes:      Extraocular Movements: Extraocular movements intact.      " Conjunctiva/sclera: Conjunctivae normal.      Pupils: Pupils are equal, round, and reactive to light.   Cardiovascular:      Rate and Rhythm: Normal rate and regular rhythm.      Pulses: Normal pulses.      Heart sounds: Normal heart sounds. No murmur heard.    No gallop.   Pulmonary:      Effort: Pulmonary effort is normal. No respiratory distress.      Breath sounds: Normal breath sounds. No wheezing, rhonchi or rales.   Chest:      Chest wall: No tenderness.   Abdominal:      General: There is no distension.      Palpations: Abdomen is soft. There is no mass.      Tenderness: There is no abdominal tenderness. There is no guarding.   Musculoskeletal:         General: No swelling, tenderness, deformity or signs of injury. Normal range of motion.      Cervical back: Normal range of motion and neck supple.   Feet:      Right foot:      Protective Sensation: 10 sites tested. 8 sites sensed.      Skin integrity: Skin integrity normal.      Toenail Condition: Right toenails are normal.      Left foot:      Protective Sensation: 10 sites tested. 0 sites sensed.      Skin integrity: Skin integrity normal.      Toenail Condition: Left toenails are normal.   Lymphadenopathy:      Cervical: No cervical adenopathy.   Skin:     General: Skin is warm and dry.      Capillary Refill: Capillary refill takes less than 2 seconds.      Coloration: Skin is not jaundiced or pale.   Neurological:      General: No focal deficit present.      Mental Status: He is alert and oriented to person, place, and time.      Deep Tendon Reflexes: Reflexes are normal and symmetric.   Psychiatric:         Mood and Affect: Mood normal.         Behavior: Behavior normal.         Thought Content: Thought content normal.         Judgment: Judgment normal.           Diabetes Management Status    Statin: Taking  ACE/ARB: Taking    Screening or Prevention Patient's value Goal Complete/Controlled?   HgA1C Testing and Control   Lab Results   Component Value  Date    HGBA1C 5.9 (H) 04/07/2022      Annually/Less than 8% Yes   Lipid profile : 04/07/2022 Annually Yes   LDL control Lab Results   Component Value Date    LDLCALC 97.2 04/07/2022    Annually/Less than 100 mg/dl  Yes   Nephropathy screening Lab Results   Component Value Date    LABMICR 7.0 04/07/2022     Lab Results   Component Value Date    PROTEINUA NEG 10/24/2006    Annually No   Blood pressure BP Readings from Last 1 Encounters:   04/14/22 138/80    Less than 140/90 Yes   Dilated retinal exam : 03/31/2022 Annually No   Foot exam   : 04/14/2022 Annually Yes       Assessment:      ICD-10-CM ICD-9-CM   1. Uncontrolled type 2 diabetes mellitus with hyperglycemia  E11.65 250.02   2. Hypothyroidism due to acquired atrophy of thyroid  E03.4 244.8     246.8   3. Primary hypertension  I10 401.9   4. Chronic diarrhea  K52.9 787.91   5. Vomiting, intractability of vomiting not specified, presence of nausea not specified, unspecified vomiting type  R11.10 787.03   6. Circadian rhythm sleep disorder, shift work type  G47.26 327.36   7. Insomnia, unspecified type  G47.00 780.52         Plan:  Refer to gastroenterology for chronic diarrhea and vomiting.   Hypertension stable.   Continue alcohol cessation.   Your A1c is within normal range. Good work and keep it up.  Keep balanced diet.   Continue weight loss. Start physical activity.   Get zoster recombinant vaccine today.   Labs as below  Follow-up 3 months  Orders Placed This Encounter   Procedures    (In Office Administered) Zoster Recombinant Vaccine    Hemoglobin A1C    Comprehensive Metabolic Panel    CBC Auto Differential    Microalbumin/Creatinine Ratio, Urine    Lipid Panel    TSH    Ambulatory referral/consult to Gastroenterology       Current Outpatient Medications   Medication Sig Dispense Refill    amLODIPine (NORVASC) 5 MG tablet Take 1 tablet (5 mg total) by mouth once daily. 90 tablet 1    atorvastatin (LIPITOR) 20 MG tablet Take 1 tablet (20 mg  total) by mouth once daily. 90 tablet 2    butalbital-acetaminophen-caffeine -40 mg (FIORICET, ESGIC) -40 mg per tablet Take 1 tablet by mouth every 4 (four) hours as needed for Headaches. 30 tablet 1    co-enzyme Q-10 30 mg capsule Take 30 mg by mouth 3 (three) times daily.      eszopiclone (LUNESTA) 2 MG Tab Take 1 tablet (2 mg total) by mouth every evening. 90 tablet 1    levothyroxine (SYNTHROID) 200 MCG tablet Take 1 tablet (200 mcg total) by mouth once daily. 90 tablet 2    lisinopriL-hydrochlorothiazide (PRINZIDE,ZESTORETIC) 20-12.5 mg per tablet Take 1 tablet by mouth once daily. 90 tablet 1    MELATONIN ORAL Take by mouth. Patient states that he takes 5 mg daily      modafiniL (PROVIGIL) 200 MG Tab Take 1 tablet (200 mg total) by mouth once daily. 90 tablet 1    paroxetine (PAXIL) 20 MG tablet Take 1 tablet (20 mg total) by mouth once daily. 90 tablet 2    propranoloL (INDERAL LA) 120 MG 24 hr capsule Take 1 capsule (120 mg total) by mouth once daily. 90 capsule 3    SITagliptan-metformin (JANUMET)  mg per tablet Take 1 tablet by mouth 2 (two) times daily with meals. 180 tablet 3    sildenafil (VIAGRA) 50 MG tablet Take 1 tablet (50 mg total) by mouth daily as needed for Erectile Dysfunction. 30 tablet 11     No current facility-administered medications for this visit.       There are no discontinued medications.    Follow up in about 3 months (around 7/14/2022).      Delores Lazar MD  Scribe Attestation:   I, Nirmala Stokes, am scribing for, and in the presence of, Dr.Arif Lazar I performed the above scribed service and the documentation accurately describes the services I performed. I attest to the accuracy of the note.    I, Dr. Delores Lazar, reviewed documentation as scribed above. I performed the services described in this documentation.  I agree that the record reflects my personal performance and is accurate and complete. Delores Lazar MD.  10/04/2021

## 2022-04-30 ENCOUNTER — PATIENT MESSAGE (OUTPATIENT)
Dept: OTHER | Facility: OTHER | Age: 52
End: 2022-04-30
Payer: MEDICAID

## 2022-05-17 ENCOUNTER — OFFICE VISIT (OUTPATIENT)
Dept: HEPATOLOGY | Facility: CLINIC | Age: 52
End: 2022-05-17
Payer: MEDICAID

## 2022-05-17 VITALS
SYSTOLIC BLOOD PRESSURE: 132 MMHG | HEIGHT: 70 IN | DIASTOLIC BLOOD PRESSURE: 80 MMHG | BODY MASS INDEX: 31.25 KG/M2 | HEART RATE: 88 BPM | WEIGHT: 218.25 LBS

## 2022-05-17 DIAGNOSIS — R74.8 ELEVATED LIVER ENZYMES: Primary | ICD-10-CM

## 2022-05-17 PROCEDURE — 3066F NEPHROPATHY DOC TX: CPT | Mod: CPTII,,, | Performed by: INTERNAL MEDICINE

## 2022-05-17 PROCEDURE — 3075F PR MOST RECENT SYSTOLIC BLOOD PRESS GE 130-139MM HG: ICD-10-PCS | Mod: CPTII,,, | Performed by: INTERNAL MEDICINE

## 2022-05-17 PROCEDURE — 99213 OFFICE O/P EST LOW 20 MIN: CPT | Mod: PBBFAC | Performed by: INTERNAL MEDICINE

## 2022-05-17 PROCEDURE — 3061F PR NEG MICROALBUMINURIA RESULT DOCUMENTED/REVIEW: ICD-10-PCS | Mod: CPTII,,, | Performed by: INTERNAL MEDICINE

## 2022-05-17 PROCEDURE — 4010F ACE/ARB THERAPY RXD/TAKEN: CPT | Mod: CPTII,,, | Performed by: INTERNAL MEDICINE

## 2022-05-17 PROCEDURE — 3044F PR MOST RECENT HEMOGLOBIN A1C LEVEL <7.0%: ICD-10-PCS | Mod: CPTII,,, | Performed by: INTERNAL MEDICINE

## 2022-05-17 PROCEDURE — 3044F HG A1C LEVEL LT 7.0%: CPT | Mod: CPTII,,, | Performed by: INTERNAL MEDICINE

## 2022-05-17 PROCEDURE — 3066F PR DOCUMENTATION OF TREATMENT FOR NEPHROPATHY: ICD-10-PCS | Mod: CPTII,,, | Performed by: INTERNAL MEDICINE

## 2022-05-17 PROCEDURE — 99999 PR PBB SHADOW E&M-EST. PATIENT-LVL III: ICD-10-PCS | Mod: PBBFAC,,, | Performed by: INTERNAL MEDICINE

## 2022-05-17 PROCEDURE — 3075F SYST BP GE 130 - 139MM HG: CPT | Mod: CPTII,,, | Performed by: INTERNAL MEDICINE

## 2022-05-17 PROCEDURE — 4010F PR ACE/ARB THEARPY RXD/TAKEN: ICD-10-PCS | Mod: CPTII,,, | Performed by: INTERNAL MEDICINE

## 2022-05-17 PROCEDURE — 99213 OFFICE O/P EST LOW 20 MIN: CPT | Mod: S$PBB,,, | Performed by: INTERNAL MEDICINE

## 2022-05-17 PROCEDURE — 1159F MED LIST DOCD IN RCRD: CPT | Mod: CPTII,,, | Performed by: INTERNAL MEDICINE

## 2022-05-17 PROCEDURE — 1159F PR MEDICATION LIST DOCUMENTED IN MEDICAL RECORD: ICD-10-PCS | Mod: CPTII,,, | Performed by: INTERNAL MEDICINE

## 2022-05-17 PROCEDURE — 99213 PR OFFICE/OUTPT VISIT, EST, LEVL III, 20-29 MIN: ICD-10-PCS | Mod: S$PBB,,, | Performed by: INTERNAL MEDICINE

## 2022-05-17 PROCEDURE — 99999 PR PBB SHADOW E&M-EST. PATIENT-LVL III: CPT | Mod: PBBFAC,,, | Performed by: INTERNAL MEDICINE

## 2022-05-17 PROCEDURE — 3079F PR MOST RECENT DIASTOLIC BLOOD PRESSURE 80-89 MM HG: ICD-10-PCS | Mod: CPTII,,, | Performed by: INTERNAL MEDICINE

## 2022-05-17 PROCEDURE — 3079F DIAST BP 80-89 MM HG: CPT | Mod: CPTII,,, | Performed by: INTERNAL MEDICINE

## 2022-05-17 PROCEDURE — 3008F BODY MASS INDEX DOCD: CPT | Mod: CPTII,,, | Performed by: INTERNAL MEDICINE

## 2022-05-17 PROCEDURE — 3008F PR BODY MASS INDEX (BMI) DOCUMENTED: ICD-10-PCS | Mod: CPTII,,, | Performed by: INTERNAL MEDICINE

## 2022-05-17 PROCEDURE — 3061F NEG MICROALBUMINURIA REV: CPT | Mod: CPTII,,, | Performed by: INTERNAL MEDICINE

## 2022-05-17 NOTE — PROGRESS NOTES
Subjective:     Wilman Neri is here for evaluation of abnormal LFTs    History of Present Illness:  Wilman Neri is here for eval of abnormal LFTs. He is here with his wife. He denies any liver related complaints.     Duration of abnormality-2020  Medications/OTC/Herbal-Denies  ETOH- 2-3 cans of beer/day for 5-6 yrs, used to drink more in his 20's-late 30's  Metabolic issues- HTN, DM, hyperlipidemia, hyper triglyceridemia  BMI-29    Interval history:  05/17/2022    Return for a 3 month follow-up, since his last visit he has lost 3 lb, had 8 cans of beer.  Made changes in his diet, focusing on high-protein, low-carbohydrate diet, stopped drinking Coke.  Feels great.    FibroScan showed S3 steatosis and F3 fibrosis    Review of Systems   Constitutional: Negative for fatigue and fever.   Gastrointestinal: Negative for abdominal distention, abdominal pain, blood in stool, nausea and vomiting.   Musculoskeletal: Negative for arthralgias and back pain.   Skin: Negative for color change.       Objective:     Physical Exam  Constitutional:       Appearance: Normal appearance.   Eyes:      General: No scleral icterus.     Conjunctiva/sclera: Conjunctivae normal.   Abdominal:      General: Bowel sounds are normal. There is no distension.      Palpations: Abdomen is soft.      Tenderness: There is no abdominal tenderness.   Musculoskeletal:      Right lower leg: No edema.      Left lower leg: No edema.   Skin:     General: Skin is warm and dry.      Coloration: Skin is not jaundiced or pale.   Neurological:      Mental Status: He is alert and oriented to person, place, and time.   Psychiatric:         Thought Content: Thought content normal.         MELD-Na score: 6 at 2/18/2022  2:00 PM  MELD score: 6 at 2/18/2022  2:00 PM  Calculated from:  Serum Creatinine: 1.0 mg/dL at 2/18/2022  2:00 PM  Serum Sodium: 137 mmol/L at 2/18/2022  2:00 PM  Total Bilirubin: 0.5 mg/dL (Using min of 1 mg/dL) at 2/18/2022  2:00  PM  INR(ratio): 1.0 at 2/18/2022  2:00 PM  Age: 51 years    WBC   Date Value Ref Range Status   04/07/2022 9.12 3.90 - 12.70 K/uL Final     Hemoglobin   Date Value Ref Range Status   04/07/2022 14.7 14.0 - 18.0 g/dL Final     Hematocrit   Date Value Ref Range Status   04/07/2022 45.1 40.0 - 54.0 % Final     Platelets   Date Value Ref Range Status   04/07/2022 291 150 - 450 K/uL Final     BUN   Date Value Ref Range Status   04/07/2022 11 6 - 20 mg/dL Final     Creatinine   Date Value Ref Range Status   04/07/2022 0.9 0.5 - 1.4 mg/dL Final     Glucose   Date Value Ref Range Status   04/07/2022 117 (H) 70 - 110 mg/dL Final     Calcium   Date Value Ref Range Status   04/07/2022 9.8 8.7 - 10.5 mg/dL Final     Sodium   Date Value Ref Range Status   04/07/2022 138 136 - 145 mmol/L Final     Potassium   Date Value Ref Range Status   04/07/2022 4.0 3.5 - 5.1 mmol/L Final     Chloride   Date Value Ref Range Status   04/07/2022 103 95 - 110 mmol/L Final     AST   Date Value Ref Range Status   04/07/2022 21 10 - 40 U/L Final     ALT   Date Value Ref Range Status   04/07/2022 45 (H) 10 - 44 U/L Final     Alkaline Phosphatase   Date Value Ref Range Status   04/07/2022 36 (L) 55 - 135 U/L Final     Total Bilirubin   Date Value Ref Range Status   04/07/2022 0.5 0.1 - 1.0 mg/dL Final     Comment:     For infants and newborns, interpretation of results should be based  on gestational age, weight and in agreement with clinical  observations.    Premature Infant recommended reference ranges:  Up to 24 hours.............<8.0 mg/dL  Up to 48 hours............<12.0 mg/dL  3-5 days..................<15.0 mg/dL  6-29 days.................<15.0 mg/dL       Albumin   Date Value Ref Range Status   04/07/2022 4.0 3.5 - 5.2 g/dL Final     INR   Date Value Ref Range Status   02/18/2022 1.0 0.8 - 1.2 Final     Comment:     Coumadin Therapy:  2.0 - 3.0 for INR for all indicators except mechanical heart valves  and antiphospholipid syndromes which  should use 2.5 - 3.5.           Assessment/Plan:     1.Abnormal LFTs:  Suspect hepatic steatosis/hepatitis from  ETOH, underlying fatty liver disease is also suspected  Work up to rule out infectious/autoimmune/genetic disorders of the liver was negative  US abd shows hepatomegaly with steatosis and  fibroscan shows moderate steatosis and fibrosis- repeat ultrasound and FibroScan in 1 year  Discussed harmful  effects of ETOH, there is no known safe level of alcohol consumption for patients with ALD, so recommended to stop all ETOH intake.  Informed a standard drink contains 14 g alcohol (equivalent to 12 oz. beer [5% alcohol], 8-9 oz. malt liquor, 5 oz. table wine, or 1.5 oz. distilled spirits)  Hazardous level of alcohol for men>2 drinks     2. Obesity:  Discussed dietary recommendations- Low calorie, low carbohydrate, high protein diet with goal of loosing >3-5% to improve steatosis and >7-10% to improve histological changes if present  Discussed daily, consistent exercise    3.Metabolic syndrome:   Reduction of risk factors for CVD  Continued treatment for HTN  Optimization of blood glucose control.  Continue lipid-lowering therapy  Increase physical activity     4. Complex hepatic cyst:  2.5 cm in the left hepatic lobe, will repeat ultrasound in 1 year      RTC in 6 mo with preclinic labs    I have reviewed existing labs, imaging. Educated patient about disease process, prognosis. Ordered required labs, images and discussed treatment plan.     Angeles Mejia MD  Transplant Hepatologist  Dept of Hepatology, [place] [facility] [place]

## 2022-06-12 ENCOUNTER — PATIENT MESSAGE (OUTPATIENT)
Dept: OTHER | Facility: OTHER | Age: 52
End: 2022-06-12
Payer: MEDICAID

## 2022-06-15 RX ORDER — MODAFINIL 200 MG/1
200 TABLET ORAL DAILY
Qty: 90 TABLET | Refills: 1 | Status: SHIPPED | OUTPATIENT
Start: 2022-06-15 | End: 2023-04-21 | Stop reason: SDUPTHER

## 2022-06-16 ENCOUNTER — TELEPHONE (OUTPATIENT)
Dept: FAMILY MEDICINE | Facility: CLINIC | Age: 52
End: 2022-06-16
Payer: MEDICAID

## 2022-06-16 NOTE — TELEPHONE ENCOUNTER
S/w  pharmacist, inquiring if pt will continue lunesta since provigil was prescribed, states there are opposite effects. Please advise.     ----- Message from Irene Culver sent at 6/16/2022  2:32 PM CDT -----  Contact: [facility]  States there maybe a drug interaction with the pt  Lunesta and Provigil medication, no additional info given and can be reached at [phone]///[email or web address]

## 2022-06-16 NOTE — TELEPHONE ENCOUNTER
Along with benefits the patient it is okay to continue Lunesta and Provigil, Lunesta is for sleep and Provigil is to keep him alert.  If the patient does not want to take Provigil he does not need to continue it.

## 2022-06-17 NOTE — TELEPHONE ENCOUNTER
Informed pharmacy per Dr. Lazar that it is okay to take Lunesta alongside Provigil and patient is allowed to discontinue Provigil if he wants to. Pharmacy verbalized understanding. Pharmacy had no further questions or concerns.

## 2022-07-05 ENCOUNTER — PATIENT MESSAGE (OUTPATIENT)
Dept: FAMILY MEDICINE | Facility: CLINIC | Age: 52
End: 2022-07-05
Payer: MEDICAID

## 2022-07-14 ENCOUNTER — PATIENT MESSAGE (OUTPATIENT)
Dept: OTHER | Facility: OTHER | Age: 52
End: 2022-07-14
Payer: MEDICAID

## 2022-07-14 ENCOUNTER — LAB VISIT (OUTPATIENT)
Dept: LAB | Facility: HOSPITAL | Age: 52
End: 2022-07-14
Attending: FAMILY MEDICINE
Payer: MEDICAID

## 2022-07-14 DIAGNOSIS — E03.4 HYPOTHYROIDISM DUE TO ACQUIRED ATROPHY OF THYROID: ICD-10-CM

## 2022-07-14 DIAGNOSIS — I10 PRIMARY HYPERTENSION: ICD-10-CM

## 2022-07-14 DIAGNOSIS — E11.65 UNCONTROLLED TYPE 2 DIABETES MELLITUS WITH HYPERGLYCEMIA: ICD-10-CM

## 2022-07-14 LAB
ALBUMIN SERPL BCP-MCNC: 3.8 G/DL (ref 3.5–5.2)
ALP SERPL-CCNC: 39 U/L (ref 55–135)
ALT SERPL W/O P-5'-P-CCNC: 50 U/L (ref 10–44)
ANION GAP SERPL CALC-SCNC: 7 MMOL/L (ref 8–16)
AST SERPL-CCNC: 28 U/L (ref 10–40)
BASOPHILS # BLD AUTO: 0.07 K/UL (ref 0–0.2)
BASOPHILS NFR BLD: 1.1 % (ref 0–1.9)
BILIRUB SERPL-MCNC: 0.5 MG/DL (ref 0.1–1)
BUN SERPL-MCNC: 13 MG/DL (ref 6–20)
CALCIUM SERPL-MCNC: 9.4 MG/DL (ref 8.7–10.5)
CHLORIDE SERPL-SCNC: 104 MMOL/L (ref 95–110)
CHOLEST SERPL-MCNC: 152 MG/DL (ref 120–199)
CHOLEST/HDLC SERPL: 5.6 {RATIO} (ref 2–5)
CO2 SERPL-SCNC: 27 MMOL/L (ref 23–29)
CREAT SERPL-MCNC: 0.9 MG/DL (ref 0.5–1.4)
DIFFERENTIAL METHOD: ABNORMAL
EOSINOPHIL # BLD AUTO: 0.2 K/UL (ref 0–0.5)
EOSINOPHIL NFR BLD: 3.4 % (ref 0–8)
ERYTHROCYTE [DISTWIDTH] IN BLOOD BY AUTOMATED COUNT: 13.2 % (ref 11.5–14.5)
EST. GFR  (AFRICAN AMERICAN): >60 ML/MIN/1.73 M^2
EST. GFR  (NON AFRICAN AMERICAN): >60 ML/MIN/1.73 M^2
ESTIMATED AVG GLUCOSE: 120 MG/DL (ref 68–131)
GLUCOSE SERPL-MCNC: 117 MG/DL (ref 70–110)
HBA1C MFR BLD: 5.8 % (ref 4–5.6)
HCT VFR BLD AUTO: 39.7 % (ref 40–54)
HDLC SERPL-MCNC: 27 MG/DL (ref 40–75)
HDLC SERPL: 17.8 % (ref 20–50)
HGB BLD-MCNC: 13.2 G/DL (ref 14–18)
IMM GRANULOCYTES # BLD AUTO: 0.01 K/UL (ref 0–0.04)
IMM GRANULOCYTES NFR BLD AUTO: 0.2 % (ref 0–0.5)
LDLC SERPL CALC-MCNC: 70.4 MG/DL (ref 63–159)
LYMPHOCYTES # BLD AUTO: 1.9 K/UL (ref 1–4.8)
LYMPHOCYTES NFR BLD: 30.4 % (ref 18–48)
MCH RBC QN AUTO: 28.8 PG (ref 27–31)
MCHC RBC AUTO-ENTMCNC: 33.2 G/DL (ref 32–36)
MCV RBC AUTO: 87 FL (ref 82–98)
MONOCYTES # BLD AUTO: 0.5 K/UL (ref 0.3–1)
MONOCYTES NFR BLD: 8.1 % (ref 4–15)
NEUTROPHILS # BLD AUTO: 3.6 K/UL (ref 1.8–7.7)
NEUTROPHILS NFR BLD: 56.8 % (ref 38–73)
NONHDLC SERPL-MCNC: 125 MG/DL
NRBC BLD-RTO: 0 /100 WBC
PLATELET # BLD AUTO: 237 K/UL (ref 150–450)
PMV BLD AUTO: 9.6 FL (ref 9.2–12.9)
POTASSIUM SERPL-SCNC: 4.5 MMOL/L (ref 3.5–5.1)
PROT SERPL-MCNC: 6.4 G/DL (ref 6–8.4)
RBC # BLD AUTO: 4.59 M/UL (ref 4.6–6.2)
SODIUM SERPL-SCNC: 138 MMOL/L (ref 136–145)
T4 FREE SERPL-MCNC: 1.54 NG/DL (ref 0.71–1.51)
TRIGL SERPL-MCNC: 273 MG/DL (ref 30–150)
TSH SERPL DL<=0.005 MIU/L-ACNC: 0.14 UIU/ML (ref 0.4–4)
WBC # BLD AUTO: 6.26 K/UL (ref 3.9–12.7)

## 2022-07-14 PROCEDURE — 85025 COMPLETE CBC W/AUTO DIFF WBC: CPT | Performed by: FAMILY MEDICINE

## 2022-07-14 PROCEDURE — 83036 HEMOGLOBIN GLYCOSYLATED A1C: CPT | Performed by: FAMILY MEDICINE

## 2022-07-14 PROCEDURE — 80061 LIPID PANEL: CPT | Performed by: FAMILY MEDICINE

## 2022-07-14 PROCEDURE — 36415 COLL VENOUS BLD VENIPUNCTURE: CPT | Mod: PO | Performed by: FAMILY MEDICINE

## 2022-07-14 PROCEDURE — 84439 ASSAY OF FREE THYROXINE: CPT | Performed by: FAMILY MEDICINE

## 2022-07-14 PROCEDURE — 84443 ASSAY THYROID STIM HORMONE: CPT | Performed by: FAMILY MEDICINE

## 2022-07-14 PROCEDURE — 80053 COMPREHEN METABOLIC PANEL: CPT | Performed by: FAMILY MEDICINE

## 2022-07-21 ENCOUNTER — OFFICE VISIT (OUTPATIENT)
Dept: FAMILY MEDICINE | Facility: CLINIC | Age: 52
End: 2022-07-21
Payer: MEDICAID

## 2022-07-21 VITALS
HEART RATE: 110 BPM | SYSTOLIC BLOOD PRESSURE: 132 MMHG | WEIGHT: 214.31 LBS | HEIGHT: 70 IN | OXYGEN SATURATION: 96 % | DIASTOLIC BLOOD PRESSURE: 86 MMHG | TEMPERATURE: 98 F | BODY MASS INDEX: 30.68 KG/M2

## 2022-07-21 DIAGNOSIS — I10 PRIMARY HYPERTENSION: Primary | ICD-10-CM

## 2022-07-21 DIAGNOSIS — E03.4 HYPOTHYROIDISM DUE TO ACQUIRED ATROPHY OF THYROID: ICD-10-CM

## 2022-07-21 DIAGNOSIS — G47.26 CIRCADIAN RHYTHM SLEEP DISORDER, SHIFT WORK TYPE: ICD-10-CM

## 2022-07-21 DIAGNOSIS — E11.9 CONTROLLED TYPE 2 DIABETES MELLITUS WITHOUT COMPLICATION, WITHOUT LONG-TERM CURRENT USE OF INSULIN: ICD-10-CM

## 2022-07-21 DIAGNOSIS — G47.00 INSOMNIA, UNSPECIFIED TYPE: ICD-10-CM

## 2022-07-21 PROCEDURE — 3079F DIAST BP 80-89 MM HG: CPT | Mod: CPTII,,, | Performed by: FAMILY MEDICINE

## 2022-07-21 PROCEDURE — 99214 OFFICE O/P EST MOD 30 MIN: CPT | Mod: PBBFAC,PO | Performed by: FAMILY MEDICINE

## 2022-07-21 PROCEDURE — 99214 PR OFFICE/OUTPT VISIT, EST, LEVL IV, 30-39 MIN: ICD-10-PCS | Mod: S$PBB,,, | Performed by: FAMILY MEDICINE

## 2022-07-21 PROCEDURE — 3066F NEPHROPATHY DOC TX: CPT | Mod: CPTII,,, | Performed by: FAMILY MEDICINE

## 2022-07-21 PROCEDURE — 4010F PR ACE/ARB THEARPY RXD/TAKEN: ICD-10-PCS | Mod: CPTII,,, | Performed by: FAMILY MEDICINE

## 2022-07-21 PROCEDURE — 3061F PR NEG MICROALBUMINURIA RESULT DOCUMENTED/REVIEW: ICD-10-PCS | Mod: CPTII,,, | Performed by: FAMILY MEDICINE

## 2022-07-21 PROCEDURE — 3075F PR MOST RECENT SYSTOLIC BLOOD PRESS GE 130-139MM HG: ICD-10-PCS | Mod: CPTII,,, | Performed by: FAMILY MEDICINE

## 2022-07-21 PROCEDURE — 3061F NEG MICROALBUMINURIA REV: CPT | Mod: CPTII,,, | Performed by: FAMILY MEDICINE

## 2022-07-21 PROCEDURE — 3066F PR DOCUMENTATION OF TREATMENT FOR NEPHROPATHY: ICD-10-PCS | Mod: CPTII,,, | Performed by: FAMILY MEDICINE

## 2022-07-21 PROCEDURE — 4010F ACE/ARB THERAPY RXD/TAKEN: CPT | Mod: CPTII,,, | Performed by: FAMILY MEDICINE

## 2022-07-21 PROCEDURE — 99214 OFFICE O/P EST MOD 30 MIN: CPT | Mod: S$PBB,,, | Performed by: FAMILY MEDICINE

## 2022-07-21 PROCEDURE — 3044F PR MOST RECENT HEMOGLOBIN A1C LEVEL <7.0%: ICD-10-PCS | Mod: CPTII,,, | Performed by: FAMILY MEDICINE

## 2022-07-21 PROCEDURE — 99999 PR PBB SHADOW E&M-EST. PATIENT-LVL IV: CPT | Mod: PBBFAC,,, | Performed by: FAMILY MEDICINE

## 2022-07-21 PROCEDURE — 3044F HG A1C LEVEL LT 7.0%: CPT | Mod: CPTII,,, | Performed by: FAMILY MEDICINE

## 2022-07-21 PROCEDURE — 99999 PR PBB SHADOW E&M-EST. PATIENT-LVL IV: ICD-10-PCS | Mod: PBBFAC,,, | Performed by: FAMILY MEDICINE

## 2022-07-21 PROCEDURE — 3008F BODY MASS INDEX DOCD: CPT | Mod: CPTII,,, | Performed by: FAMILY MEDICINE

## 2022-07-21 PROCEDURE — 3075F SYST BP GE 130 - 139MM HG: CPT | Mod: CPTII,,, | Performed by: FAMILY MEDICINE

## 2022-07-21 PROCEDURE — 3079F PR MOST RECENT DIASTOLIC BLOOD PRESSURE 80-89 MM HG: ICD-10-PCS | Mod: CPTII,,, | Performed by: FAMILY MEDICINE

## 2022-07-21 PROCEDURE — 1159F MED LIST DOCD IN RCRD: CPT | Mod: CPTII,,, | Performed by: FAMILY MEDICINE

## 2022-07-21 PROCEDURE — 1159F PR MEDICATION LIST DOCUMENTED IN MEDICAL RECORD: ICD-10-PCS | Mod: CPTII,,, | Performed by: FAMILY MEDICINE

## 2022-07-21 PROCEDURE — 3008F PR BODY MASS INDEX (BMI) DOCUMENTED: ICD-10-PCS | Mod: CPTII,,, | Performed by: FAMILY MEDICINE

## 2022-07-21 PROCEDURE — 90750 HZV VACC RECOMBINANT IM: CPT | Mod: PBBFAC,PO

## 2022-07-21 NOTE — PROGRESS NOTES
Chief Complaint:    Chief Complaint   Patient presents with    Follow-up       History of Present Illness:  Patient with uncontrolled type 2 DM with hyperglycemia, HTN, circadian rhythm sleep disorder presents today for a three month follow-up      Raising ducks. 3D printing. Staying active.   Lost some weight.   A1C is 5.8, he's only drinking on special occasions now.   Liver enzymes are decreased.   He gave blood at [facility] before he did labs.  Taking provigil as needed now; not currently employed.   He is in digital diabetes and hypertension program .  His numbers run okay  On Lunesta for insomnia          ROS:  Review of Systems   Constitutional: Negative for appetite change, chills and fever.   HENT: Negative for congestion, ear pain, postnasal drip, rhinorrhea, sinus pressure and sinus pain.    Eyes: Negative for pain.   Respiratory: Negative for cough, chest tightness and shortness of breath.    Cardiovascular: Negative for chest pain and palpitations.   Gastrointestinal: Negative for abdominal pain, blood in stool, constipation, diarrhea and nausea.   Genitourinary: Negative for difficulty urinating, dysuria, flank pain and hematuria.   Musculoskeletal: Negative for arthralgias and myalgias.   Skin: Negative for pallor and wound.   Neurological: Negative for dizziness, tremors, speech difficulty, light-headedness and headaches.   Psychiatric/Behavioral: Negative for behavioral problems, dysphoric mood and sleep disturbance. The patient is not nervous/anxious.    All other systems reviewed and are negative.      Past Medical History:   Diagnosis Date    Anxiety     Depression     Diabetes mellitus     Hypertension     Hypothyroid     Sleep disorder     Squamous cell carcinoma of skin 10/2019    right shoulder    Stroke        Social History:  Social History     Socioeconomic History    Marital status:    Tobacco Use    Smoking status: Former Smoker     Packs/day: 1.00     Types: Vaping w/o  "nicotine    Smokeless tobacco: Never Used    Tobacco comment: vape    Substance and Sexual Activity    Alcohol use: No     Comment: couple times a year    Drug use: No    Sexual activity: Yes     Partners: Female     Birth control/protection: None       Family History:   family history includes Cancer in his father and mother; Cataracts in his father and mother; Diabetes in his maternal grandmother; Macular degeneration in his father.    Health Maintenance   Topic Date Due    Hemoglobin A1c  01/14/2023    Eye Exam  03/31/2023    Foot Exam  04/14/2023    Lipid Panel  07/14/2023    High Dose Statin  07/21/2023    TETANUS VACCINE  05/14/2025    Hepatitis C Screening  Completed       Physical Exam:    Vital Signs  Temp: 98.4 °F (36.9 °C)  Temp src: Temporal  Pulse: 110  SpO2: 96 %  BP: 132/86  BP Location: Left arm  Patient Position: Sitting  Height and Weight  Height: 5' 10" (177.8 cm)  Weight: 97.2 kg (214 lb 4.6 oz)  BSA (Calculated - sq m): 2.19 sq meters  BMI (Calculated): 30.7  Weight in (lb) to have BMI = 25: 173.9]    Body mass index is 30.75 kg/m².    Physical Exam  Vitals and nursing note reviewed.   Constitutional:       Appearance: Normal appearance.   HENT:      Head: Normocephalic and atraumatic.      Right Ear: Tympanic membrane normal.      Left Ear: Tympanic membrane normal.   Eyes:      Extraocular Movements: Extraocular movements intact.      Pupils: Pupils are equal, round, and reactive to light.   Cardiovascular:      Rate and Rhythm: Normal rate and regular rhythm.      Pulses: Normal pulses.      Heart sounds: Normal heart sounds. No murmur heard.    No gallop.   Pulmonary:      Effort: Pulmonary effort is normal. No respiratory distress.      Breath sounds: Normal breath sounds. No wheezing, rhonchi or rales.   Abdominal:      General: There is no distension.      Palpations: Abdomen is soft.      Tenderness: There is no abdominal tenderness.   Musculoskeletal:         General: No " swelling, deformity or signs of injury. Normal range of motion.      Cervical back: Normal range of motion.   Skin:     General: Skin is warm and dry.      Capillary Refill: Capillary refill takes less than 2 seconds.      Coloration: Skin is not jaundiced or pale.   Neurological:      General: No focal deficit present.      Mental Status: He is alert and oriented to person, place, and time.   Psychiatric:         Mood and Affect: Mood normal.         Behavior: Behavior normal.           Diabetes Management Status    Statin: Taking  ACE/ARB: Taking    Screening or Prevention Patient's value Goal Complete/Controlled?   HgA1C Testing and Control   Lab Results   Component Value Date    HGBA1C 5.8 (H) 07/14/2022      Annually/Less than 8% Yes   Lipid profile : 07/14/2022 Annually Yes   LDL control Lab Results   Component Value Date    LDLCALC 70.4 07/14/2022    Annually/Less than 100 mg/dl  Yes   Nephropathy screening Lab Results   Component Value Date    LABMICR 5.0 07/14/2022     Lab Results   Component Value Date    PROTEINUA NEG 10/24/2006    Annually No   Blood pressure BP Readings from Last 1 Encounters:   07/21/22 132/86    Less than 140/90 Yes   Dilated retinal exam : 03/31/2022 Annually No   Foot exam   : 04/14/2022 Annually Yes       Assessment:      ICD-10-CM ICD-9-CM   1. Primary hypertension  I10 401.9   2. Controlled type 2 diabetes mellitus without complication, without long-term current use of insulin  E11.9 250.00   3. Hypothyroidism due to acquired atrophy of thyroid  E03.4 244.8     246.8   4. Insomnia, unspecified type  G47.00 780.52   5. Circadian rhythm sleep disorder, shift work type  G47.26 327.36     Plan:  Your A1c is within normal range. Good work and keep it up.  Keep balanced diet.   Continue weight loss. Start physical activity.   Continue to monitor blood pressure, sugar levels at home.  Get shingles vaccination at your pharmacy.  Labs as below  Follow-up 3 months  Orders Placed This  Encounter   Procedures    (In Office Administered) Zoster Recombinant Vaccine    Hemoglobin A1C    Comprehensive Metabolic Panel    CBC Auto Differential    Microalbumin/Creatinine Ratio, Urine    Lipid Panel    TSH       Current Outpatient Medications   Medication Sig Dispense Refill    amLODIPine (NORVASC) 5 MG tablet Take 1 tablet (5 mg total) by mouth once daily. 90 tablet 1    atorvastatin (LIPITOR) 20 MG tablet Take 1 tablet (20 mg total) by mouth once daily. 90 tablet 2    butalbital-acetaminophen-caffeine -40 mg (FIORICET, ESGIC) -40 mg per tablet Take 1 tablet by mouth every 4 (four) hours as needed for Headaches. 30 tablet 1    co-enzyme Q-10 30 mg capsule Take 30 mg by mouth 3 (three) times daily.      eszopiclone (LUNESTA) 2 MG Tab Take 1 tablet by mouth in the evening 90 tablet 0    levothyroxine (SYNTHROID) 200 MCG tablet Take 1 tablet (200 mcg total) by mouth once daily. 90 tablet 2    lisinopriL-hydrochlorothiazide (PRINZIDE,ZESTORETIC) 20-12.5 mg per tablet Take 1 tablet by mouth once daily. 90 tablet 1    MELATONIN ORAL Take by mouth. Patient states that he takes 5 mg daily      modafiniL (PROVIGIL) 200 MG Tab Take 1 tablet (200 mg total) by mouth once daily. 90 tablet 1    paroxetine (PAXIL) 20 MG tablet Take 1 tablet (20 mg total) by mouth once daily. 90 tablet 2    propranoloL (INDERAL LA) 120 MG 24 hr capsule Take 1 capsule (120 mg total) by mouth once daily. 90 capsule 3    sildenafil (VIAGRA) 50 MG tablet Take 1 tablet (50 mg total) by mouth daily as needed for Erectile Dysfunction. 30 tablet 11    SITagliptan-metformin (JANUMET)  mg per tablet Take 1 tablet by mouth 2 (two) times daily with meals. 180 tablet 3     No current facility-administered medications for this visit.       There are no discontinued medications.    Follow up in about 3 months (around 10/21/2022).      Delores Lazar MD  Scribe Attestation:   INirmala, am scribing for, and in  the presence of, Dr.Arif Lazar I performed the above scribed service and the documentation accurately describes the services I performed. I attest to the accuracy of the note.    I, Dr. Delores Lazar, reviewed documentation as scribed above. I performed the services described in this documentation.  I agree that the record reflects my personal performance and is accurate and complete. Delores Lazar MD.  07/21/2022

## 2022-07-28 ENCOUNTER — PATIENT MESSAGE (OUTPATIENT)
Dept: AUDIOLOGY | Facility: CLINIC | Age: 52
End: 2022-07-28
Payer: MEDICAID

## 2022-08-09 ENCOUNTER — CLINICAL SUPPORT (OUTPATIENT)
Dept: AUDIOLOGY | Facility: CLINIC | Age: 52
End: 2022-08-09
Payer: MEDICAID

## 2022-08-09 DIAGNOSIS — Z96.21 STATUS POST PLACEMENT OF BONE ANCHORED HEARING AID (BAHA): Primary | ICD-10-CM

## 2022-08-09 NOTE — PROGRESS NOTES
Wilmanneri Neri was seen 08/09/2022 for a Bone Anchored Hearing Aid fitting.  Patient was fitted to baha sensogram.  Patient was instructed on use and care, and demonstrated proficiency.    Make:     Cochlear   Model:    Baha 6 Max attract Right and Left  Magnet Strength:   n/a  Color:     samirande  Battery:    312  Right SN:    [id number]  Left SN:    [id number]  Repair Warranty:   8/9/24  Loss Warranty:   8/9/24    Accessories:   TV Streamer SN:   n/a  Warranty:        MiniMic SN:   Did not bring  Warranty:    8/9/23    Programs:  P1-everyday  P2-noise  P3-music  P4-outdoor

## 2022-09-22 ENCOUNTER — PATIENT MESSAGE (OUTPATIENT)
Dept: ADMINISTRATIVE | Facility: OTHER | Age: 52
End: 2022-09-22
Payer: MEDICAID

## 2022-09-22 ENCOUNTER — PATIENT MESSAGE (OUTPATIENT)
Dept: PODIATRY | Facility: CLINIC | Age: 52
End: 2022-09-22
Payer: MEDICAID

## 2022-09-22 DIAGNOSIS — M79.674 PAIN OF RIGHT GREAT TOE: Primary | ICD-10-CM

## 2022-09-27 ENCOUNTER — PATIENT MESSAGE (OUTPATIENT)
Dept: ADMINISTRATIVE | Facility: OTHER | Age: 52
End: 2022-09-27
Payer: MEDICAID

## 2022-09-29 ENCOUNTER — HOSPITAL ENCOUNTER (OUTPATIENT)
Dept: RADIOLOGY | Facility: HOSPITAL | Age: 52
Discharge: HOME | End: 2022-09-29
Attending: PODIATRIST
Payer: MEDICAID

## 2022-09-29 DIAGNOSIS — M79.674 PAIN OF RIGHT GREAT TOE: ICD-10-CM

## 2022-09-29 PROCEDURE — 73630 XR FOOT COMPLETE 3 VIEW RIGHT: ICD-10-PCS | Mod: 26,RT,, | Performed by: RADIOLOGY

## 2022-09-29 PROCEDURE — 73630 X-RAY EXAM OF FOOT: CPT | Mod: TC,RT

## 2022-09-29 PROCEDURE — 73630 X-RAY EXAM OF FOOT: CPT | Mod: 26,RT,, | Performed by: RADIOLOGY

## 2023-01-25 ENCOUNTER — PATIENT MESSAGE (OUTPATIENT)
Dept: ADMINISTRATIVE | Facility: HOSPITAL | Age: 53
End: 2023-01-25
Payer: MEDICAID

## 2023-04-11 ENCOUNTER — PATIENT MESSAGE (OUTPATIENT)
Dept: FAMILY MEDICINE | Facility: CLINIC | Age: 53
End: 2023-04-11
Payer: MEDICAID

## 2023-04-11 NOTE — TELEPHONE ENCOUNTER
Ok, to give him a 10 days worth of lunesta until we can see him next Friday ? Or add him on this Friday? We are at 100%   He was last seen in July 2022

## 2023-04-12 RX ORDER — ESZOPICLONE 2 MG/1
2 TABLET, FILM COATED ORAL NIGHTLY
Qty: 10 TABLET | Refills: 0 | Status: SHIPPED | OUTPATIENT
Start: 2023-04-12 | End: 2023-04-21 | Stop reason: SDUPTHER

## 2023-04-19 ENCOUNTER — PATIENT MESSAGE (OUTPATIENT)
Dept: ADMINISTRATIVE | Facility: HOSPITAL | Age: 53
End: 2023-04-19
Payer: MEDICAID

## 2023-04-21 ENCOUNTER — OFFICE VISIT (OUTPATIENT)
Dept: FAMILY MEDICINE | Facility: CLINIC | Age: 53
End: 2023-04-21
Payer: MEDICAID

## 2023-04-21 DIAGNOSIS — I10 PRIMARY HYPERTENSION: ICD-10-CM

## 2023-04-21 DIAGNOSIS — G47.26 CIRCADIAN RHYTHM SLEEP DISORDER, SHIFT WORK TYPE: ICD-10-CM

## 2023-04-21 DIAGNOSIS — E11.65 UNCONTROLLED TYPE 2 DIABETES MELLITUS WITH HYPERGLYCEMIA: Primary | ICD-10-CM

## 2023-04-21 DIAGNOSIS — E78.2 MIXED HYPERLIPIDEMIA: ICD-10-CM

## 2023-04-21 DIAGNOSIS — E11.9 DIABETIC EYE EXAM: ICD-10-CM

## 2023-04-21 DIAGNOSIS — E03.4 HYPOTHYROIDISM DUE TO ACQUIRED ATROPHY OF THYROID: ICD-10-CM

## 2023-04-21 DIAGNOSIS — Z01.00 DIABETIC EYE EXAM: ICD-10-CM

## 2023-04-21 PROCEDURE — 1160F RVW MEDS BY RX/DR IN RCRD: CPT | Mod: CPTII,95,, | Performed by: FAMILY MEDICINE

## 2023-04-21 PROCEDURE — 99214 PR OFFICE/OUTPT VISIT, EST, LEVL IV, 30-39 MIN: ICD-10-PCS | Mod: 95,,, | Performed by: FAMILY MEDICINE

## 2023-04-21 PROCEDURE — 1159F PR MEDICATION LIST DOCUMENTED IN MEDICAL RECORD: ICD-10-PCS | Mod: CPTII,95,, | Performed by: FAMILY MEDICINE

## 2023-04-21 PROCEDURE — 1160F PR REVIEW ALL MEDS BY PRESCRIBER/CLIN PHARMACIST DOCUMENTED: ICD-10-PCS | Mod: CPTII,95,, | Performed by: FAMILY MEDICINE

## 2023-04-21 PROCEDURE — 1159F MED LIST DOCD IN RCRD: CPT | Mod: CPTII,95,, | Performed by: FAMILY MEDICINE

## 2023-04-21 PROCEDURE — 3072F LOW RISK FOR RETINOPATHY: CPT | Mod: CPTII,95,, | Performed by: FAMILY MEDICINE

## 2023-04-21 PROCEDURE — 3072F PR LOW RISK FOR RETINOPATHY: ICD-10-PCS | Mod: CPTII,95,, | Performed by: FAMILY MEDICINE

## 2023-04-21 PROCEDURE — 99214 OFFICE O/P EST MOD 30 MIN: CPT | Mod: 95,,, | Performed by: FAMILY MEDICINE

## 2023-04-21 RX ORDER — PROPRANOLOL HYDROCHLORIDE 120 MG/1
120 CAPSULE, EXTENDED RELEASE ORAL DAILY
Qty: 90 CAPSULE | Refills: 3 | Status: SHIPPED | OUTPATIENT
Start: 2023-04-21

## 2023-04-21 RX ORDER — ESZOPICLONE 2 MG/1
2 TABLET, FILM COATED ORAL NIGHTLY
Qty: 30 TABLET | Refills: 5 | Status: SHIPPED | OUTPATIENT
Start: 2023-04-21 | End: 2023-04-23 | Stop reason: SDUPTHER

## 2023-04-21 RX ORDER — PAROXETINE HYDROCHLORIDE 20 MG/1
20 TABLET, FILM COATED ORAL DAILY
Qty: 90 TABLET | Refills: 3 | Status: SHIPPED | OUTPATIENT
Start: 2023-04-21

## 2023-04-21 RX ORDER — LEVOTHYROXINE SODIUM 200 UG/1
200 TABLET ORAL DAILY
Qty: 90 TABLET | Refills: 3 | Status: SHIPPED | OUTPATIENT
Start: 2023-04-21

## 2023-04-21 RX ORDER — ATORVASTATIN CALCIUM 20 MG/1
20 TABLET, FILM COATED ORAL DAILY
Qty: 90 TABLET | Refills: 1 | Status: SHIPPED | OUTPATIENT
Start: 2023-04-21

## 2023-04-21 RX ORDER — LISINOPRIL AND HYDROCHLOROTHIAZIDE 12.5; 2 MG/1; MG/1
1 TABLET ORAL DAILY
Qty: 90 TABLET | Refills: 1 | Status: SHIPPED | OUTPATIENT
Start: 2023-04-21 | End: 2023-10-09 | Stop reason: SDUPTHER

## 2023-04-21 RX ORDER — BUTALBITAL, ACETAMINOPHEN AND CAFFEINE 50; 325; 40 MG/1; MG/1; MG/1
1 TABLET ORAL EVERY 4 HOURS PRN
Qty: 30 TABLET | Refills: 1 | Status: SHIPPED | OUTPATIENT
Start: 2023-04-21

## 2023-04-21 RX ORDER — MODAFINIL 200 MG/1
200 TABLET ORAL DAILY
Qty: 90 TABLET | Refills: 1 | Status: SHIPPED | OUTPATIENT
Start: 2023-04-21 | End: 2023-10-19 | Stop reason: SDUPTHER

## 2023-04-21 RX ORDER — AMLODIPINE BESYLATE 5 MG/1
5 TABLET ORAL DAILY
Qty: 90 TABLET | Refills: 3 | Status: SHIPPED | OUTPATIENT
Start: 2023-04-21

## 2023-04-21 NOTE — PROGRESS NOTES
Chief Complaint:    No chief complaint on file.      History of Present Illness:  Patient with uncontrolled type 2 DM with hyperglycemia, HTN, circadian rhythm sleep disorder presents today for a three month follow-up      Raising ducks. 3D printing. Staying active.   Lost some weight.   A1C is 5.8, he's only drinking on special occasions now.   Liver enzymes are decreased.   He gave blood at [facility] before he did labs.  Taking provigil as needed now; not currently employed.   He is in digital diabetes and hypertension program .  His numbers run okay  On Lunesta for insomnia          ROS:  Review of Systems   Constitutional:  Negative for activity change, appetite change, chills, fever and unexpected weight change.   HENT:  Negative for congestion, ear pain, hearing loss, postnasal drip, rhinorrhea, sinus pressure, sinus pain and trouble swallowing.    Eyes:  Negative for pain, discharge and visual disturbance.   Respiratory:  Negative for cough, chest tightness, shortness of breath and wheezing.    Cardiovascular:  Negative for chest pain and palpitations.   Gastrointestinal:  Negative for abdominal pain, blood in stool, constipation, diarrhea, nausea and vomiting.   Endocrine: Negative for polydipsia and polyuria.   Genitourinary:  Negative for difficulty urinating, dysuria, flank pain, hematuria and urgency.   Musculoskeletal:  Negative for arthralgias, joint swelling, myalgias and neck pain.   Skin:  Negative for pallor and wound.   Neurological:  Negative for dizziness, tremors, speech difficulty, light-headedness and headaches.   Psychiatric/Behavioral:  Negative for behavioral problems, confusion, dysphoric mood and sleep disturbance. The patient is not nervous/anxious.    All other systems reviewed and are negative.      Past Medical History:   Diagnosis Date    Anxiety     Depression     Diabetes mellitus     Hypertension     Hypothyroid     Sleep disorder     Squamous cell carcinoma of skin 10/2019     right shoulder    Stroke        Social History:  Social History     Socioeconomic History    Marital status:    Tobacco Use    Smoking status: Former     Packs/day: 1.00     Types: Vaping w/o nicotine, Cigarettes    Smokeless tobacco: Never    Tobacco comments:     vape    Substance and Sexual Activity    Alcohol use: No     Comment: couple times a year    Drug use: No    Sexual activity: Yes     Partners: Female     Birth control/protection: None       Family History:   family history includes Cancer in his father and mother; Cataracts in his father and mother; Diabetes in his maternal grandmother; Macular degeneration in his father.    Health Maintenance   Topic Date Due    Hemoglobin A1c  01/14/2023    Eye Exam  03/31/2023    Foot Exam  04/14/2023    Lipid Panel  07/14/2023    Low Dose Statin  02/13/2024    TETANUS VACCINE  05/14/2025    Hepatitis C Screening  Completed       Physical Exam:     ]    There is no height or weight on file to calculate BMI.    Physical Exam  Vitals and nursing note reviewed.   Constitutional:       Appearance: Normal appearance.   HENT:      Head: Normocephalic and atraumatic.      Right Ear: Tympanic membrane normal.      Left Ear: Tympanic membrane normal.   Eyes:      Extraocular Movements: Extraocular movements intact.      Pupils: Pupils are equal, round, and reactive to light.   Cardiovascular:      Rate and Rhythm: Normal rate and regular rhythm.      Pulses: Normal pulses.      Heart sounds: Normal heart sounds. No murmur heard.    No gallop.   Pulmonary:      Effort: Pulmonary effort is normal. No respiratory distress.      Breath sounds: Normal breath sounds. No wheezing, rhonchi or rales.   Abdominal:      General: There is no distension.      Palpations: Abdomen is soft.      Tenderness: There is no abdominal tenderness.   Musculoskeletal:         General: No swelling, deformity or signs of injury. Normal range of motion.      Cervical back: Normal range of motion.    Skin:     General: Skin is warm and dry.      Capillary Refill: Capillary refill takes less than 2 seconds.      Coloration: Skin is not jaundiced or pale.   Neurological:      General: No focal deficit present.      Mental Status: He is alert and oriented to person, place, and time.   Psychiatric:         Mood and Affect: Mood normal.         Behavior: Behavior normal.         Diabetes Management Status    Statin: Taking  ACE/ARB: Taking    Screening or Prevention Patient's value Goal Complete/Controlled?   HgA1C Testing and Control   Lab Results   Component Value Date    HGBA1C 5.8 (H) 07/14/2022      Annually/Less than 8% Yes   Lipid profile : 07/14/2022 Annually Yes   LDL control Lab Results   Component Value Date    LDLCALC 70.4 07/14/2022    Annually/Less than 100 mg/dl  Yes   Nephropathy screening Lab Results   Component Value Date    LABMICR 5.0 07/14/2022     Lab Results   Component Value Date    PROTEINUA NEG 10/24/2006    Annually No   Blood pressure BP Readings from Last 1 Encounters:   07/21/22 132/86    Less than 140/90 Yes   Dilated retinal exam : 03/31/2022 Annually No   Foot exam   : 04/14/2022 Annually Yes       Assessment:      ICD-10-CM ICD-9-CM   1. Uncontrolled type 2 diabetes mellitus with hyperglycemia  E11.65 250.02   2. Hypothyroidism due to acquired atrophy of thyroid  E03.4 244.8     246.8   3. Primary hypertension  I10 401.9   4. Mixed hyperlipidemia  E78.2 272.2   5. Circadian rhythm sleep disorder, shift work type  G47.26 327.36   6. Diabetic eye exam  Z01.00 V72.0    E11.9 250.00     Plan:  Your A1c is within normal range. Good work and keep it up.  Keep balanced diet.   Continue weight loss. Start physical activity.   Continue to monitor blood pressure, sugar levels at home.  Get shingles vaccination at your pharmacy.  Labs as below  Follow-up 6 months  Orders Placed This Encounter   Procedures    CBC Auto Differential    Comprehensive Metabolic Panel    Hemoglobin A1C    Lipid  Panel    TSH    Ambulatory referral/consult to Optometry       Current Outpatient Medications   Medication Sig Dispense Refill    amLODIPine (NORVASC) 5 MG tablet Take 1 tablet by mouth once daily 90 tablet 3    atorvastatin (LIPITOR) 20 MG tablet Take 1 tablet by mouth once daily 90 tablet 1    atorvastatin (LIPITOR) 20 MG tablet Take 1 tablet (20 mg total) by mouth once daily. 90 tablet 2    butalbital-acetaminophen-caffeine -40 mg (FIORICET, ESGIC) -40 mg per tablet Take 1 tablet by mouth every 4 (four) hours as needed for Headaches. 30 tablet 1    co-enzyme Q-10 30 mg capsule Take 30 mg by mouth 3 (three) times daily.      eszopiclone (LUNESTA) 2 MG Tab Take 1 tablet (2 mg total) by mouth every evening. 10 tablet 0    levothyroxine (SYNTHROID) 200 MCG tablet Take 1 tablet by mouth once daily 90 tablet 3    lisinopriL-hydrochlorothiazide (PRINZIDE,ZESTORETIC) 20-12.5 mg per tablet Take 1 tablet by mouth once daily. 90 tablet 1    MELATONIN ORAL Take by mouth. Patient states that he takes 5 mg daily      modafiniL (PROVIGIL) 200 MG Tab Take 1 tablet (200 mg total) by mouth once daily. 90 tablet 1    paroxetine (PAXIL) 20 MG tablet Take 1 tablet by mouth once daily 90 tablet 3    propranoloL (INDERAL LA) 120 MG 24 hr capsule Take 1 capsule (120 mg total) by mouth once daily. 90 capsule 3    sildenafil (VIAGRA) 50 MG tablet Take 1 tablet (50 mg total) by mouth daily as needed for Erectile Dysfunction. 30 tablet 11    SITagliptan-metformin (JANUMET)  mg per tablet Take 1 tablet by mouth 2 (two) times daily with meals. 180 tablet 3     No current facility-administered medications for this visit.       There are no discontinued medications.    No follow-ups on file.      MD Adriana Wong Attestation:

## 2023-04-22 ENCOUNTER — PATIENT MESSAGE (OUTPATIENT)
Dept: ADMINISTRATIVE | Facility: HOSPITAL | Age: 53
End: 2023-04-22
Payer: MEDICAID

## 2023-04-22 DIAGNOSIS — E11.9 TYPE 2 DIABETES MELLITUS WITHOUT COMPLICATION, UNSPECIFIED WHETHER LONG TERM INSULIN USE: ICD-10-CM

## 2023-04-24 ENCOUNTER — PATIENT MESSAGE (OUTPATIENT)
Dept: FAMILY MEDICINE | Facility: CLINIC | Age: 53
End: 2023-04-24
Payer: MEDICAID

## 2023-06-02 ENCOUNTER — OFFICE VISIT (OUTPATIENT)
Dept: OPHTHALMOLOGY | Facility: CLINIC | Age: 53
End: 2023-06-02
Payer: MEDICAID

## 2023-06-02 ENCOUNTER — PATIENT MESSAGE (OUTPATIENT)
Dept: OPHTHALMOLOGY | Facility: CLINIC | Age: 53
End: 2023-06-02

## 2023-06-02 DIAGNOSIS — E11.9 DIABETES MELLITUS TYPE 2 WITHOUT RETINOPATHY: Primary | ICD-10-CM

## 2023-06-02 DIAGNOSIS — Z01.00 DIABETIC EYE EXAM: ICD-10-CM

## 2023-06-02 DIAGNOSIS — E11.9 DIABETIC EYE EXAM: ICD-10-CM

## 2023-06-02 DIAGNOSIS — H52.7 REFRACTIVE ERRORS: ICD-10-CM

## 2023-06-02 PROCEDURE — 1159F MED LIST DOCD IN RCRD: CPT | Mod: CPTII,,, | Performed by: OPTOMETRIST

## 2023-06-02 PROCEDURE — 1159F PR MEDICATION LIST DOCUMENTED IN MEDICAL RECORD: ICD-10-PCS | Mod: CPTII,,, | Performed by: OPTOMETRIST

## 2023-06-02 PROCEDURE — 4010F PR ACE/ARB THEARPY RXD/TAKEN: ICD-10-PCS | Mod: CPTII,,, | Performed by: OPTOMETRIST

## 2023-06-02 PROCEDURE — 4010F ACE/ARB THERAPY RXD/TAKEN: CPT | Mod: CPTII,,, | Performed by: OPTOMETRIST

## 2023-06-02 PROCEDURE — 2023F PR DILATED RETINAL EXAM W/O EVID OF RETINOPATHY: ICD-10-PCS | Mod: CPTII,,, | Performed by: OPTOMETRIST

## 2023-06-02 PROCEDURE — 2023F DILAT RTA XM W/O RTNOPTHY: CPT | Mod: CPTII,,, | Performed by: OPTOMETRIST

## 2023-06-02 PROCEDURE — 92015 DETERMINE REFRACTIVE STATE: CPT | Mod: ,,, | Performed by: OPTOMETRIST

## 2023-06-02 PROCEDURE — 99213 OFFICE O/P EST LOW 20 MIN: CPT | Mod: PBBFAC | Performed by: OPTOMETRIST

## 2023-06-02 PROCEDURE — 99999 PR PBB SHADOW E&M-EST. PATIENT-LVL III: ICD-10-PCS | Mod: PBBFAC,,, | Performed by: OPTOMETRIST

## 2023-06-02 PROCEDURE — 92014 PR EYE EXAM, EST PATIENT,COMPREHESV: ICD-10-PCS | Mod: S$PBB,,, | Performed by: OPTOMETRIST

## 2023-06-02 PROCEDURE — 92014 COMPRE OPH EXAM EST PT 1/>: CPT | Mod: S$PBB,,, | Performed by: OPTOMETRIST

## 2023-06-02 PROCEDURE — 92015 PR REFRACTION: ICD-10-PCS | Mod: ,,, | Performed by: OPTOMETRIST

## 2023-06-02 PROCEDURE — 99999 PR PBB SHADOW E&M-EST. PATIENT-LVL III: CPT | Mod: PBBFAC,,, | Performed by: OPTOMETRIST

## 2023-06-02 NOTE — PROGRESS NOTES
HPI    NIDDM exam  Right eye hurting x 3 weeks  Pain scale 4 today 4-6 yesterday.  Patient not sure if he was hit in eye when cutting the grass .  Last eye exam 03/31/2022 TRF.  Update glasses RX.  Lab Results       Component                Value               Date                       HGBA1C                   5.8 (H)             07/14/2022              Last edited by Raymond Carney, OD on 6/2/2023  1:25 PM.            Assessment /Plan     For exam results, see Encounter Report.    Diabetes mellitus type 2 without retinopathy    Diabetic eye exam  -     Ambulatory referral/consult to Optometry    Refractive errors      No Background Diabetic Retinopathy  Strict BG control, f/u w/ PCP, and annual DFE  Stressed importance of DM control to preserve vision    Dispense Final Rx for glasses.  RTC 1 year  Discussed above and answered questions.

## 2023-06-09 LAB
COMMENTS: ABNORMAL
EST. AVERAGE GLUCOSE BLD GHB EST-MCNC: 137 MG/DL
HBA1C MFR BLD: 6.4 % (ref 4.8–5.6)

## 2023-06-12 ENCOUNTER — PATIENT MESSAGE (OUTPATIENT)
Dept: FAMILY MEDICINE | Facility: CLINIC | Age: 53
End: 2023-06-12
Payer: MEDICAID

## 2023-06-12 ENCOUNTER — TELEPHONE (OUTPATIENT)
Dept: FAMILY MEDICINE | Facility: CLINIC | Age: 53
End: 2023-06-12
Payer: MEDICAID

## 2023-07-22 DIAGNOSIS — G47.00 INSOMNIA, UNSPECIFIED TYPE: Primary | ICD-10-CM

## 2023-07-24 DIAGNOSIS — G47.00 INSOMNIA, UNSPECIFIED TYPE: Primary | ICD-10-CM

## 2023-07-24 RX ORDER — ESZOPICLONE 2 MG/1
2 TABLET, FILM COATED ORAL NIGHTLY
Qty: 90 TABLET | Refills: 0 | OUTPATIENT
Start: 2023-07-24

## 2023-07-24 RX ORDER — ESZOPICLONE 2 MG/1
TABLET, FILM COATED ORAL
Qty: 90 TABLET | Refills: 0 | Status: SHIPPED | OUTPATIENT
Start: 2023-07-24 | End: 2023-10-19 | Stop reason: SDUPTHER

## 2023-07-24 NOTE — TELEPHONE ENCOUNTER
Care Due:                  Date            Visit Type   Department     Provider  --------------------------------------------------------------------------------                                ESTABLISHED                              PATIENT -    [facility]  Last Visit: 04-      VIRTUAL      MEDICINE       Delores Lazar  Next Visit: None Scheduled  None         None Found                                                            Last  Test          Frequency    Reason                     Performed    Due Date  --------------------------------------------------------------------------------    CMP.........  12 months..  SITagliptan-metformin,     07-   07-                             atorvastatin,                             lisinopriL-hydrochlorothi                             azide....................    Lipid Panel.  12 months..  atorvastatin.............  07-   07-    TSH.........  12 months..  levothyroxine............  07-   07-    [facility] Embedded Care Due Messages. Reference number: [id number].   7/24/2023 8:58:02 AM CDT

## 2023-07-24 NOTE — TELEPHONE ENCOUNTER
No care due was identified.  [facility] Embedded Care Due Messages. Reference number: [id number].   7/24/2023 11:21:31 AM CDT

## 2023-10-08 RX ORDER — LISINOPRIL AND HYDROCHLOROTHIAZIDE 12.5; 2 MG/1; MG/1
1 TABLET ORAL DAILY
Qty: 90 TABLET | Refills: 0 | OUTPATIENT
Start: 2023-10-08

## 2023-10-08 NOTE — TELEPHONE ENCOUNTER
Refill Routing Note   Medication(s) are not appropriate for processing by [facility] for the following reason(s):      Required labs outdated  Required vitals outdated    [facility] action(s):  Defer Care Due:  None identified            Appointments  past 12m or future 3m with PCP    Date Provider   Last Visit   4/21/2023 Delores Lazar MD   Next Visit   Visit date not found Delores Lazar MD   ED visits in past 90 days: 0        Note composed:2:51 PM 10/08/2023

## 2023-10-08 NOTE — TELEPHONE ENCOUNTER
Please fill 20 dys supply of medication and make a follow up appt.If pt fails to make the appt medications will not be refilled past 20 days.

## 2023-10-08 NOTE — TELEPHONE ENCOUNTER
No care due was identified.  [facility] Embedded Care Due Messages. Reference number: [id number].   10/08/2023 11:20:09 AM CDT

## 2023-10-09 RX ORDER — LISINOPRIL AND HYDROCHLOROTHIAZIDE 12.5; 2 MG/1; MG/1
1 TABLET ORAL DAILY
Qty: 20 TABLET | Refills: 0 | Status: SHIPPED | OUTPATIENT
Start: 2023-10-09 | End: 2023-10-19 | Stop reason: SDUPTHER

## 2023-10-16 ENCOUNTER — PATIENT MESSAGE (OUTPATIENT)
Dept: FAMILY MEDICINE | Facility: CLINIC | Age: 53
End: 2023-10-16
Payer: MEDICAID

## 2023-10-19 ENCOUNTER — OFFICE VISIT (OUTPATIENT)
Dept: FAMILY MEDICINE | Facility: CLINIC | Age: 53
End: 2023-10-19
Payer: MEDICAID

## 2023-10-19 DIAGNOSIS — G47.26 CIRCADIAN RHYTHM SLEEP DISORDER, SHIFT WORK TYPE: ICD-10-CM

## 2023-10-19 DIAGNOSIS — Z12.5 SCREENING FOR PROSTATE CANCER: ICD-10-CM

## 2023-10-19 DIAGNOSIS — E03.4 HYPOTHYROIDISM DUE TO ACQUIRED ATROPHY OF THYROID: ICD-10-CM

## 2023-10-19 DIAGNOSIS — E11.65 UNCONTROLLED TYPE 2 DIABETES MELLITUS WITH HYPERGLYCEMIA: Primary | ICD-10-CM

## 2023-10-19 DIAGNOSIS — G47.00 INSOMNIA, UNSPECIFIED TYPE: ICD-10-CM

## 2023-10-19 DIAGNOSIS — I10 PRIMARY HYPERTENSION: ICD-10-CM

## 2023-10-19 PROCEDURE — 99214 PR OFFICE/OUTPT VISIT, EST, LEVL IV, 30-39 MIN: ICD-10-PCS | Mod: 95,,, | Performed by: FAMILY MEDICINE

## 2023-10-19 PROCEDURE — 99214 OFFICE O/P EST MOD 30 MIN: CPT | Mod: 95,,, | Performed by: FAMILY MEDICINE

## 2023-10-19 PROCEDURE — 1160F PR REVIEW ALL MEDS BY PRESCRIBER/CLIN PHARMACIST DOCUMENTED: ICD-10-PCS | Mod: CPTII,95,, | Performed by: FAMILY MEDICINE

## 2023-10-19 PROCEDURE — 1160F RVW MEDS BY RX/DR IN RCRD: CPT | Mod: CPTII,95,, | Performed by: FAMILY MEDICINE

## 2023-10-19 PROCEDURE — 3044F HG A1C LEVEL LT 7.0%: CPT | Mod: CPTII,95,, | Performed by: FAMILY MEDICINE

## 2023-10-19 PROCEDURE — 1159F MED LIST DOCD IN RCRD: CPT | Mod: CPTII,95,, | Performed by: FAMILY MEDICINE

## 2023-10-19 PROCEDURE — 4010F PR ACE/ARB THEARPY RXD/TAKEN: ICD-10-PCS | Mod: CPTII,95,, | Performed by: FAMILY MEDICINE

## 2023-10-19 PROCEDURE — 3044F PR MOST RECENT HEMOGLOBIN A1C LEVEL <7.0%: ICD-10-PCS | Mod: CPTII,95,, | Performed by: FAMILY MEDICINE

## 2023-10-19 PROCEDURE — 1159F PR MEDICATION LIST DOCUMENTED IN MEDICAL RECORD: ICD-10-PCS | Mod: CPTII,95,, | Performed by: FAMILY MEDICINE

## 2023-10-19 PROCEDURE — 4010F ACE/ARB THERAPY RXD/TAKEN: CPT | Mod: CPTII,95,, | Performed by: FAMILY MEDICINE

## 2023-10-19 RX ORDER — MODAFINIL 200 MG/1
200 TABLET ORAL DAILY
Qty: 90 TABLET | Refills: 1 | Status: SHIPPED | OUTPATIENT
Start: 2023-10-19

## 2023-10-19 RX ORDER — LISINOPRIL AND HYDROCHLOROTHIAZIDE 12.5; 2 MG/1; MG/1
1 TABLET ORAL DAILY
Qty: 90 TABLET | Refills: 3 | Status: SHIPPED | OUTPATIENT
Start: 2023-10-19

## 2023-10-19 RX ORDER — ESZOPICLONE 2 MG/1
2 TABLET, FILM COATED ORAL NIGHTLY
Qty: 90 TABLET | Refills: 1 | Status: SHIPPED | OUTPATIENT
Start: 2023-10-19

## 2023-10-19 NOTE — PROGRESS NOTES
Chief Complaint:    No chief complaint on file.      History of Present Illness:  Patient with uncontrolled type 2 DM with hyperglycemia, HTN, circadian rhythm sleep disorder presents today virtually  for med refill,    Doing good. Weight stable. S    Due for labs.     Only drinks a beer on special occasions.     Taking provigil as needed now shift work/circadian rhythm    Blood pressure at home stable, watching diet.     On Lunesta for insomnia.        ROS:  Review of Systems   Constitutional:  Negative for activity change and unexpected weight change.   HENT:  Negative for hearing loss, rhinorrhea and trouble swallowing.    Eyes:  Negative for discharge and visual disturbance.   Respiratory:  Negative for chest tightness and wheezing.    Cardiovascular:  Negative for chest pain and palpitations.   Gastrointestinal:  Negative for blood in stool, constipation, diarrhea and vomiting.   Endocrine: Negative for polydipsia and polyuria.   Genitourinary:  Negative for difficulty urinating, hematuria and urgency.   Musculoskeletal:  Negative for arthralgias, joint swelling and neck pain.   Neurological:  Negative for weakness and headaches.   Psychiatric/Behavioral:  Negative for confusion and dysphoric mood.        Past Medical History:   Diagnosis Date    Anxiety     Depression     Diabetes mellitus 06/2017    BS doesn't check 06/02/2023    Hypertension     Hypothyroid     Sleep disorder     Squamous cell carcinoma of skin 10/2019    right shoulder    Stroke        Social History:  Social History     Socioeconomic History    Marital status:    Tobacco Use    Smoking status: Former     Current packs/day: 1.00     Types: Vaping w/o nicotine, Cigarettes    Smokeless tobacco: Never    Tobacco comments:     vape    Substance and Sexual Activity    Alcohol use: No     Comment: couple times a year    Drug use: No    Sexual activity: Yes     Partners: Female     Birth control/protection: None       Family History:    family history includes Cancer in his father and mother; Cataracts in his father and mother; Diabetes in his maternal grandmother; Macular degeneration in his father.    Health Maintenance   Topic Date Due    Foot Exam  04/14/2023    Lipid Panel  07/14/2023    Hemoglobin A1c  11/28/2023    Low Dose Statin  06/02/2024    Eye Exam  06/02/2024    TETANUS VACCINE  05/14/2025    Colorectal Cancer Screening  03/07/2029    Hepatitis C Screening  Completed    Shingles Vaccine  Completed       Physical Exam:     ]    There is no height or weight on file to calculate BMI.    Physical Exam      Diabetes Management Status    Statin: Taking  ACE/ARB: Taking    Screening or Prevention Patient's value Goal Complete/Controlled?   HgA1C Testing and Control   Lab Results   Component Value Date    HGBA1C 5.8 (H) 07/14/2022      Annually/Less than 8% Yes   Lipid profile : 07/14/2022 Annually Yes   LDL control Lab Results   Component Value Date    LDLCALC 70.4 07/14/2022    Annually/Less than 100 mg/dl  Yes   Nephropathy screening Lab Results   Component Value Date    LABMICR 5.0 07/14/2022     Lab Results   Component Value Date    PROTEINUA NEG 10/24/2006    Annually No   Blood pressure BP Readings from Last 1 Encounters:   07/21/22 132/86    Less than 140/90 Yes   Dilated retinal exam : 06/02/2023 Annually No   Foot exam   : 04/14/2022 Annually Yes       Assessment:      ICD-10-CM ICD-9-CM   1. Uncontrolled type 2 diabetes mellitus with hyperglycemia  E11.65 250.02   2. Primary hypertension  I10 401.9   3. Hypothyroidism due to acquired atrophy of thyroid  E03.4 244.8     246.8   4. Insomnia, unspecified type  G47.00 780.52   5. Screening for prostate cancer  Z12.5 V76.44   6. Circadian rhythm sleep disorder, shift work type  G47.26 327.36       Plan:  Continue current meds and plan.   check, Lunesta and Provigil refill.  Prescriptions sent to pharmacy.  Continue to monitor blood pressure, sugar levels at home.  Labs as below.  Check PSA  Follow-up 6 months.    The patient location is: Home   The chief complaint leading to consultation is: as below  Visit type: Virtual visit with synchronous audio and video  Total time spent with patient: 15+ mins  Each patient to whom he or she provides medical services by telemedicine is:  (1) informed of the relationship between the physician and patient and the respective role of any other health care provider with respect to management of the patient; and (2) notified that he or she may decline to receive medical services by telemedicine and may withdraw from such care at any time.    Notes: see below   Orders Placed This Encounter   Procedures    CBC Auto Differential    Comprehensive Metabolic Panel    Hemoglobin A1C    Lipid Panel    PSA, Screening    TSH       Current Outpatient Medications   Medication Sig Dispense Refill    amLODIPine (NORVASC) 5 MG tablet Take 1 tablet (5 mg total) by mouth once daily. 90 tablet 3    atorvastatin (LIPITOR) 20 MG tablet Take 1 tablet (20 mg total) by mouth once daily. 90 tablet 2    atorvastatin (LIPITOR) 20 MG tablet Take 1 tablet (20 mg total) by mouth once daily. 90 tablet 1    butalbital-acetaminophen-caffeine -40 mg (FIORICET, ESGIC) -40 mg per tablet Take 1 tablet by mouth every 4 (four) hours as needed for Headaches. 30 tablet 1    co-enzyme Q-10 30 mg capsule Take 30 mg by mouth 3 (three) times daily.      eszopiclone (LUNESTA) 2 MG Tab Take 1 tablet (2 mg total) by mouth every evening. 30 tablet 5    eszopiclone (LUNESTA) 2 MG Tab Take 1 tablet (2 mg total) by mouth every evening. 90 tablet 1    levothyroxine (SYNTHROID) 200 MCG tablet Take 1 tablet (200 mcg total) by mouth once daily. 90 tablet 3    lisinopriL-hydrochlorothiazide (PRINZIDE,ZESTORETIC) 20-12.5 mg per tablet Take 1 tablet by mouth once daily. 90 tablet 3    MELATONIN ORAL Take by mouth. Patient states that he takes 5 mg daily      modafiniL (PROVIGIL) 200 MG Tab Take 1 tablet  (200 mg total) by mouth once daily. 90 tablet 1    paroxetine (PAXIL) 20 MG tablet Take 1 tablet (20 mg total) by mouth once daily. 90 tablet 3    propranoloL (INDERAL LA) 120 MG 24 hr capsule Take 1 capsule (120 mg total) by mouth once daily. 90 capsule 3    sildenafil (VIAGRA) 50 MG tablet Take 1 tablet (50 mg total) by mouth daily as needed for Erectile Dysfunction. 30 tablet 11    SITagliptan-metformin (JANUMET)  mg per tablet Take 1 tablet by mouth 2 (two) times daily with meals. 180 tablet 3     No current facility-administered medications for this visit.       Medications Discontinued During This Encounter   Medication Reason    modafiniL (PROVIGIL) 200 MG Tab Reorder    eszopiclone (LUNESTA) 2 MG Tab Reorder    lisinopriL-hydrochlorothiazide (PRINZIDE,ZESTORETIC) 20-12.5 mg per tablet Reorder       Follow up in about 6 months (around 4/19/2024).      Delores Lazar MD    Scribe Attestation:   I, Gonzalo Enamorado, am scribing for, and in the presence of, Dr.Arif Lazar I performed the above scribed service and the documentation accurately describes the services I performed. I attest to the accuracy of the note.    I, Dr. Delores Lazar, reviewed documentation as scribed above. I performed the services described in this documentation.  I agree that the record reflects my personal performance and is accurate and complete. Delores Lazar MD.  10/19/2023

## 2023-12-20 DIAGNOSIS — E11.9 TYPE 2 DIABETES MELLITUS WITHOUT COMPLICATION: ICD-10-CM

## 2024-01-22 DIAGNOSIS — G47.00 INSOMNIA, UNSPECIFIED TYPE: ICD-10-CM

## 2024-01-22 RX ORDER — ESZOPICLONE 2 MG/1
2 TABLET, FILM COATED ORAL NIGHTLY
Qty: 90 TABLET | Refills: 1 | Status: CANCELLED | OUTPATIENT
Start: 2024-01-22

## 2024-01-22 NOTE — TELEPHONE ENCOUNTER
Care Due:                  Date            Visit Type   Department     Provider  --------------------------------------------------------------------------------                                ESTABLISHED                              PATIENT -    [facility]  Last Visit: 10-      VIRTUAL      MEDICINE       Delores  Lazar  Next Visit: None Scheduled  None         None Found                                                            Last  Test          Frequency    Reason                     Performed    Due Date  --------------------------------------------------------------------------------    CMP.........  12 months..  SITagliptan-metformin,     07-   07-                             atorvastatin,                             lisinopriL-hydrochlorothi                             azide....................    HBA1C.......  6 months...  SITagliptan-metformin....  06- 12-    Lipid Panel.  12 months..  atorvastatin.............  07-   07-    TSH.........  12 months..  levothyroxine............  07-   07-    [employer] Embedded Care Due Messages. Reference number: [id number].   1/22/2024 9:09:35 AM CST

## 2024-02-26 ENCOUNTER — PATIENT MESSAGE (OUTPATIENT)
Dept: ADMINISTRATIVE | Facility: HOSPITAL | Age: 54
End: 2024-02-26
Payer: MEDICAID

## 2024-03-07 ENCOUNTER — OFFICE VISIT (OUTPATIENT)
Dept: FAMILY MEDICINE | Facility: CLINIC | Age: 54
End: 2024-03-07
Payer: MEDICAID

## 2024-03-07 ENCOUNTER — LAB VISIT (OUTPATIENT)
Dept: LAB | Facility: HOSPITAL | Age: 54
End: 2024-03-07
Attending: FAMILY MEDICINE
Payer: MEDICAID

## 2024-03-07 VITALS
HEART RATE: 74 BPM | SYSTOLIC BLOOD PRESSURE: 126 MMHG | BODY MASS INDEX: 32.28 KG/M2 | WEIGHT: 225 LBS | DIASTOLIC BLOOD PRESSURE: 82 MMHG | OXYGEN SATURATION: 99 %

## 2024-03-07 DIAGNOSIS — E03.4 HYPOTHYROIDISM DUE TO ACQUIRED ATROPHY OF THYROID: ICD-10-CM

## 2024-03-07 DIAGNOSIS — E11.65 UNCONTROLLED TYPE 2 DIABETES MELLITUS WITH HYPERGLYCEMIA: ICD-10-CM

## 2024-03-07 DIAGNOSIS — G47.00 INSOMNIA, UNSPECIFIED TYPE: ICD-10-CM

## 2024-03-07 DIAGNOSIS — Z12.5 PROSTATE CANCER SCREENING ENCOUNTER, OPTIONS AND RISKS DISCUSSED: ICD-10-CM

## 2024-03-07 DIAGNOSIS — M75.81 ROTATOR CUFF TENDINITIS, RIGHT: Primary | ICD-10-CM

## 2024-03-07 DIAGNOSIS — I10 PRIMARY HYPERTENSION: ICD-10-CM

## 2024-03-07 DIAGNOSIS — E11.9 TYPE 2 DIABETES MELLITUS WITHOUT COMPLICATION: ICD-10-CM

## 2024-03-07 DIAGNOSIS — E78.2 MIXED HYPERLIPIDEMIA: ICD-10-CM

## 2024-03-07 LAB
ALBUMIN SERPL BCP-MCNC: 4.2 G/DL (ref 3.5–5.2)
ALBUMIN SERPL BCP-MCNC: 4.2 G/DL (ref 3.5–5.2)
ALP SERPL-CCNC: 44 U/L (ref 55–135)
ALP SERPL-CCNC: 44 U/L (ref 55–135)
ALT SERPL W/O P-5'-P-CCNC: 68 U/L (ref 10–44)
ALT SERPL W/O P-5'-P-CCNC: 68 U/L (ref 10–44)
ANION GAP SERPL CALC-SCNC: 12 MMOL/L (ref 8–16)
ANION GAP SERPL CALC-SCNC: 12 MMOL/L (ref 8–16)
AST SERPL-CCNC: 36 U/L (ref 10–40)
AST SERPL-CCNC: 36 U/L (ref 10–40)
BILIRUB SERPL-MCNC: 0.5 MG/DL (ref 0.1–1)
BILIRUB SERPL-MCNC: 0.5 MG/DL (ref 0.1–1)
BUN SERPL-MCNC: 18 MG/DL (ref 6–20)
BUN SERPL-MCNC: 18 MG/DL (ref 6–20)
CALCIUM SERPL-MCNC: 10 MG/DL (ref 8.7–10.5)
CALCIUM SERPL-MCNC: 10 MG/DL (ref 8.7–10.5)
CHLORIDE SERPL-SCNC: 102 MMOL/L (ref 95–110)
CHLORIDE SERPL-SCNC: 102 MMOL/L (ref 95–110)
CHOLEST SERPL-MCNC: 191 MG/DL (ref 120–199)
CHOLEST/HDLC SERPL: 5.6 {RATIO} (ref 2–5)
CO2 SERPL-SCNC: 22 MMOL/L (ref 23–29)
CO2 SERPL-SCNC: 22 MMOL/L (ref 23–29)
COMPLEXED PSA SERPL-MCNC: 0.28 NG/ML (ref 0–4)
CREAT SERPL-MCNC: 1.1 MG/DL (ref 0.5–1.4)
CREAT SERPL-MCNC: 1.1 MG/DL (ref 0.5–1.4)
EST. GFR  (NO RACE VARIABLE): >60 ML/MIN/1.73 M^2
EST. GFR  (NO RACE VARIABLE): >60 ML/MIN/1.73 M^2
ESTIMATED AVG GLUCOSE: 137 MG/DL (ref 68–131)
ESTIMATED AVG GLUCOSE: 137 MG/DL (ref 68–131)
GLUCOSE SERPL-MCNC: 120 MG/DL (ref 70–110)
GLUCOSE SERPL-MCNC: 120 MG/DL (ref 70–110)
HBA1C MFR BLD: 6.4 % (ref 4–5.6)
HBA1C MFR BLD: 6.4 % (ref 4–5.6)
HDLC SERPL-MCNC: 34 MG/DL (ref 40–75)
HDLC SERPL: 17.8 % (ref 20–50)
LDLC SERPL CALC-MCNC: 95.8 MG/DL (ref 63–159)
NONHDLC SERPL-MCNC: 157 MG/DL
POTASSIUM SERPL-SCNC: 4.4 MMOL/L (ref 3.5–5.1)
POTASSIUM SERPL-SCNC: 4.4 MMOL/L (ref 3.5–5.1)
PROT SERPL-MCNC: 7.4 G/DL (ref 6–8.4)
PROT SERPL-MCNC: 7.4 G/DL (ref 6–8.4)
SODIUM SERPL-SCNC: 136 MMOL/L (ref 136–145)
SODIUM SERPL-SCNC: 136 MMOL/L (ref 136–145)
T4 FREE SERPL-MCNC: 1.44 NG/DL (ref 0.71–1.51)
TRIGL SERPL-MCNC: 306 MG/DL (ref 30–150)
TSH SERPL DL<=0.005 MIU/L-ACNC: 0.22 UIU/ML (ref 0.4–4)
TSH SERPL DL<=0.005 MIU/L-ACNC: 0.22 UIU/ML (ref 0.4–4)

## 2024-03-07 PROCEDURE — 4010F ACE/ARB THERAPY RXD/TAKEN: CPT | Mod: CPTII,,, | Performed by: FAMILY MEDICINE

## 2024-03-07 PROCEDURE — 3074F SYST BP LT 130 MM HG: CPT | Mod: CPTII,,, | Performed by: FAMILY MEDICINE

## 2024-03-07 PROCEDURE — 99999PBSHW PR PBB SHADOW TECHNICAL ONLY FILED TO HB: Mod: PBBFAC,,,

## 2024-03-07 PROCEDURE — 20605 DRAIN/INJ JOINT/BURSA W/O US: CPT | Mod: S$PBB,RT,, | Performed by: FAMILY MEDICINE

## 2024-03-07 PROCEDURE — 20605 DRAIN/INJ JOINT/BURSA W/O US: CPT | Mod: PBBFAC,PO | Performed by: FAMILY MEDICINE

## 2024-03-07 PROCEDURE — 84443 ASSAY THYROID STIM HORMONE: CPT | Performed by: FAMILY MEDICINE

## 2024-03-07 PROCEDURE — 84439 ASSAY OF FREE THYROXINE: CPT | Performed by: FAMILY MEDICINE

## 2024-03-07 PROCEDURE — 3079F DIAST BP 80-89 MM HG: CPT | Mod: CPTII,,, | Performed by: FAMILY MEDICINE

## 2024-03-07 PROCEDURE — 3072F LOW RISK FOR RETINOPATHY: CPT | Mod: CPTII,,, | Performed by: FAMILY MEDICINE

## 2024-03-07 PROCEDURE — 99214 OFFICE O/P EST MOD 30 MIN: CPT | Mod: 25,S$PBB,, | Performed by: FAMILY MEDICINE

## 2024-03-07 PROCEDURE — 36415 COLL VENOUS BLD VENIPUNCTURE: CPT | Mod: PO | Performed by: FAMILY MEDICINE

## 2024-03-07 PROCEDURE — 84153 ASSAY OF PSA TOTAL: CPT | Performed by: FAMILY MEDICINE

## 2024-03-07 PROCEDURE — 80061 LIPID PANEL: CPT | Performed by: FAMILY MEDICINE

## 2024-03-07 PROCEDURE — 99213 OFFICE O/P EST LOW 20 MIN: CPT | Mod: PBBFAC,PO,25 | Performed by: FAMILY MEDICINE

## 2024-03-07 PROCEDURE — 83036 HEMOGLOBIN GLYCOSYLATED A1C: CPT | Performed by: FAMILY MEDICINE

## 2024-03-07 PROCEDURE — 99999 PR PBB SHADOW E&M-EST. PATIENT-LVL III: CPT | Mod: PBBFAC,,, | Performed by: FAMILY MEDICINE

## 2024-03-07 PROCEDURE — 1159F MED LIST DOCD IN RCRD: CPT | Mod: CPTII,,, | Performed by: FAMILY MEDICINE

## 2024-03-07 PROCEDURE — 3008F BODY MASS INDEX DOCD: CPT | Mod: CPTII,,, | Performed by: FAMILY MEDICINE

## 2024-03-07 PROCEDURE — 80053 COMPREHEN METABOLIC PANEL: CPT | Performed by: FAMILY MEDICINE

## 2024-03-07 RX ORDER — METHYLPREDNISOLONE ACETATE 40 MG/ML
40 INJECTION, SUSPENSION INTRA-ARTICULAR; INTRALESIONAL; INTRAMUSCULAR; SOFT TISSUE
Status: COMPLETED | OUTPATIENT
Start: 2024-03-07 | End: 2024-03-07

## 2024-03-07 RX ADMIN — METHYLPREDNISOLONE ACETATE 40 MG: 40 INJECTION, SUSPENSION INTRALESIONAL; INTRAMUSCULAR; INTRASYNOVIAL; SOFT TISSUE at 11:03

## 2024-03-07 NOTE — PROGRESS NOTES
Chief Complaint:    Chief Complaint   Patient presents with    Shoulder Pain       History of Present Illness:  Patient with uncontrolled type 2 DM with hyperglycemia, HTN, circadian rhythm sleep disorder presents today for shoulder pain,    Right shoulder pain for 2 weeks with movement, no known injury    Diabetes is controlled    Hypertension stable    Also has hypothyroidism    Chronic insomnia on Lunesta    Circadian rhythm disorder stable on current meds      ROS:  Review of Systems   Constitutional:  Negative for appetite change, chills and fever.   HENT:  Negative for congestion, ear pain, postnasal drip, rhinorrhea, sinus pressure and sinus pain.    Eyes:  Negative for pain.   Respiratory:  Negative for cough, chest tightness and shortness of breath.    Cardiovascular:  Negative for chest pain and palpitations.   Gastrointestinal:  Negative for abdominal pain, blood in stool, constipation, diarrhea and nausea.   Genitourinary:  Negative for difficulty urinating, dysuria, flank pain and hematuria.   Musculoskeletal:  Negative for arthralgias and myalgias.        (+) R shoulder pain   Skin:  Negative for pallor and wound.   Neurological:  Negative for dizziness, tremors, speech difficulty, light-headedness and headaches.   Psychiatric/Behavioral:  Negative for behavioral problems, dysphoric mood and sleep disturbance. The patient is not nervous/anxious.    All other systems reviewed and are negative.      Past Medical History:   Diagnosis Date    Anxiety     Depression     Diabetes mellitus 06/2017    BS doesn't check 06/02/2023    Hypertension     Hypothyroid     Sleep disorder     Squamous cell carcinoma of skin 10/2019    right shoulder    Stroke        Social History:  Social History     Socioeconomic History    Marital status:    Tobacco Use    Smoking status: Former     Current packs/day: 1.00     Types: Vaping w/o nicotine, Cigarettes    Smokeless tobacco: Never    Tobacco comments:     vape     Substance and Sexual Activity    Alcohol use: No     Comment: couple times a year    Drug use: No    Sexual activity: Yes     Partners: Female     Birth control/protection: None       Family History:   family history includes Cancer in his father and mother; Cataracts in his father and mother; Diabetes in his maternal grandmother; Macular degeneration in his father.    Health Maintenance   Topic Date Due    Foot Exam  04/14/2023    Lipid Panel  07/14/2023    Hemoglobin A1c  11/28/2023    Eye Exam  06/02/2024    High Dose Statin  06/02/2024    TETANUS VACCINE  05/14/2025    Colorectal Cancer Screening  03/07/2029    Hepatitis C Screening  Completed    Shingles Vaccine  Completed       Physical Exam:    Vital Signs  Pulse: 74  SpO2: 99 %  BP: 126/82  BP Location: Left arm  Patient Position: Sitting  Pain Score:   3  Pain Loc: Shoulder  Height and Weight  Weight: 102 kg (224 lb 15.7 oz)]    Body mass index is 32.28 kg/m².    Physical Exam  Constitutional:       Appearance: Normal appearance.   HENT:      Head: Normocephalic and atraumatic.      Right Ear: Tympanic membrane normal.      Left Ear: Tympanic membrane normal.   Eyes:      Extraocular Movements: Extraocular movements intact.      Pupils: Pupils are equal, round, and reactive to light.   Cardiovascular:      Rate and Rhythm: Normal rate and regular rhythm.      Pulses: Normal pulses.      Heart sounds: Normal heart sounds. No murmur heard.     No gallop.   Pulmonary:      Effort: Pulmonary effort is normal. No respiratory distress.      Breath sounds: Normal breath sounds. No wheezing, rhonchi or rales.   Abdominal:      General: There is no distension.      Palpations: Abdomen is soft.      Tenderness: There is no abdominal tenderness.   Musculoskeletal:         General: No swelling, deformity or signs of injury.      Right shoulder: Tenderness present. Decreased range of motion.        Arms:       Cervical back: Normal range of motion.      Comments:  Subacromial tenderness, pain begins during abduction of right shoulder at about 90 degree david   Skin:     General: Skin is warm and dry.      Capillary Refill: Capillary refill takes less than 2 seconds.      Coloration: Skin is not jaundiced or pale.   Neurological:      General: No focal deficit present.      Mental Status: He is alert and oriented to person, place, and time.   Psychiatric:         Mood and Affect: Mood normal.         Behavior: Behavior normal.           Diabetes Management Status    Statin: Taking  ACE/ARB: Taking    Screening or Prevention Patient's value Goal Complete/Controlled?   HgA1C Testing and Control   Lab Results   Component Value Date    HGBA1C 5.8 (H) 07/14/2022      Annually/Less than 8% Yes   Lipid profile : 07/14/2022 Annually Yes   LDL control Lab Results   Component Value Date    LDLCALC 70.4 07/14/2022    Annually/Less than 100 mg/dl  Yes   Nephropathy screening Lab Results   Component Value Date    LABMICR 5.0 07/14/2022     Lab Results   Component Value Date    PROTEINUA NEG 10/24/2006    Annually No   Blood pressure BP Readings from Last 1 Encounters:   03/07/24 126/82    Less than 140/90 Yes   Dilated retinal exam : 06/02/2023 Annually No   Foot exam   : 04/14/2022 Annually Yes       Assessment:      ICD-10-CM ICD-9-CM   1. Rotator cuff tendinitis, right  M75.81 726.10   2. Prostate cancer screening encounter, options and risks discussed  Z12.5 V76.44   3. Uncontrolled type 2 diabetes mellitus with hyperglycemia  E11.65 250.02   4. Primary hypertension  I10 401.9   5. Insomnia, unspecified type  G47.00 780.52         Plan:    Administered steroid injection to Right subacromial space. Massage area and apply ice after injection. Avoid heavy lifting.     Offered steroid injection, risk of steroid injection discussed with the patient which include but not limited to,  1.  Infection  2.  Bleeding  3.  Tendon disruption  4.  Elevation of blood sugar  5.  Fat atrophy  6.   Worsening pain and other unforeseen complication.  Treatment alternatives were also discussed, patient likes to proceed with the steroid injection.  Depo-Medrol 40 mg and lidocaine 2% without epi Cc was used for injection, and the area was identified prepped and injection done sterile condition, patient tolerated procedure well.    Given handout of rotator cuff exercises.     Labs today. Check PSA.       Orders Placed This Encounter   Procedures    PSA, Screening       Current Outpatient Medications   Medication Sig Dispense Refill    amLODIPine (NORVASC) 5 MG tablet Take 1 tablet (5 mg total) by mouth once daily. 90 tablet 3    atorvastatin (LIPITOR) 20 MG tablet Take 1 tablet (20 mg total) by mouth once daily. 90 tablet 2    atorvastatin (LIPITOR) 20 MG tablet Take 1 tablet (20 mg total) by mouth once daily. 90 tablet 1    butalbital-acetaminophen-caffeine -40 mg (FIORICET, ESGIC) -40 mg per tablet Take 1 tablet by mouth every 4 (four) hours as needed for Headaches. 30 tablet 1    co-enzyme Q-10 30 mg capsule Take 30 mg by mouth 3 (three) times daily.      eszopiclone (LUNESTA) 2 MG Tab Take 1 tablet (2 mg total) by mouth every evening. 30 tablet 5    eszopiclone (LUNESTA) 2 MG Tab Take 1 tablet (2 mg total) by mouth every evening. 90 tablet 1    levothyroxine (SYNTHROID) 200 MCG tablet Take 1 tablet (200 mcg total) by mouth once daily. 90 tablet 3    lisinopriL-hydrochlorothiazide (PRINZIDE,ZESTORETIC) 20-12.5 mg per tablet Take 1 tablet by mouth once daily. 90 tablet 3    MELATONIN ORAL Take by mouth. Patient states that he takes 5 mg daily      modafiniL (PROVIGIL) 200 MG Tab Take 1 tablet (200 mg total) by mouth once daily. 90 tablet 1    paroxetine (PAXIL) 20 MG tablet Take 1 tablet (20 mg total) by mouth once daily. 90 tablet 3    propranoloL (INDERAL LA) 120 MG 24 hr capsule Take 1 capsule (120 mg total) by mouth once daily. 90 capsule 3    SITagliptan-metformin (JANUMET)  mg per tablet  Take 1 tablet by mouth 2 (two) times daily with meals. 180 tablet 3    sildenafil (VIAGRA) 50 MG tablet Take 1 tablet (50 mg total) by mouth daily as needed for Erectile Dysfunction. 30 tablet 11     Current Facility-Administered Medications   Medication Dose Route Frequency Provider Last Rate Last Admin    methylPREDNISolone acetate injection 40 mg  40 mg Intramuscular 1 time in Clinic/HOD Delores Lazar MD           There are no discontinued medications.      No follow-ups on file.      Delores Lazar MD    Scribe Attestation:   I, Gonzalo Enamorado, am scribing for, and in the presence of, Dr.Arif Lazar I performed the above scribed service and the documentation accurately describes the services I performed. I attest to the accuracy of the note.    I, Dr. Delores Lazar, reviewed documentation as scribed above. I performed the services described in this documentation.  I agree that the record reflects my personal performance and is accurate and complete. Delores Lazar MD.  03/07/2024

## 2024-03-15 ENCOUNTER — PATIENT MESSAGE (OUTPATIENT)
Dept: FAMILY MEDICINE | Facility: CLINIC | Age: 54
End: 2024-03-15
Payer: MEDICAID

## 2024-03-18 RX ORDER — TIRZEPATIDE 2.5 MG/.5ML
INJECTION, SOLUTION SUBCUTANEOUS
Qty: 4 PEN | Refills: 0 | Status: SHIPPED | OUTPATIENT
Start: 2024-03-18 | End: 2024-04-19 | Stop reason: SDUPTHER

## 2024-03-18 RX ORDER — TIRZEPATIDE 2.5 MG/.5ML
2.5 INJECTION, SOLUTION SUBCUTANEOUS
Qty: 4 PEN | Refills: 0 | Status: SHIPPED | OUTPATIENT
Start: 2024-03-18 | End: 2024-03-18

## 2024-03-18 NOTE — TELEPHONE ENCOUNTER
No care due was identified.  [facility] Embedded Care Due Messages. Reference number: [id number].   3/18/2024 11:23:30 AM CDT

## 2024-04-07 RX ORDER — PROPRANOLOL HYDROCHLORIDE 120 MG/1
120 CAPSULE, EXTENDED RELEASE ORAL
Qty: 90 CAPSULE | Refills: 3 | Status: SHIPPED | OUTPATIENT
Start: 2024-04-07

## 2024-04-07 NOTE — TELEPHONE ENCOUNTER
No care due was identified.  [facility] Embedded Care Due Messages. Reference number: [id number].   4/07/2024 6:33:59 AM CDT

## 2024-04-07 NOTE — TELEPHONE ENCOUNTER
Refill Decision Note   Wilman Halle  is requesting a refill authorization.  Brief Assessment and Rationale for Refill:  Approve     Medication Therapy Plan:        Comments:     Note composed:5:14 PM 04/07/2024

## 2024-04-10 ENCOUNTER — PATIENT MESSAGE (OUTPATIENT)
Dept: FAMILY MEDICINE | Facility: CLINIC | Age: 54
End: 2024-04-10
Payer: MEDICAID

## 2024-04-10 DIAGNOSIS — E11.65 UNCONTROLLED TYPE 2 DIABETES MELLITUS WITH HYPERGLYCEMIA: Primary | ICD-10-CM

## 2024-04-10 RX ORDER — INSULIN PUMP SYRINGE, 3 ML
EACH MISCELLANEOUS
Qty: 1 EACH | Refills: 0 | Status: SHIPPED | OUTPATIENT
Start: 2024-04-10 | End: 2025-04-10

## 2024-04-10 RX ORDER — LANCETS
EACH MISCELLANEOUS
Qty: 100 EACH | Refills: 3 | Status: SHIPPED | OUTPATIENT
Start: 2024-04-10

## 2024-04-10 NOTE — TELEPHONE ENCOUNTER
No care due was identified.  [facility] Embedded Care Due Messages. Reference number: [id number].   4/10/2024 11:59:32 AM CDT

## 2024-04-12 DIAGNOSIS — G47.00 INSOMNIA, UNSPECIFIED TYPE: ICD-10-CM

## 2024-04-12 RX ORDER — ESZOPICLONE 2 MG/1
2 TABLET, FILM COATED ORAL NIGHTLY
Qty: 90 TABLET | Refills: 0 | Status: SHIPPED | OUTPATIENT
Start: 2024-04-12

## 2024-04-12 NOTE — TELEPHONE ENCOUNTER
No care due was identified.  [facility] Embedded Care Due Messages. Reference number: [id number].   4/12/2024 10:46:51 AM CDT

## 2024-04-19 ENCOUNTER — PATIENT MESSAGE (OUTPATIENT)
Dept: FAMILY MEDICINE | Facility: CLINIC | Age: 54
End: 2024-04-19
Payer: MEDICAID

## 2024-04-19 RX ORDER — TIRZEPATIDE 2.5 MG/.5ML
INJECTION, SOLUTION SUBCUTANEOUS
Qty: 4 PEN | Refills: 0 | Status: SHIPPED | OUTPATIENT
Start: 2024-04-19 | End: 2024-05-23 | Stop reason: SDUPTHER

## 2024-04-19 NOTE — TELEPHONE ENCOUNTER
No care due was identified.  [facility] Embedded Care Due Messages. Reference number: [id number].   4/19/2024 8:09:38 AM CDT   Yes negative

## 2024-05-19 NOTE — TELEPHONE ENCOUNTER
No care due was identified.  [facility] Embedded Care Due Messages. Reference number: [id number].   5/19/2024 12:36:14 PM CDT

## 2024-05-20 RX ORDER — ATORVASTATIN CALCIUM 20 MG/1
20 TABLET, FILM COATED ORAL
Qty: 90 TABLET | Refills: 3 | Status: SHIPPED | OUTPATIENT
Start: 2024-05-20

## 2024-05-20 NOTE — TELEPHONE ENCOUNTER
Refill Decision Note   Wilman Neri  is requesting a refill authorization.  Brief Assessment and Rationale for Refill:  Approve     Medication Therapy Plan:         Alert overridden per protocol: Yes   Comments:     Note composed:7:23 AM 05/20/2024

## 2024-05-23 NOTE — TELEPHONE ENCOUNTER
No care due was identified.  [facility] Embedded Care Due Messages. Reference number: [id number].   5/23/2024 3:35:09 PM CDT

## 2024-05-23 NOTE — TELEPHONE ENCOUNTER
Refill Routing Note   Medication(s) are not appropriate for processing by [facility] for the following reason(s):        New or recently adjusted medication:     [facility] action(s):  Defer      Medication Therapy Plan:         Appointments  past 12m or future 3m with PCP    Date Provider   Last Visit   3/7/2024 Delores Lazar MD   Next Visit   Visit date not found Delores Lazar MD   ED visits in past 90 days: 0        Note composed:6:10 PM 05/23/2024

## 2024-05-24 RX ORDER — TIRZEPATIDE 2.5 MG/.5ML
2.5 INJECTION, SOLUTION SUBCUTANEOUS WEEKLY
Qty: 12 PEN | Refills: 1 | Status: SHIPPED | OUTPATIENT
Start: 2024-05-24

## 2024-06-04 RX ORDER — PAROXETINE HYDROCHLORIDE 20 MG/1
20 TABLET, FILM COATED ORAL
Qty: 90 TABLET | Refills: 3 | Status: SHIPPED | OUTPATIENT
Start: 2024-06-04

## 2024-06-04 NOTE — TELEPHONE ENCOUNTER
No care due was identified.  [facility] Embedded Care Due Messages. Reference number: [id number].   6/04/2024 5:32:30 AM CDT

## 2024-06-04 NOTE — TELEPHONE ENCOUNTER
Refill Decision Note   Wilman Halle  is requesting a refill authorization.  Brief Assessment and Rationale for Refill:  Approve     Medication Therapy Plan:         Comments:     Note composed:10:05 AM 06/04/2024

## 2024-06-20 ENCOUNTER — OFFICE VISIT (OUTPATIENT)
Dept: FAMILY MEDICINE | Facility: CLINIC | Age: 54
End: 2024-06-20
Payer: MEDICAID

## 2024-06-20 VITALS
HEART RATE: 91 BPM | OXYGEN SATURATION: 97 % | DIASTOLIC BLOOD PRESSURE: 80 MMHG | WEIGHT: 212.88 LBS | SYSTOLIC BLOOD PRESSURE: 112 MMHG | BODY MASS INDEX: 30.54 KG/M2

## 2024-06-20 DIAGNOSIS — G89.29 CHRONIC RIGHT SHOULDER PAIN: Primary | ICD-10-CM

## 2024-06-20 DIAGNOSIS — R25.1 TREMORS OF NERVOUS SYSTEM: ICD-10-CM

## 2024-06-20 DIAGNOSIS — M25.511 CHRONIC RIGHT SHOULDER PAIN: Primary | ICD-10-CM

## 2024-06-20 PROCEDURE — 3044F HG A1C LEVEL LT 7.0%: CPT | Mod: CPTII,,, | Performed by: FAMILY MEDICINE

## 2024-06-20 PROCEDURE — 4010F ACE/ARB THERAPY RXD/TAKEN: CPT | Mod: CPTII,,, | Performed by: FAMILY MEDICINE

## 2024-06-20 PROCEDURE — 3079F DIAST BP 80-89 MM HG: CPT | Mod: CPTII,,, | Performed by: FAMILY MEDICINE

## 2024-06-20 PROCEDURE — 1159F MED LIST DOCD IN RCRD: CPT | Mod: CPTII,,, | Performed by: FAMILY MEDICINE

## 2024-06-20 PROCEDURE — 3074F SYST BP LT 130 MM HG: CPT | Mod: CPTII,,, | Performed by: FAMILY MEDICINE

## 2024-06-20 PROCEDURE — 99999 PR PBB SHADOW E&M-EST. PATIENT-LVL IV: CPT | Mod: PBBFAC,,, | Performed by: FAMILY MEDICINE

## 2024-06-20 PROCEDURE — 99214 OFFICE O/P EST MOD 30 MIN: CPT | Mod: PBBFAC,PO | Performed by: FAMILY MEDICINE

## 2024-06-20 PROCEDURE — 3008F BODY MASS INDEX DOCD: CPT | Mod: CPTII,,, | Performed by: FAMILY MEDICINE

## 2024-06-20 PROCEDURE — 99214 OFFICE O/P EST MOD 30 MIN: CPT | Mod: S$PBB,,, | Performed by: FAMILY MEDICINE

## 2024-06-20 PROCEDURE — 3072F LOW RISK FOR RETINOPATHY: CPT | Mod: CPTII,,, | Performed by: FAMILY MEDICINE

## 2024-06-20 RX ORDER — GABAPENTIN 100 MG/1
100 CAPSULE ORAL 3 TIMES DAILY
Qty: 90 CAPSULE | Refills: 11 | Status: SHIPPED | OUTPATIENT
Start: 2024-06-20 | End: 2025-06-20

## 2024-06-20 NOTE — PROGRESS NOTES
Chief Complaint:    Chief Complaint   Patient presents with    Shoulder Pain       History of Present Illness:  Patient with uncontrolled type 2 DM with hyperglycemia, HTN, circadian rhythm sleep disorder presents today for shoulder pain,    History of Present Illness    Patient presents with right shoulder pain and worsening tremors. Reports worsening right shoulder pain since receiving a steroid injection in March which provided about 2 weeks of relief. Pain is localized to the posterior shoulder. He is unable to push himself up off the floor, has difficulty brushing his hair, and can no longer sleep on that arm. Denies radiating pain, numbness, or weakness in the arm or hand. Reports worsening tremors, particularly in the left hand when trying to perform tasks like soldering wires and typing. Tremors are worse with intentional movements and less bothersome at rest. Currently taking propranolol but feels he needs an additional medication to take occasionally when tremors significantly interfere with activities. Reports 12 pound weight loss facilitated by a prescribed injectable medication which has reduced hunger. States blood sugar have been within normal range. Denies experiencing increased hunger or eating excessive portions.           ROS:  Review of Systems   Constitutional:  Negative for appetite change, chills and fever.   HENT:  Negative for congestion, ear pain, postnasal drip, rhinorrhea, sinus pressure and sinus pain.    Eyes:  Negative for pain.   Respiratory:  Negative for cough, chest tightness and shortness of breath.    Cardiovascular:  Negative for chest pain and palpitations.   Gastrointestinal:  Negative for abdominal pain, blood in stool, constipation, diarrhea and nausea.   Genitourinary:  Negative for difficulty urinating, dysuria, flank pain and hematuria.   Musculoskeletal:  Negative for arthralgias and myalgias.        (+) R shoulder pain   Skin:  Negative for pallor and wound.    Neurological:  Negative for dizziness, tremors, speech difficulty, light-headedness and headaches.   Psychiatric/Behavioral:  Negative for behavioral problems, dysphoric mood and sleep disturbance. The patient is not nervous/anxious.    All other systems reviewed and are negative.      Past Medical History:   Diagnosis Date    Anxiety     Depression     Diabetes mellitus 06/2017    BS doesn't check 06/02/2023    Hypertension     Hypothyroid     Sleep disorder     Squamous cell carcinoma of skin 10/2019    right shoulder    Stroke        Social History:  Social History     Socioeconomic History    Marital status:    Tobacco Use    Smoking status: Former     Current packs/day: 1.00     Types: Vaping w/o nicotine, Cigarettes    Smokeless tobacco: Never    Tobacco comments:     vape    Substance and Sexual Activity    Alcohol use: No     Comment: couple times a year    Drug use: No    Sexual activity: Yes     Partners: Female     Birth control/protection: None       Family History:   family history includes Cancer in his father and mother; Cataracts in his father and mother; Diabetes in his maternal grandmother; Macular degeneration in his father.    Health Maintenance   Topic Date Due    Foot Exam  04/14/2023    Eye Exam  06/02/2024    Hemoglobin A1c  09/07/2024    Lipid Panel  03/07/2025    TETANUS VACCINE  05/14/2025    High Dose Statin  06/20/2025    Colorectal Cancer Screening  03/07/2029    Hepatitis C Screening  Completed    Shingles Vaccine  Completed       Physical Exam:    Vital Signs  Pulse: 91  SpO2: 97 %  BP: 112/80  BP Location: Left arm  Patient Position: Sitting  Pain Score: 10-Worst pain ever  Height and Weight  Weight: 96.6 kg (212 lb 13.7 oz)]    Body mass index is 30.54 kg/m².    Physical Exam  Constitutional:       Appearance: Normal appearance.   HENT:      Head: Normocephalic and atraumatic.      Right Ear: Tympanic membrane normal.      Left Ear: Tympanic membrane normal.   Eyes:       Extraocular Movements: Extraocular movements intact.      Pupils: Pupils are equal, round, and reactive to light.   Cardiovascular:      Rate and Rhythm: Normal rate and regular rhythm.      Pulses: Normal pulses.      Heart sounds: Normal heart sounds. No murmur heard.     No gallop.   Pulmonary:      Effort: Pulmonary effort is normal. No respiratory distress.      Breath sounds: Normal breath sounds. No wheezing, rhonchi or rales.   Abdominal:      General: There is no distension.      Palpations: Abdomen is soft.      Tenderness: There is no abdominal tenderness.   Musculoskeletal:         General: No swelling, deformity or signs of injury.      Right shoulder: Tenderness present. Decreased range of motion.        Arms:       Cervical back: Normal range of motion.      Comments: Subacromial tenderness, pain begins during abduction of right shoulder at about 90 degree david   Skin:     General: Skin is warm and dry.      Capillary Refill: Capillary refill takes less than 2 seconds.      Coloration: Skin is not jaundiced or pale.   Neurological:      General: No focal deficit present.      Mental Status: He is alert and oriented to person, place, and time.   Psychiatric:         Mood and Affect: Mood normal.         Behavior: Behavior normal.           Diabetes Management Status    Statin: Taking  ACE/ARB: Taking    Screening or Prevention Patient's value Goal Complete/Controlled?   HgA1C Testing and Control   Lab Results   Component Value Date    HGBA1C 6.4 (H) 03/07/2024    HGBA1C 6.4 (H) 03/07/2024      Annually/Less than 8% Yes   Lipid profile : 03/07/2024 Annually Yes   LDL control Lab Results   Component Value Date    LDLCALC 95.8 03/07/2024    Annually/Less than 100 mg/dl  Yes   Nephropathy screening Lab Results   Component Value Date    LABMICR 5.0 07/14/2022     Lab Results   Component Value Date    PROTEINUA NEG 10/24/2006    Annually No   Blood pressure BP Readings from Last 1 Encounters:   06/20/24  112/80    Less than 140/90 Yes   Dilated retinal exam : 06/02/2023 Annually No   Foot exam   : 04/14/2022 Annually Yes       Assessment:      ICD-10-CM ICD-9-CM   1. Chronic right shoulder pain  M25.511 719.41    G89.29 338.29   2. Tremors of nervous system  R25.1 781.0         Plan:    Assessment & Plan    ROTATOR CUFF PATHOLOGY:  1. Suspect patient's persistent right shoulder pain and functional limitations may be due to rotator cuff pathology such as partial or complete muscle tears, given lack of sustained improvement with conservative measures.  2. Will obtain MRI of right shoulder to evaluate for rotator cuff tears and guide further management, as surgical intervention may be indicated if a major tear is present.  3. MRI right shoulder ordered to evaluate for rotator cuff pathology.  4. Follow up after completing MRI of right shoulder to review results and discuss further management.  ESSENTIAL TREMOR:  1. Believe patient's bilateral hand tremors are likely essential tremor, as they occur with intentional movements and are less prominent at rest.  2. Started Neurontin for essential tremor and symptomatic management.  3. Continued propranolol and Adderall.           Orders Placed This Encounter   Procedures    MRI Shoulder Without Contrast Right       Current Outpatient Medications   Medication Sig Dispense Refill    amLODIPine (NORVASC) 5 MG tablet Take 1 tablet (5 mg total) by mouth once daily. 90 tablet 3    atorvastatin (LIPITOR) 20 MG tablet Take 1 tablet (20 mg total) by mouth once daily. 90 tablet 2    atorvastatin (LIPITOR) 20 MG tablet Take 1 tablet by mouth once daily 90 tablet 3    blood sugar diagnostic Strp To check BG 2 times daily, to use with insurance preferred meter 100 each 3    blood-glucose meter kit To check BG 2 times daily, to use with insurance preferred meter 1 each 0    butalbital-acetaminophen-caffeine -40 mg (FIORICET, ESGIC) -40 mg per tablet Take 1 tablet by mouth  every 4 (four) hours as needed for Headaches. 30 tablet 1    co-enzyme Q-10 30 mg capsule Take 30 mg by mouth 3 (three) times daily.      eszopiclone (LUNESTA) 2 MG Tab Take 1 tablet (2 mg total) by mouth every evening. 30 tablet 5    eszopiclone (LUNESTA) 2 MG Tab Take 1 tablet by mouth in the evening 90 tablet 0    lancets Misc To check BG 2 times daily, to use with insurance preferred meter 100 each 3    levothyroxine (SYNTHROID) 200 MCG tablet Take 1 tablet (200 mcg total) by mouth once daily. 90 tablet 3    lisinopriL-hydrochlorothiazide (PRINZIDE,ZESTORETIC) 20-12.5 mg per tablet Take 1 tablet by mouth once daily. 90 tablet 3    MELATONIN ORAL Take by mouth. Patient states that he takes 5 mg daily      modafiniL (PROVIGIL) 200 MG Tab Take 1 tablet (200 mg total) by mouth once daily. 90 tablet 1    paroxetine (PAXIL) 20 MG tablet Take 1 tablet by mouth once daily 90 tablet 3    propranoloL (INDERAL LA) 120 MG 24 hr capsule Take 1 capsule by mouth once daily 90 capsule 3    tirzepatide (MOUNJARO) 2.5 mg/0.5 mL PnIj Inject 2.5 mg into the skin once a week. 12 Pen 1    gabapentin (NEURONTIN) 100 MG capsule Take 1 capsule (100 mg total) by mouth 3 (three) times daily. 90 capsule 11    sildenafil (VIAGRA) 50 MG tablet Take 1 tablet (50 mg total) by mouth daily as needed for Erectile Dysfunction. 30 tablet 11     No current facility-administered medications for this visit.       There are no discontinued medications.      No follow-ups on file.      Delores Lazar MD    Scribe Attestation:   I, Gonzalo Enamorado, am scribing for, and in the presence of, Dr.Arif Lazar I performed the above scribed service and the documentation accurately describes the services I performed. I attest to the accuracy of the note.    I, Dr. Delores Lazar, reviewed documentation as scribed above. I performed the services described in this documentation.  I agree that the record reflects my personal performance and is accurate and complete. Delores  MD Cayd.  06/20/2024

## 2024-06-27 ENCOUNTER — TELEPHONE (OUTPATIENT)
Dept: FAMILY MEDICINE | Facility: CLINIC | Age: 54
End: 2024-06-27
Payer: COMMERCIAL

## 2024-06-27 NOTE — TELEPHONE ENCOUNTER
----- Message from Sophia Henson sent at 6/27/2024  8:25 AM CDT -----  Regarding: MRI appointment.  Good morning,     Patient is scheduled for MRI appointment and referral is not approved at this time.  Can you please let me know if this is medically urgent or can it be moved to a later date?    Thanks  Sophia   Radiology Scheduler  [phone]

## 2024-07-02 ENCOUNTER — PATIENT MESSAGE (OUTPATIENT)
Dept: FAMILY MEDICINE | Facility: CLINIC | Age: 54
End: 2024-07-02
Payer: COMMERCIAL

## 2024-07-04 DIAGNOSIS — G47.00 INSOMNIA, UNSPECIFIED TYPE: ICD-10-CM

## 2024-07-05 RX ORDER — MODAFINIL 200 MG/1
200 TABLET ORAL
Qty: 90 TABLET | Refills: 0 | Status: SHIPPED | OUTPATIENT
Start: 2024-07-05

## 2024-07-05 RX ORDER — AMLODIPINE BESYLATE 5 MG/1
5 TABLET ORAL
Qty: 90 TABLET | Refills: 3 | Status: SHIPPED | OUTPATIENT
Start: 2024-07-05

## 2024-07-05 RX ORDER — ESZOPICLONE 2 MG/1
2 TABLET, FILM COATED ORAL NIGHTLY
Qty: 90 TABLET | Refills: 0 | Status: SHIPPED | OUTPATIENT
Start: 2024-07-05

## 2024-07-05 NOTE — TELEPHONE ENCOUNTER
Care Due:                  Date            Visit Type   Department     Provider  --------------------------------------------------------------------------------                                EP -                              PR[place]      [facility] [place]  Last Visit: 06-      CARE ([facility])   MEDICINE       Delores Lazar  Next Visit: None Scheduled  None         None Found                                                            Last  Test          Frequency    Reason                     Performed    Due Date  --------------------------------------------------------------------------------    HBA1C.......  6 months...  tirzepatide..............  03- 09-    [facility] Embedded Care Due Messages. Reference number: [id number].   7/04/2024 7:45:15 PM CDT

## 2024-07-05 NOTE — TELEPHONE ENCOUNTER
Refill Routing Note   Medication(s) are not appropriate for processing by [facility] for the following reason(s):        Outside of protocol    [facility] action(s):  Approve  Route     Requires labs : Yes             Appointments  past 12m or future 3m with PCP    Date Provider   Last Visit   6/20/2024 Delores Lazar MD   Next Visit   Visit date not found Delores Lazar MD   ED visits in past 90 days: 0        Note composed:12:32 PM 07/05/2024

## 2024-07-17 ENCOUNTER — TELEPHONE (OUTPATIENT)
Dept: FAMILY MEDICINE | Facility: CLINIC | Age: 54
End: 2024-07-17
Payer: COMMERCIAL

## 2024-07-17 NOTE — TELEPHONE ENCOUNTER
Pt has never had xray or done PT in the last 6 months.  Until these things are done insurance will not cover MRI

## 2024-08-15 ENCOUNTER — HOSPITAL ENCOUNTER (INPATIENT)
Facility: HOSPITAL | Age: 54
LOS: 1 days | Discharge: HOME | DRG: 854 | End: 2024-08-17
Attending: STUDENT IN AN ORGANIZED HEALTH CARE EDUCATION/TRAINING PROGRAM | Admitting: INTERNAL MEDICINE

## 2024-08-15 DIAGNOSIS — K81.0 ACUTE CHOLECYSTITIS: Primary | ICD-10-CM

## 2024-08-15 DIAGNOSIS — R10.9 ABDOMINAL PAIN: ICD-10-CM

## 2024-08-15 DIAGNOSIS — R07.9 CHEST PAIN: ICD-10-CM

## 2024-08-15 DIAGNOSIS — I10 PRIMARY HYPERTENSION: ICD-10-CM

## 2024-08-15 DIAGNOSIS — A41.9 SEPSIS: ICD-10-CM

## 2024-08-15 LAB
ALBUMIN SERPL BCP-MCNC: 3.6 G/DL (ref 3.5–5.2)
ALP SERPL-CCNC: 54 U/L (ref 55–135)
ALT SERPL W/O P-5'-P-CCNC: 24 U/L (ref 10–44)
ANION GAP SERPL CALC-SCNC: 14 MMOL/L (ref 8–16)
AST SERPL-CCNC: 12 U/L (ref 10–40)
BASOPHILS # BLD AUTO: 0.07 K/UL (ref 0–0.2)
BASOPHILS NFR BLD: 0.4 % (ref 0–1.9)
BILIRUB SERPL-MCNC: 0.8 MG/DL (ref 0.1–1)
BILIRUB UR QL STRIP: NEGATIVE
BUN SERPL-MCNC: 10 MG/DL (ref 6–20)
CALCIUM SERPL-MCNC: 9.5 MG/DL (ref 8.7–10.5)
CHLORIDE SERPL-SCNC: 103 MMOL/L (ref 95–110)
CLARITY UR: CLEAR
CO2 SERPL-SCNC: 18 MMOL/L (ref 23–29)
COLOR UR: COLORLESS
CREAT SERPL-MCNC: 1 MG/DL (ref 0.5–1.4)
DIFFERENTIAL METHOD BLD: ABNORMAL
EOSINOPHIL # BLD AUTO: 0.2 K/UL (ref 0–0.5)
EOSINOPHIL NFR BLD: 1.1 % (ref 0–8)
ERYTHROCYTE [DISTWIDTH] IN BLOOD BY AUTOMATED COUNT: 13.1 % (ref 11.5–14.5)
EST. GFR  (NO RACE VARIABLE): >60 ML/MIN/1.73 M^2
GLUCOSE SERPL-MCNC: 120 MG/DL (ref 70–110)
GLUCOSE UR QL STRIP: NEGATIVE
HCT VFR BLD AUTO: 44.1 % (ref 40–54)
HCV AB SERPL QL IA: NEGATIVE
HEP C VIRUS HOLD SPECIMEN: NORMAL
HGB BLD-MCNC: 14.9 G/DL (ref 14–18)
HGB UR QL STRIP: NEGATIVE
HIV 1+2 AB+HIV1 P24 AG SERPL QL IA: NEGATIVE
IMM GRANULOCYTES # BLD AUTO: 0.08 K/UL (ref 0–0.04)
IMM GRANULOCYTES NFR BLD AUTO: 0.5 % (ref 0–0.5)
KETONES UR QL STRIP: NEGATIVE
LACTATE SERPL-SCNC: 1.1 MMOL/L (ref 0.5–2.2)
LEUKOCYTE ESTERASE UR QL STRIP: NEGATIVE
LIPASE SERPL-CCNC: 33 U/L (ref 4–60)
LYMPHOCYTES # BLD AUTO: 2.4 K/UL (ref 1–4.8)
LYMPHOCYTES NFR BLD: 15.2 % (ref 18–48)
MCH RBC QN AUTO: 28.4 PG (ref 27–31)
MCHC RBC AUTO-ENTMCNC: 33.8 G/DL (ref 32–36)
MCV RBC AUTO: 84 FL (ref 82–98)
MONOCYTES # BLD AUTO: 1.1 K/UL (ref 0.3–1)
MONOCYTES NFR BLD: 7.1 % (ref 4–15)
NEUTROPHILS # BLD AUTO: 12.1 K/UL (ref 1.8–7.7)
NEUTROPHILS NFR BLD: 75.7 % (ref 38–73)
NITRITE UR QL STRIP: NEGATIVE
NRBC BLD-RTO: 0 /100 WBC
PH UR STRIP: 7 [PH] (ref 5–8)
PLATELET # BLD AUTO: 215 K/UL (ref 150–450)
PMV BLD AUTO: 9 FL (ref 9.2–12.9)
POTASSIUM SERPL-SCNC: 4 MMOL/L (ref 3.5–5.1)
PROCALCITONIN SERPL IA-MCNC: 0.33 NG/ML
PROT SERPL-MCNC: 7.5 G/DL (ref 6–8.4)
PROT UR QL STRIP: ABNORMAL
RBC # BLD AUTO: 5.24 M/UL (ref 4.6–6.2)
SODIUM SERPL-SCNC: 135 MMOL/L (ref 136–145)
SP GR UR STRIP: >1.03 (ref 1–1.03)
URN SPEC COLLECT METH UR: ABNORMAL
UROBILINOGEN UR STRIP-ACNC: NEGATIVE EU/DL
WBC # BLD AUTO: 16.01 K/UL (ref 3.9–12.7)

## 2024-08-15 PROCEDURE — 84145 PROCALCITONIN (PCT): CPT

## 2024-08-15 PROCEDURE — 83605 ASSAY OF LACTIC ACID: CPT

## 2024-08-15 PROCEDURE — 63600175 PHARM REV CODE 636 W HCPCS

## 2024-08-15 PROCEDURE — 80053 COMPREHEN METABOLIC PANEL: CPT

## 2024-08-15 PROCEDURE — 81003 URINALYSIS AUTO W/O SCOPE: CPT

## 2024-08-15 PROCEDURE — 99285 EMERGENCY DEPT VISIT HI MDM: CPT | Mod: 25

## 2024-08-15 PROCEDURE — 87389 HIV-1 AG W/HIV-1&-2 AB AG IA: CPT | Performed by: EMERGENCY MEDICINE

## 2024-08-15 PROCEDURE — 96375 TX/PRO/DX INJ NEW DRUG ADDON: CPT

## 2024-08-15 PROCEDURE — 83690 ASSAY OF LIPASE: CPT

## 2024-08-15 PROCEDURE — 85025 COMPLETE CBC W/AUTO DIFF WBC: CPT

## 2024-08-15 PROCEDURE — 25500020 PHARM REV CODE 255: Performed by: NURSE PRACTITIONER

## 2024-08-15 PROCEDURE — 86803 HEPATITIS C AB TEST: CPT | Performed by: EMERGENCY MEDICINE

## 2024-08-15 PROCEDURE — 87040 BLOOD CULTURE FOR BACTERIA: CPT

## 2024-08-15 RX ORDER — MORPHINE SULFATE 4 MG/ML
4 INJECTION, SOLUTION INTRAMUSCULAR; INTRAVENOUS
Status: COMPLETED | OUTPATIENT
Start: 2024-08-15 | End: 2024-08-15

## 2024-08-15 RX ORDER — ONDANSETRON HYDROCHLORIDE 2 MG/ML
4 INJECTION, SOLUTION INTRAVENOUS
Status: COMPLETED | OUTPATIENT
Start: 2024-08-15 | End: 2024-08-15

## 2024-08-15 RX ADMIN — MORPHINE SULFATE 4 MG: 4 INJECTION INTRAVENOUS at 06:08

## 2024-08-15 RX ADMIN — IOHEXOL 100 ML: 350 INJECTION, SOLUTION INTRAVENOUS at 07:08

## 2024-08-15 RX ADMIN — ONDANSETRON 4 MG: 2 INJECTION INTRAMUSCULAR; INTRAVENOUS at 06:08

## 2024-08-15 NOTE — FIRST PROVIDER EVALUATION
Medical screening examination initiated.  I have conducted a focused provider triage encounter, findings are as follows:    Brief history of present illness:  Left lower quadrant abdominal pain since Tuesday.  Reports fever and chills at home.     Vitals:    08/15/24 1625   BP: 121/65   Pulse: (!) 114   Resp: 20   Temp: 100 °F (37.8 °C)   TempSrc: Oral   SpO2: 97%   Weight: 98 kg (216 lb)       Pertinent physical exam:  Left lower quadrant abdominal pain, tachycardia, fever    Brief workup plan:  Workup CT scan    Preliminary workup initiated; this workup will be continued and followed by the physician or advanced practice provider that is assigned to the patient when roomed.

## 2024-08-16 ENCOUNTER — ANESTHESIA (OUTPATIENT)
Dept: SURGERY | Facility: HOSPITAL | Age: 54
DRG: 854 | End: 2024-08-16

## 2024-08-16 ENCOUNTER — ANESTHESIA EVENT (OUTPATIENT)
Dept: SURGERY | Facility: HOSPITAL | Age: 54
DRG: 854 | End: 2024-08-16

## 2024-08-16 PROBLEM — N20.0 RIGHT NEPHROLITHIASIS: Status: ACTIVE | Noted: 2024-08-16

## 2024-08-16 PROBLEM — A41.9 SEPSIS: Status: ACTIVE | Noted: 2024-08-16

## 2024-08-16 PROBLEM — E78.5 HYPERLIPIDEMIA: Status: ACTIVE | Noted: 2024-08-16

## 2024-08-16 PROBLEM — K81.0 ACUTE CHOLECYSTITIS: Status: ACTIVE | Noted: 2024-08-16

## 2024-08-16 PROBLEM — K76.0 FATTY LIVER: Status: ACTIVE | Noted: 2024-08-16

## 2024-08-16 PROBLEM — E11.9 DIABETES MELLITUS TYPE 2, NONINSULIN DEPENDENT: Status: ACTIVE | Noted: 2024-08-16

## 2024-08-16 PROBLEM — E87.1 HYPONATREMIA: Status: ACTIVE | Noted: 2024-08-16

## 2024-08-16 LAB
ALBUMIN SERPL BCP-MCNC: 3 G/DL (ref 3.5–5.2)
ALP SERPL-CCNC: 51 U/L (ref 55–135)
ALT SERPL W/O P-5'-P-CCNC: 21 U/L (ref 10–44)
ANION GAP SERPL CALC-SCNC: 11 MMOL/L (ref 8–16)
APTT PPP: 34 SEC (ref 21–32)
AST SERPL-CCNC: 12 U/L (ref 10–40)
BASOPHILS # BLD AUTO: 0.08 K/UL (ref 0–0.2)
BASOPHILS NFR BLD: 0.7 % (ref 0–1.9)
BILIRUB SERPL-MCNC: 1 MG/DL (ref 0.1–1)
BUN SERPL-MCNC: 9 MG/DL (ref 6–20)
CALCIUM SERPL-MCNC: 9 MG/DL (ref 8.7–10.5)
CHLORIDE SERPL-SCNC: 101 MMOL/L (ref 95–110)
CO2 SERPL-SCNC: 24 MMOL/L (ref 23–29)
CREAT SERPL-MCNC: 0.9 MG/DL (ref 0.5–1.4)
DIFFERENTIAL METHOD BLD: ABNORMAL
EOSINOPHIL # BLD AUTO: 0.2 K/UL (ref 0–0.5)
EOSINOPHIL NFR BLD: 2 % (ref 0–8)
ERYTHROCYTE [DISTWIDTH] IN BLOOD BY AUTOMATED COUNT: 12.7 % (ref 11.5–14.5)
EST. GFR  (NO RACE VARIABLE): >60 ML/MIN/1.73 M^2
ESTIMATED AVG GLUCOSE: 117 MG/DL (ref 68–131)
GLUCOSE SERPL-MCNC: 101 MG/DL (ref 70–110)
HBA1C MFR BLD: 5.7 % (ref 4–5.6)
HCT VFR BLD AUTO: 37.6 % (ref 40–54)
HGB BLD-MCNC: 12.8 G/DL (ref 14–18)
HYPOCHROMIA BLD QL SMEAR: ABNORMAL
IMM GRANULOCYTES # BLD AUTO: 0.06 K/UL (ref 0–0.04)
IMM GRANULOCYTES NFR BLD AUTO: 0.5 % (ref 0–0.5)
INR PPP: 1 (ref 0.8–1.2)
LACTATE SERPL-SCNC: 1 MMOL/L (ref 0.5–2.2)
LYMPHOCYTES # BLD AUTO: 2.4 K/UL (ref 1–4.8)
LYMPHOCYTES NFR BLD: 21.2 % (ref 18–48)
MCH RBC QN AUTO: 28.4 PG (ref 27–31)
MCHC RBC AUTO-ENTMCNC: 34 G/DL (ref 32–36)
MCV RBC AUTO: 83 FL (ref 82–98)
MONOCYTES # BLD AUTO: 1 K/UL (ref 0.3–1)
MONOCYTES NFR BLD: 8.5 % (ref 4–15)
NEUTROPHILS # BLD AUTO: 7.7 K/UL (ref 1.8–7.7)
NEUTROPHILS NFR BLD: 67.1 % (ref 38–73)
NRBC BLD-RTO: 0 /100 WBC
PLATELET # BLD AUTO: 168 K/UL (ref 150–450)
PLATELET BLD QL SMEAR: ABNORMAL
PMV BLD AUTO: 9 FL (ref 9.2–12.9)
POCT GLUCOSE: 150 MG/DL (ref 70–110)
POCT GLUCOSE: 155 MG/DL (ref 70–110)
POCT GLUCOSE: 99 MG/DL (ref 70–110)
POTASSIUM SERPL-SCNC: 3.4 MMOL/L (ref 3.5–5.1)
PROT SERPL-MCNC: 6.1 G/DL (ref 6–8.4)
PROTHROMBIN TIME: 11.8 SEC (ref 9–12.5)
RBC # BLD AUTO: 4.51 M/UL (ref 4.6–6.2)
SODIUM SERPL-SCNC: 136 MMOL/L (ref 136–145)
T4 FREE SERPL-MCNC: 1.31 NG/DL (ref 0.71–1.51)
TSH SERPL DL<=0.005 MIU/L-ACNC: 0.06 UIU/ML (ref 0.4–4)
WBC # BLD AUTO: 11.48 K/UL (ref 3.9–12.7)

## 2024-08-16 PROCEDURE — 88304 TISSUE EXAM BY PATHOLOGIST: CPT | Mod: 26,,, | Performed by: PATHOLOGY

## 2024-08-16 PROCEDURE — 25000003 PHARM REV CODE 250: Performed by: SURGERY

## 2024-08-16 PROCEDURE — C1729 CATH, DRAINAGE: HCPCS | Performed by: SURGERY

## 2024-08-16 PROCEDURE — 36000710: Performed by: SURGERY

## 2024-08-16 PROCEDURE — 25000003 PHARM REV CODE 250: Performed by: INTERNAL MEDICINE

## 2024-08-16 PROCEDURE — 63600175 PHARM REV CODE 636 W HCPCS: Mod: JZ,JG | Performed by: SURGERY

## 2024-08-16 PROCEDURE — 88304 TISSUE EXAM BY PATHOLOGIST: CPT | Performed by: PATHOLOGY

## 2024-08-16 PROCEDURE — 85730 THROMBOPLASTIN TIME PARTIAL: CPT | Performed by: STUDENT IN AN ORGANIZED HEALTH CARE EDUCATION/TRAINING PROGRAM

## 2024-08-16 PROCEDURE — 8E0W4CZ ROBOTIC ASSISTED PROCEDURE OF TRUNK REGION, PERCUTANEOUS ENDOSCOPIC APPROACH: ICD-10-PCS | Performed by: SURGERY

## 2024-08-16 PROCEDURE — 37000008 HC ANESTHESIA 1ST 15 MINUTES: Performed by: SURGERY

## 2024-08-16 PROCEDURE — 25000003 PHARM REV CODE 250: Performed by: HOSPITALIST

## 2024-08-16 PROCEDURE — 96365 THER/PROPH/DIAG IV INF INIT: CPT

## 2024-08-16 PROCEDURE — 25000003 PHARM REV CODE 250: Performed by: STUDENT IN AN ORGANIZED HEALTH CARE EDUCATION/TRAINING PROGRAM

## 2024-08-16 PROCEDURE — 63600175 PHARM REV CODE 636 W HCPCS: Performed by: SURGERY

## 2024-08-16 PROCEDURE — 36000711: Performed by: SURGERY

## 2024-08-16 PROCEDURE — 80053 COMPREHEN METABOLIC PANEL: CPT | Performed by: INTERNAL MEDICINE

## 2024-08-16 PROCEDURE — 84439 ASSAY OF FREE THYROXINE: CPT | Performed by: INTERNAL MEDICINE

## 2024-08-16 PROCEDURE — 71000033 HC RECOVERY, INTIAL HOUR: Performed by: SURGERY

## 2024-08-16 PROCEDURE — 63600175 PHARM REV CODE 636 W HCPCS: Performed by: STUDENT IN AN ORGANIZED HEALTH CARE EDUCATION/TRAINING PROGRAM

## 2024-08-16 PROCEDURE — 25000003 PHARM REV CODE 250: Performed by: NURSE ANESTHETIST, CERTIFIED REGISTERED

## 2024-08-16 PROCEDURE — 99222 1ST HOSP IP/OBS MODERATE 55: CPT | Mod: ,,, | Performed by: SURGERY

## 2024-08-16 PROCEDURE — 63600175 PHARM REV CODE 636 W HCPCS: Performed by: NURSE ANESTHETIST, CERTIFIED REGISTERED

## 2024-08-16 PROCEDURE — 37000009 HC ANESTHESIA EA ADD 15 MINS: Performed by: SURGERY

## 2024-08-16 PROCEDURE — 85025 COMPLETE CBC W/AUTO DIFF WBC: CPT | Performed by: INTERNAL MEDICINE

## 2024-08-16 PROCEDURE — 83036 HEMOGLOBIN GLYCOSYLATED A1C: CPT | Performed by: INTERNAL MEDICINE

## 2024-08-16 PROCEDURE — 0FT44ZZ RESECTION OF GALLBLADDER, PERCUTANEOUS ENDOSCOPIC APPROACH: ICD-10-PCS | Performed by: SURGERY

## 2024-08-16 PROCEDURE — 63600175 PHARM REV CODE 636 W HCPCS: Performed by: INTERNAL MEDICINE

## 2024-08-16 PROCEDURE — 27201423 OPTIME MED/SURG SUP & DEVICES STERILE SUPPLY: Performed by: SURGERY

## 2024-08-16 PROCEDURE — 85610 PROTHROMBIN TIME: CPT | Performed by: STUDENT IN AN ORGANIZED HEALTH CARE EDUCATION/TRAINING PROGRAM

## 2024-08-16 PROCEDURE — 84443 ASSAY THYROID STIM HORMONE: CPT | Performed by: INTERNAL MEDICINE

## 2024-08-16 PROCEDURE — 11000001 HC ACUTE MED/SURG PRIVATE ROOM

## 2024-08-16 PROCEDURE — 83605 ASSAY OF LACTIC ACID: CPT

## 2024-08-16 RX ORDER — ATORVASTATIN CALCIUM 10 MG/1
20 TABLET, FILM COATED ORAL NIGHTLY
Status: DISCONTINUED | OUTPATIENT
Start: 2024-08-16 | End: 2024-08-17 | Stop reason: HOSPADM

## 2024-08-16 RX ORDER — ACETAMINOPHEN 650 MG/1
650 SUPPOSITORY RECTAL EVERY 6 HOURS PRN
Status: DISCONTINUED | OUTPATIENT
Start: 2024-08-16 | End: 2024-08-17 | Stop reason: HOSPADM

## 2024-08-16 RX ORDER — HYDROCODONE BITARTRATE AND ACETAMINOPHEN 10; 325 MG/1; MG/1
1 TABLET ORAL EVERY 4 HOURS PRN
Status: DISCONTINUED | OUTPATIENT
Start: 2024-08-16 | End: 2024-08-17 | Stop reason: HOSPADM

## 2024-08-16 RX ORDER — INSULIN ASPART 100 [IU]/ML
0-5 INJECTION, SOLUTION INTRAVENOUS; SUBCUTANEOUS EVERY 6 HOURS PRN
Status: DISCONTINUED | OUTPATIENT
Start: 2024-08-16 | End: 2024-08-16

## 2024-08-16 RX ORDER — GLUCAGON 1 MG
1 KIT INJECTION
Status: DISCONTINUED | OUTPATIENT
Start: 2024-08-16 | End: 2024-08-17 | Stop reason: HOSPADM

## 2024-08-16 RX ORDER — DEXTROSE MONOHYDRATE AND SODIUM CHLORIDE 5; .9 G/100ML; G/100ML
1000 INJECTION, SOLUTION INTRAVENOUS
Status: DISCONTINUED | OUTPATIENT
Start: 2024-08-16 | End: 2024-08-16

## 2024-08-16 RX ORDER — HYDRALAZINE HYDROCHLORIDE 20 MG/ML
10 INJECTION INTRAMUSCULAR; INTRAVENOUS EVERY 6 HOURS PRN
Status: DISCONTINUED | OUTPATIENT
Start: 2024-08-16 | End: 2024-08-17 | Stop reason: HOSPADM

## 2024-08-16 RX ORDER — ROCURONIUM BROMIDE 10 MG/ML
INJECTION, SOLUTION INTRAVENOUS
Status: DISCONTINUED | OUTPATIENT
Start: 2024-08-16 | End: 2024-08-16

## 2024-08-16 RX ORDER — PROPRANOLOL HYDROCHLORIDE 40 MG/1
80 TABLET ORAL 2 TIMES DAILY
Status: DISCONTINUED | OUTPATIENT
Start: 2024-08-16 | End: 2024-08-17 | Stop reason: HOSPADM

## 2024-08-16 RX ORDER — MORPHINE SULFATE 4 MG/ML
2 INJECTION, SOLUTION INTRAMUSCULAR; INTRAVENOUS EVERY 4 HOURS PRN
Status: DISCONTINUED | OUTPATIENT
Start: 2024-08-16 | End: 2024-08-17 | Stop reason: HOSPADM

## 2024-08-16 RX ORDER — ONDANSETRON HYDROCHLORIDE 2 MG/ML
INJECTION, SOLUTION INTRAVENOUS
Status: DISCONTINUED | OUTPATIENT
Start: 2024-08-16 | End: 2024-08-16

## 2024-08-16 RX ORDER — LIDOCAINE HYDROCHLORIDE 20 MG/ML
INJECTION INTRAVENOUS
Status: DISCONTINUED | OUTPATIENT
Start: 2024-08-16 | End: 2024-08-16

## 2024-08-16 RX ORDER — FENTANYL CITRATE 50 UG/ML
INJECTION, SOLUTION INTRAMUSCULAR; INTRAVENOUS
Status: DISCONTINUED | OUTPATIENT
Start: 2024-08-16 | End: 2024-08-16

## 2024-08-16 RX ORDER — MODAFINIL 100 MG/1
200 TABLET ORAL DAILY
Status: DISCONTINUED | OUTPATIENT
Start: 2024-08-17 | End: 2024-08-17 | Stop reason: HOSPADM

## 2024-08-16 RX ORDER — PAROXETINE 10 MG/1
20 TABLET, FILM COATED ORAL DAILY
Status: DISCONTINUED | OUTPATIENT
Start: 2024-08-17 | End: 2024-08-17 | Stop reason: HOSPADM

## 2024-08-16 RX ORDER — AMLODIPINE BESYLATE 5 MG/1
5 TABLET ORAL DAILY
Status: DISCONTINUED | OUTPATIENT
Start: 2024-08-17 | End: 2024-08-17 | Stop reason: HOSPADM

## 2024-08-16 RX ORDER — SODIUM CHLORIDE 9 MG/ML
INJECTION, SOLUTION INTRAVENOUS CONTINUOUS
Status: DISCONTINUED | OUTPATIENT
Start: 2024-08-16 | End: 2024-08-17 | Stop reason: HOSPADM

## 2024-08-16 RX ORDER — IBUPROFEN 200 MG
16 TABLET ORAL
Status: DISCONTINUED | OUTPATIENT
Start: 2024-08-16 | End: 2024-08-17 | Stop reason: HOSPADM

## 2024-08-16 RX ORDER — GLUCAGON 1 MG
1 KIT INJECTION
Status: DISCONTINUED | OUTPATIENT
Start: 2024-08-16 | End: 2024-08-16 | Stop reason: HOSPADM

## 2024-08-16 RX ORDER — OXYCODONE AND ACETAMINOPHEN 5; 325 MG/1; MG/1
1 TABLET ORAL
Status: DISCONTINUED | OUTPATIENT
Start: 2024-08-16 | End: 2024-08-16 | Stop reason: HOSPADM

## 2024-08-16 RX ORDER — SODIUM CHLORIDE 0.9 % (FLUSH) 0.9 %
10 SYRINGE (ML) INJECTION EVERY 12 HOURS PRN
Status: DISCONTINUED | OUTPATIENT
Start: 2024-08-16 | End: 2024-08-17 | Stop reason: HOSPADM

## 2024-08-16 RX ORDER — LIDOCAINE HYDROCHLORIDE 10 MG/ML
INJECTION, SOLUTION EPIDURAL; INFILTRATION; INTRACAUDAL; PERINEURAL
Status: DISCONTINUED | OUTPATIENT
Start: 2024-08-16 | End: 2024-08-16 | Stop reason: HOSPADM

## 2024-08-16 RX ORDER — HYDROMORPHONE HYDROCHLORIDE 2 MG/ML
0.2 INJECTION, SOLUTION INTRAMUSCULAR; INTRAVENOUS; SUBCUTANEOUS EVERY 5 MIN PRN
Status: DISCONTINUED | OUTPATIENT
Start: 2024-08-16 | End: 2024-08-16 | Stop reason: HOSPADM

## 2024-08-16 RX ORDER — NALOXONE HCL 0.4 MG/ML
0.02 VIAL (ML) INJECTION
Status: DISCONTINUED | OUTPATIENT
Start: 2024-08-16 | End: 2024-08-17 | Stop reason: HOSPADM

## 2024-08-16 RX ORDER — IBUPROFEN 200 MG
24 TABLET ORAL
Status: DISCONTINUED | OUTPATIENT
Start: 2024-08-16 | End: 2024-08-17 | Stop reason: HOSPADM

## 2024-08-16 RX ORDER — ONDANSETRON HYDROCHLORIDE 2 MG/ML
4 INJECTION, SOLUTION INTRAVENOUS DAILY PRN
Status: DISCONTINUED | OUTPATIENT
Start: 2024-08-16 | End: 2024-08-16 | Stop reason: HOSPADM

## 2024-08-16 RX ORDER — GABAPENTIN 100 MG/1
100 CAPSULE ORAL 3 TIMES DAILY
Status: DISCONTINUED | OUTPATIENT
Start: 2024-08-16 | End: 2024-08-17 | Stop reason: HOSPADM

## 2024-08-16 RX ORDER — SUCCINYLCHOLINE CHLORIDE 20 MG/ML
INJECTION INTRAMUSCULAR; INTRAVENOUS
Status: DISCONTINUED | OUTPATIENT
Start: 2024-08-16 | End: 2024-08-16

## 2024-08-16 RX ORDER — BUPIVACAINE HYDROCHLORIDE 2.5 MG/ML
INJECTION, SOLUTION EPIDURAL; INFILTRATION; INTRACAUDAL
Status: DISCONTINUED | OUTPATIENT
Start: 2024-08-16 | End: 2024-08-16 | Stop reason: HOSPADM

## 2024-08-16 RX ORDER — PROPOFOL 10 MG/ML
VIAL (ML) INTRAVENOUS
Status: DISCONTINUED | OUTPATIENT
Start: 2024-08-16 | End: 2024-08-16

## 2024-08-16 RX ORDER — ONDANSETRON HYDROCHLORIDE 2 MG/ML
4 INJECTION, SOLUTION INTRAVENOUS EVERY 6 HOURS PRN
Status: DISCONTINUED | OUTPATIENT
Start: 2024-08-16 | End: 2024-08-16 | Stop reason: SDUPTHER

## 2024-08-16 RX ORDER — CEFAZOLIN SODIUM 1 G/3ML
INJECTION, POWDER, FOR SOLUTION INTRAMUSCULAR; INTRAVENOUS
Status: DISCONTINUED | OUTPATIENT
Start: 2024-08-16 | End: 2024-08-16

## 2024-08-16 RX ORDER — INDOCYANINE GREEN AND WATER 25 MG
KIT INJECTION
Status: DISCONTINUED | OUTPATIENT
Start: 2024-08-16 | End: 2024-08-16

## 2024-08-16 RX ORDER — MORPHINE SULFATE 4 MG/ML
2 INJECTION, SOLUTION INTRAMUSCULAR; INTRAVENOUS EVERY 6 HOURS PRN
Status: DISCONTINUED | OUTPATIENT
Start: 2024-08-16 | End: 2024-08-16

## 2024-08-16 RX ORDER — GLUCAGON 1 MG
1 KIT INJECTION
Status: DISCONTINUED | OUTPATIENT
Start: 2024-08-16 | End: 2024-08-16 | Stop reason: SDUPTHER

## 2024-08-16 RX ORDER — LEVOTHYROXINE SODIUM 100 UG/1
200 TABLET ORAL
Status: DISCONTINUED | OUTPATIENT
Start: 2024-08-17 | End: 2024-08-17 | Stop reason: HOSPADM

## 2024-08-16 RX ORDER — INSULIN ASPART 100 [IU]/ML
0-5 INJECTION, SOLUTION INTRAVENOUS; SUBCUTANEOUS
Status: DISCONTINUED | OUTPATIENT
Start: 2024-08-16 | End: 2024-08-17 | Stop reason: HOSPADM

## 2024-08-16 RX ADMIN — ATORVASTATIN CALCIUM 20 MG: 10 TABLET, FILM COATED ORAL at 08:08

## 2024-08-16 RX ADMIN — FENTANYL CITRATE 50 MCG: 50 INJECTION, SOLUTION INTRAMUSCULAR; INTRAVENOUS at 03:08

## 2024-08-16 RX ADMIN — HYDROCODONE BITARTRATE AND ACETAMINOPHEN 1 TABLET: 10; 325 TABLET ORAL at 08:08

## 2024-08-16 RX ADMIN — HYDROMORPHONE HYDROCHLORIDE 0.2 MG: 2 INJECTION INTRAMUSCULAR; INTRAVENOUS; SUBCUTANEOUS at 04:08

## 2024-08-16 RX ADMIN — ROCURONIUM BROMIDE 45 MG: 10 INJECTION, SOLUTION INTRAVENOUS at 01:08

## 2024-08-16 RX ADMIN — INDOCYANINE GREEN AND WATER 2.5 MG: KIT at 01:08

## 2024-08-16 RX ADMIN — ROCURONIUM BROMIDE 5 MG: 10 INJECTION, SOLUTION INTRAVENOUS at 01:08

## 2024-08-16 RX ADMIN — PROPOFOL 150 MG: 10 INJECTION, EMULSION INTRAVENOUS at 01:08

## 2024-08-16 RX ADMIN — HYDROMORPHONE HYDROCHLORIDE 0.2 MG: 2 INJECTION INTRAMUSCULAR; INTRAVENOUS; SUBCUTANEOUS at 03:08

## 2024-08-16 RX ADMIN — ONDANSETRON 4 MG: 2 INJECTION INTRAMUSCULAR; INTRAVENOUS at 03:08

## 2024-08-16 RX ADMIN — PIPERACILLIN SODIUM AND TAZOBACTAM SODIUM 4.5 G: 4; .5 INJECTION, POWDER, FOR SOLUTION INTRAVENOUS at 05:08

## 2024-08-16 RX ADMIN — SUGAMMADEX 200 MG: 100 INJECTION, SOLUTION INTRAVENOUS at 03:08

## 2024-08-16 RX ADMIN — SUCCINYLCHOLINE CHLORIDE 140 MG: 20 INJECTION, SOLUTION INTRAMUSCULAR; INTRAVENOUS; PARENTERAL at 01:08

## 2024-08-16 RX ADMIN — SODIUM CHLORIDE: 9 INJECTION, SOLUTION INTRAVENOUS at 04:08

## 2024-08-16 RX ADMIN — CEFAZOLIN 2 G: 330 INJECTION, POWDER, FOR SOLUTION INTRAMUSCULAR; INTRAVENOUS at 02:08

## 2024-08-16 RX ADMIN — PROPRANOLOL HYDROCHLORIDE 80 MG: 40 TABLET ORAL at 10:08

## 2024-08-16 RX ADMIN — PIPERACILLIN SODIUM AND TAZOBACTAM SODIUM 4.5 G: 4; .5 INJECTION, POWDER, FOR SOLUTION INTRAVENOUS at 01:08

## 2024-08-16 RX ADMIN — SODIUM CHLORIDE, POTASSIUM CHLORIDE, SODIUM LACTATE AND CALCIUM CHLORIDE: 600; 310; 30; 20 INJECTION, SOLUTION INTRAVENOUS at 01:08

## 2024-08-16 RX ADMIN — SODIUM CHLORIDE, SODIUM LACTATE, POTASSIUM CHLORIDE, AND CALCIUM CHLORIDE 1000 ML: .6; .31; .03; .02 INJECTION, SOLUTION INTRAVENOUS at 02:08

## 2024-08-16 RX ADMIN — PIPERACILLIN SODIUM AND TAZOBACTAM SODIUM 4.5 G: 4; .5 INJECTION, POWDER, FOR SOLUTION INTRAVENOUS at 09:08

## 2024-08-16 RX ADMIN — GABAPENTIN 100 MG: 100 CAPSULE ORAL at 08:08

## 2024-08-16 RX ADMIN — LIDOCAINE HYDROCHLORIDE 100 MG: 20 INJECTION INTRAVENOUS at 01:08

## 2024-08-16 RX ADMIN — ROCURONIUM BROMIDE 30 MG: 10 INJECTION, SOLUTION INTRAVENOUS at 02:08

## 2024-08-16 NOTE — CONSULTS
[facility] - Emergency Dept.  General Surgery  Consult Note    Patient Name: Wilman Neri  MRN: [id number]  Code Status: Full Code  Admission Date: 8/15/2024  Hospital Length of Stay: 0 days  Attending Physician: Joe Guaman MD  Primary Care Provider: Delores Lazar MD    Patient information was obtained from patient, past medical records, ER records, and primary team.     Inpatient consult to General surgery  Consult performed by: Stormy Chandra MD  Consult ordered by: Harinder Urias MD  Reason for consult: cholecystitis        Subjective:     Principal Problem: Acute cholecystitis    History of Present Illness: Wilman Neri is a 54 y.o. male who presented to the emergency department with a several day history of abdominal pain.  He states the pain started on Tuesday night and has been increasing in severity.  He has no history of similar symptoms.  He did try some laxatives in the like but without any relief.  He does admit to chills however no nausea or vomiting.  Upon presentation to the emergency department his temperature was a 100° F and he was mildly tachycardic at 1:14 a.m..  His blood pressure was okay.  Workup in the ER revealed a leukocytosis of 16 K and labs were otherwise normal including his LFTs.  CT scan was performed to rule out diverticulitis and showed numerous stones within the gallbladder consistent with gallstones. Mild gallbladder wall thickening with slight fat stranding may relate to cholecystitis. Subsequent RUQ US showed stones and wall thickening concerning for cholecystitis.  He was admitted to hospital medicine.    No current facility-administered medications on file prior to encounter.     Current Outpatient Medications on File Prior to Encounter   Medication Sig    amLODIPine (NORVASC) 5 MG tablet Take 1 tablet by mouth once daily    atorvastatin (LIPITOR) 20 MG tablet Take 1 tablet by mouth once daily    butalbital-acetaminophen-caffeine -40 mg (FIORICET, ESGIC)  -40 mg per tablet Take 1 tablet by mouth every 4 (four) hours as needed for Headaches.    eszopiclone (LUNESTA) 2 MG Tab TAKE 1 TABLET BY MOUTH ONCE DAILY IN THE EVENING    gabapentin (NEURONTIN) 100 MG capsule Take 1 capsule (100 mg total) by mouth 3 (three) times daily.    levothyroxine (SYNTHROID) 200 MCG tablet Take 1 tablet (200 mcg total) by mouth once daily.    lisinopriL-hydrochlorothiazide (PRINZIDE,ZESTORETIC) 20-12.5 mg per tablet Take 1 tablet by mouth once daily.    melatonin (MELATIN) 5 mg Take 5 mg by mouth every evening.    modafiniL (PROVIGIL) 200 MG Tab Take 1 tablet by mouth once daily    paroxetine (PAXIL) 20 MG tablet Take 1 tablet by mouth once daily    propranoloL (INDERAL LA) 120 MG 24 hr capsule Take 1 capsule by mouth once daily    blood sugar diagnostic Strp To check BG 2 times daily, to use with insurance preferred meter    blood-glucose meter kit To check BG 2 times daily, to use with insurance preferred meter    co-enzyme Q-10 30 mg capsule Take 30 mg by mouth 3 (three) times daily.    lancets Misc To check BG 2 times daily, to use with insurance preferred meter    sildenafil (VIAGRA) 50 MG tablet Take 1 tablet (50 mg total) by mouth daily as needed for Erectile Dysfunction.    tirzepatide (MOUNJARO) 2.5 mg/0.5 mL PnIj Inject 2.5 mg into the skin once a week. (Patient taking differently: Inject 2.5 mg into the skin once a week. (Fridays))    [DISCONTINUED] atorvastatin (LIPITOR) 20 MG tablet Take 1 tablet (20 mg total) by mouth once daily.    [DISCONTINUED] eszopiclone (LUNESTA) 2 MG Tab Take 1 tablet (2 mg total) by mouth every evening.       Review of patient's allergies indicates:  No Known Allergies    Past Medical History:   Diagnosis Date    Anxiety     Depression     Diabetes mellitus 06/2017    BS doesn't check 06/02/2023    Hypertension     Hypothyroid     Sleep disorder     Squamous cell carcinoma of skin 10/2019    right shoulder    Stroke      Past Surgical History:    Procedure Laterality Date    BACK SURGERY      2 juan pablo 6 bolts lower back     COLONOSCOPY N/A 3/7/2019    Procedure: COLONOSCOPY;  Surgeon: Estephanie Koch MD;  Location: [facility];  Service: Endoscopy;  Laterality: N/A;    EXTERNAL EAR SURGERY      MODIFIED RADICAL MASTECTOMY W/ AXILLARY LYMPH NODE DISSECTION       Family History       Problem Relation (Age of Onset)    Cancer Mother, Father    Cataracts Mother, Father    Diabetes Maternal Grandmother    Macular degeneration Father          Tobacco Use    Smoking status: Former     Current packs/day: 1.00     Types: Vaping w/o nicotine, Cigarettes    Smokeless tobacco: Never    Tobacco comments:     vape    Substance and Sexual Activity    Alcohol use: No     Comment: couple times a year    Drug use: No    Sexual activity: Yes     Partners: Female     Birth control/protection: None     Review of Systems   Constitutional:  Positive for chills and fever.   Gastrointestinal:  Positive for abdominal distention and abdominal pain. Negative for constipation, diarrhea, nausea and vomiting.   Genitourinary:  Negative for dysuria and hematuria.   All other systems reviewed and are negative.    Objective:     Vital Signs (Most Recent):  Temp: 98.9 °F (37.2 °C) (08/16/24 0454)  Pulse: 97 (08/16/24 1000)  Resp: 20 (08/16/24 1000)  BP: 117/68 (08/16/24 1000)  SpO2: 96 % (08/16/24 1000) Vital Signs (24h Range):  Temp:  [98.9 °F (37.2 °C)-100 °F (37.8 °C)] 98.9 °F (37.2 °C)  Pulse:  [] 97  Resp:  [16-20] 20  SpO2:  [95 %-98 %] 96 %  BP: (117-154)/(65-79) 117/68     Weight: 98 kg (216 lb)  Body mass index is 30.99 kg/m².     Physical Exam  Constitutional:       General: He is not in acute distress.     Appearance: Normal appearance.   HENT:      Head: Normocephalic and atraumatic.   Eyes:      Extraocular Movements: Extraocular movements intact.      Conjunctiva/sclera: Conjunctivae normal.   Cardiovascular:      Rate and Rhythm: Normal rate and regular rhythm.   Pulmonary:       Effort: Pulmonary effort is normal.   Abdominal:      General: Abdomen is flat. There is no distension.      Palpations: Abdomen is soft. There is no mass.      Tenderness: There is abdominal tenderness. There is no guarding or rebound.      Hernia: No hernia is present.      Comments: TTP RUQ    Musculoskeletal:         General: No swelling, tenderness, deformity or signs of injury. Normal range of motion.   Skin:     General: Skin is warm and dry.      Coloration: Skin is not jaundiced.   Neurological:      General: No focal deficit present.      Mental Status: He is alert and oriented to person, place, and time.   Psychiatric:         Mood and Affect: Mood normal.         Behavior: Behavior normal.         Thought Content: Thought content normal.            I have reviewed all pertinent lab results within the past 24 hours.  CBC:   Recent Labs   Lab 08/16/24 0411   WBC 11.48   RBC 4.51*   HGB 12.8*   HCT 37.6*      MCV 83   MCH 28.4   MCHC 34.0     BMP:   Recent Labs   Lab 08/16/24 0411         K 3.4*      CO2 24   BUN 9   CREATININE 0.9   CALCIUM 9.0     CMP:   Recent Labs   Lab 08/16/24 0411      CALCIUM 9.0   ALBUMIN 3.0*   PROT 6.1      K 3.4*   CO2 24      BUN 9   CREATININE 0.9   ALKPHOS 51*   ALT 21   AST 12   BILITOT 1.0     LFTs:   Recent Labs   Lab 08/16/24 0411   ALT 21   AST 12   ALKPHOS 51*   BILITOT 1.0   PROT 6.1   ALBUMIN 3.0*       Significant Diagnostics:  I have reviewed all pertinent imaging results/findings within the past 24 hours.  CT: I have reviewed all pertinent results/findings within the past 24 hours and my personal findings are:  stones  U/S: I have reviewed all pertinent results/findings within the past 24 hours and my personal findings are:  wall thickening and stones    Imaging Results              US Abdomen Limited (Gallbladder) (Final result)  Result time 08/16/24 01:50:40      Final result by Lang Duncan MD  (08/16/24 01:50:40)                   Impression:      Sonographic findings concerning for acute cholecystitis      Electronically signed by: Lang Duncan  Date:    08/16/2024  Time:    01:50               Narrative:    EXAMINATION:  US ABDOMEN LIMITED    CLINICAL HISTORY:  Unspecified abdominal pain    TECHNIQUE:  Limited ultrasound of the right upper quadrant of the abdomen.    COMPARISON:  CT 08/15/2024    FINDINGS:  Cholelithiasis with wall thickening measuring up to 5 mm.  Sonographic Liao sign is reported as negative (please correlate for recent administration of pain medication)..  Common bile duct measures 8 mm.    Enlarged and fatty liver.  Liver measures 16.5 cm.    Right nephrolithiasis.                                       CT Abdomen Pelvis With IV Contrast NO Oral Contrast (Final result)  Result time 08/15/24 19:50:58      Final result by Elbert Hughes MD (08/15/24 19:50:58)                   Impression:      Numerous stones within the gallbladder consistent with gallstones.  Mild gallbladder wall thickening with slight fat stranding may relate to cholecystitis. Correlate clinically.    Remaining findings as above    All CT scans at this facility use dose modulation, iterative reconstruction, and/or weight based dosing when appropriate to reduce radiation dose to as low as reasonably achievable.      Electronically signed by: Elbert Hughes  Date:    08/15/2024  Time:    19:50               Narrative:    EXAMINATION:  CT ABDOMEN PELVIS WITH IV CONTRAST    CLINICAL HISTORY:  Abdominal abscess/infection suspected;LLQ abdominal pain;    TECHNIQUE:  Low dose axial images, sagittal and coronal reformations were obtained from the lung bases to the pubic symphysis following the IV administration of 100 mL of Omnipaque 350.    COMPARISON:  None    FINDINGS:  Numerous stones within the gallbladder consistent with gallstones.  Mild gallbladder wall thickening with slight fat stranding may relate to  cholecystitis. Correlate clinically.    Fatty infiltration liver.  Subcentimeter hypodense lesion of the left lobe of the liver incompletely evaluated.  No evidence for stone within the common bile duct with a 7 mm maximum diameter.  Mild hepatomegaly.  No hydronephrosis.  Punctate nonobstructing bilateral renal calculi.  No evidence for appendicitis.  No bowel obstruction.  Status post posterior juan pablo fixation at L4-L5 S1.  Atherosclerotic changes of the aorta iliac vasculature.  No adrenal masses.  Spleen pancreas are grossly unremarkable.  Bladder is mildly distended.  Calcification along the anterior dorsal aspect of the penis may relate to Peyroniie's disease.  No vertebral body compression deformity                                       X-Ray Chest AP Portable (Final result)  Result time 08/15/24 18:53:19      Final result by Gurinder Sevilla MD (08/15/24 18:53:19)                   Impression:      1.  Negative for acute process involving the chest.    2.  Stable findings as noted above.      Electronically signed by: Gurinder Sevilla MD  Date:    08/15/2024  Time:    18:53               Narrative:    EXAMINATION:  XR CHEST AP PORTABLE    CLINICAL HISTORY:  Sepsis;    COMPARISON:  May 29, 2021    FINDINGS:  The study is lordotic in position.  The lungs are clear. The cardiac silhouette size is normal. The trachea is midline and the mediastinal width is normal. Negative for focal infiltrate, effusion or pneumothorax. Pulmonary vasculature is normal. Negative for osseous abnormalities. Marginal spondylosis.  Aortic arch calcifications.  Cardiophrenic fat pads.                                        Assessment/Plan:     * Acute cholecystitis  Imaging and history consistent with acute cholecystitis.  We discussed the differential diagnosis as well as the workup and management of acute cholecystitis.  He is still having right upper quadrant pain.  Fortunately his leukocytosis has improved with the antibiotics and  hydration.  He is interested in pursuing surgical intervention.  I discussed with him that my partner Dr. Jean Tapia is on-call today and that one of the two of us will plan to take his gallbladder out later on today.    -- NPO  -- abx  -- plan for robotic cholecystectomy today.  Consent to be obtained by operating surgeon  -- remainder of care as per primary team     Hyponatremia  -- Management as per primary team     Fatty liver  -- Management as per primary team     Hyperlipidemia  -- Management as per primary team     Diabetes mellitus type 2, noninsulin dependent  -- Management as per primary team     Right nephrolithiasis  -- Management as per primary team     Depression  -- Management as per primary team     Hypothyroidism  -- Management as per primary team     HTN (hypertension)  -- Management as per primary team       VTE Risk Mitigation (From admission, onward)           Ordered     IP VTE LOW RISK PATIENT  Once         08/16/24 0253     Place sequential compression device  Until discontinued         08/16/24 0253                    Thank you for your consult. I will follow-up with patient. Please contact us if you have any additional questions.    Stormy Chandra MD  General Surgery  [facility] - Emergency Dept.

## 2024-08-16 NOTE — PHARMACY MED REC
"Admission Medication History     The home medication history was taken by Bin Culver.    You may go to "Admission" then "Reconcile Home Medications" tabs to review and/or act upon these items.     The home medication list has been updated by the Pharmacy department.   Please read ALL comments highlighted in yellow.   Please address this information as you see fit.    Feel free to contact us if you have any questions or require assistance.      Medications listed below were obtained from: Patient/family and Analytic software- [employer]  (Not in a hospital admission)        Bin Culver  [phone]    Current Outpatient Medications on File Prior to Encounter   Medication Sig Dispense Refill Last Dose    amLODIPine (NORVASC) 5 MG tablet Take 1 tablet by mouth once daily 90 tablet 3 8/15/2024    atorvastatin (LIPITOR) 20 MG tablet Take 1 tablet by mouth once daily 90 tablet 3 8/15/2024    butalbital-acetaminophen-caffeine -40 mg (FIORICET, ESGIC) -40 mg per tablet Take 1 tablet by mouth every 4 (four) hours as needed for Headaches. 30 tablet 1 Past Month    eszopiclone (LUNESTA) 2 MG Tab TAKE 1 TABLET BY MOUTH ONCE DAILY IN THE EVENING 90 tablet 0 8/15/2024    gabapentin (NEURONTIN) 100 MG capsule Take 1 capsule (100 mg total) by mouth 3 (three) times daily. 90 capsule 11 8/15/2024    levothyroxine (SYNTHROID) 200 MCG tablet Take 1 tablet (200 mcg total) by mouth once daily. 90 tablet 3 8/15/2024    lisinopriL-hydrochlorothiazide (PRINZIDE,ZESTORETIC) 20-12.5 mg per tablet Take 1 tablet by mouth once daily. 90 tablet 3 8/15/2024    melatonin (MELATIN) 5 mg Take 5 mg by mouth every evening.   8/15/2024    modafiniL (PROVIGIL) 200 MG Tab Take 1 tablet by mouth once daily 90 tablet 0 8/15/2024    paroxetine (PAXIL) 20 MG tablet Take 1 tablet by mouth once daily 90 tablet 3 8/15/2024    propranoloL (INDERAL LA) 120 MG 24 hr capsule Take 1 capsule by mouth once daily 90 capsule 3 8/15/2024    blood " sugar diagnostic Strp To check BG 2 times daily, to use with insurance preferred meter 100 each 3     blood-glucose meter kit To check BG 2 times daily, to use with insurance preferred meter 1 each 0     co-enzyme Q-10 30 mg capsule Take 30 mg by mouth 3 (three) times daily.       lancets Misc To check BG 2 times daily, to use with insurance preferred meter 100 each 3     sildenafil (VIAGRA) 50 MG tablet Take 1 tablet (50 mg total) by mouth daily as needed for Erectile Dysfunction. 30 tablet 11     tirzepatide (MOUNJARO) 2.5 mg/0.5 mL PnIj Inject 2.5 mg into the skin once a week. (Patient taking differently: Inject 2.5 mg into the skin once a week. (Fridays)) 12 Pen 1 8/9/2024                           .

## 2024-08-16 NOTE — SUBJECTIVE & OBJECTIVE
No current facility-administered medications on file prior to encounter.     Current Outpatient Medications on File Prior to Encounter   Medication Sig    amLODIPine (NORVASC) 5 MG tablet Take 1 tablet by mouth once daily    atorvastatin (LIPITOR) 20 MG tablet Take 1 tablet by mouth once daily    butalbital-acetaminophen-caffeine -40 mg (FIORICET, ESGIC) -40 mg per tablet Take 1 tablet by mouth every 4 (four) hours as needed for Headaches.    eszopiclone (LUNESTA) 2 MG Tab TAKE 1 TABLET BY MOUTH ONCE DAILY IN THE EVENING    gabapentin (NEURONTIN) 100 MG capsule Take 1 capsule (100 mg total) by mouth 3 (three) times daily.    levothyroxine (SYNTHROID) 200 MCG tablet Take 1 tablet (200 mcg total) by mouth once daily.    lisinopriL-hydrochlorothiazide (PRINZIDE,ZESTORETIC) 20-12.5 mg per tablet Take 1 tablet by mouth once daily.    melatonin (MELATIN) 5 mg Take 5 mg by mouth every evening.    modafiniL (PROVIGIL) 200 MG Tab Take 1 tablet by mouth once daily    paroxetine (PAXIL) 20 MG tablet Take 1 tablet by mouth once daily    propranoloL (INDERAL LA) 120 MG 24 hr capsule Take 1 capsule by mouth once daily    blood sugar diagnostic Strp To check BG 2 times daily, to use with insurance preferred meter    blood-glucose meter kit To check BG 2 times daily, to use with insurance preferred meter    co-enzyme Q-10 30 mg capsule Take 30 mg by mouth 3 (three) times daily.    lancets Misc To check BG 2 times daily, to use with insurance preferred meter    sildenafil (VIAGRA) 50 MG tablet Take 1 tablet (50 mg total) by mouth daily as needed for Erectile Dysfunction.    tirzepatide (MOUNJARO) 2.5 mg/0.5 mL PnIj Inject 2.5 mg into the skin once a week. (Patient taking differently: Inject 2.5 mg into the skin once a week. (Fridays))    [DISCONTINUED] atorvastatin (LIPITOR) 20 MG tablet Take 1 tablet (20 mg total) by mouth once daily.    [DISCONTINUED] eszopiclone (LUNESTA) 2 MG Tab Take 1 tablet (2 mg total) by mouth  every evening.       Review of patient's allergies indicates:  No Known Allergies    Past Medical History:   Diagnosis Date    Anxiety     Depression     Diabetes mellitus 06/2017    BS doesn't check 06/02/2023    Hypertension     Hypothyroid     Sleep disorder     Squamous cell carcinoma of skin 10/2019    right shoulder    Stroke      Past Surgical History:   Procedure Laterality Date    BACK SURGERY      2 juan pablo 6 bolts lower back     COLONOSCOPY N/A 3/7/2019    Procedure: COLONOSCOPY;  Surgeon: Estephanie Koch MD;  Location: [facility];  Service: Endoscopy;  Laterality: N/A;    EXTERNAL EAR SURGERY      MODIFIED RADICAL MASTECTOMY W/ AXILLARY LYMPH NODE DISSECTION       Family History       Problem Relation (Age of Onset)    Cancer Mother, Father    Cataracts Mother, Father    Diabetes Maternal Grandmother    Macular degeneration Father          Tobacco Use    Smoking status: Former     Current packs/day: 1.00     Types: Vaping w/o nicotine, Cigarettes    Smokeless tobacco: Never    Tobacco comments:     vape    Substance and Sexual Activity    Alcohol use: No     Comment: couple times a year    Drug use: No    Sexual activity: Yes     Partners: Female     Birth control/protection: None     Review of Systems   Constitutional:  Positive for chills and fever.   Gastrointestinal:  Positive for abdominal distention and abdominal pain. Negative for constipation, diarrhea, nausea and vomiting.   Genitourinary:  Negative for dysuria and hematuria.   All other systems reviewed and are negative.    Objective:     Vital Signs (Most Recent):  Temp: 98.9 °F (37.2 °C) (08/16/24 0454)  Pulse: 97 (08/16/24 1000)  Resp: 20 (08/16/24 1000)  BP: 117/68 (08/16/24 1000)  SpO2: 96 % (08/16/24 1000) Vital Signs (24h Range):  Temp:  [98.9 °F (37.2 °C)-100 °F (37.8 °C)] 98.9 °F (37.2 °C)  Pulse:  [] 97  Resp:  [16-20] 20  SpO2:  [95 %-98 %] 96 %  BP: (117-154)/(65-79) 117/68     Weight: 98 kg (216 lb)  Body mass index is 30.99  kg/m².     Physical Exam  Constitutional:       General: He is not in acute distress.     Appearance: Normal appearance.   HENT:      Head: Normocephalic and atraumatic.   Eyes:      Extraocular Movements: Extraocular movements intact.      Conjunctiva/sclera: Conjunctivae normal.   Cardiovascular:      Rate and Rhythm: Normal rate and regular rhythm.   Pulmonary:      Effort: Pulmonary effort is normal.   Abdominal:      General: Abdomen is flat. There is no distension.      Palpations: Abdomen is soft. There is no mass.      Tenderness: There is abdominal tenderness. There is no guarding or rebound.      Hernia: No hernia is present.      Comments: TTP RUQ    Musculoskeletal:         General: No swelling, tenderness, deformity or signs of injury. Normal range of motion.   Skin:     General: Skin is warm and dry.      Coloration: Skin is not jaundiced.   Neurological:      General: No focal deficit present.      Mental Status: He is alert and oriented to person, place, and time.   Psychiatric:         Mood and Affect: Mood normal.         Behavior: Behavior normal.         Thought Content: Thought content normal.            I have reviewed all pertinent lab results within the past 24 hours.  CBC:   Recent Labs   Lab 08/16/24 0411   WBC 11.48   RBC 4.51*   HGB 12.8*   HCT 37.6*      MCV 83   MCH 28.4   MCHC 34.0     BMP:   Recent Labs   Lab 08/16/24 0411         K 3.4*      CO2 24   BUN 9   CREATININE 0.9   CALCIUM 9.0     CMP:   Recent Labs   Lab 08/16/24 0411      CALCIUM 9.0   ALBUMIN 3.0*   PROT 6.1      K 3.4*   CO2 24      BUN 9   CREATININE 0.9   ALKPHOS 51*   ALT 21   AST 12   BILITOT 1.0     LFTs:   Recent Labs   Lab 08/16/24 0411   ALT 21   AST 12   ALKPHOS 51*   BILITOT 1.0   PROT 6.1   ALBUMIN 3.0*       Significant Diagnostics:  I have reviewed all pertinent imaging results/findings within the past 24 hours.  CT: I have reviewed all pertinent  results/findings within the past 24 hours and my personal findings are:  stones  U/S: I have reviewed all pertinent results/findings within the past 24 hours and my personal findings are:  wall thickening and stones    Imaging Results              US Abdomen Limited (Gallbladder) (Final result)  Result time 08/16/24 01:50:40      Final result by Lang Duncan MD (08/16/24 01:50:40)                   Impression:      Sonographic findings concerning for acute cholecystitis      Electronically signed by: Lang Duncan  Date:    08/16/2024  Time:    01:50               Narrative:    EXAMINATION:  US ABDOMEN LIMITED    CLINICAL HISTORY:  Unspecified abdominal pain    TECHNIQUE:  Limited ultrasound of the right upper quadrant of the abdomen.    COMPARISON:  CT 08/15/2024    FINDINGS:  Cholelithiasis with wall thickening measuring up to 5 mm.  Sonographic Liao sign is reported as negative (please correlate for recent administration of pain medication)..  Common bile duct measures 8 mm.    Enlarged and fatty liver.  Liver measures 16.5 cm.    Right nephrolithiasis.                                       CT Abdomen Pelvis With IV Contrast NO Oral Contrast (Final result)  Result time 08/15/24 19:50:58      Final result by Elbert Hughes MD (08/15/24 19:50:58)                   Impression:      Numerous stones within the gallbladder consistent with gallstones.  Mild gallbladder wall thickening with slight fat stranding may relate to cholecystitis. Correlate clinically.    Remaining findings as above    All CT scans at this facility use dose modulation, iterative reconstruction, and/or weight based dosing when appropriate to reduce radiation dose to as low as reasonably achievable.      Electronically signed by: Elbert Hughes  Date:    08/15/2024  Time:    19:50               Narrative:    EXAMINATION:  CT ABDOMEN PELVIS WITH IV CONTRAST    CLINICAL HISTORY:  Abdominal abscess/infection suspected;LLQ abdominal  pain;    TECHNIQUE:  Low dose axial images, sagittal and coronal reformations were obtained from the lung bases to the pubic symphysis following the IV administration of 100 mL of Omnipaque 350.    COMPARISON:  None    FINDINGS:  Numerous stones within the gallbladder consistent with gallstones.  Mild gallbladder wall thickening with slight fat stranding may relate to cholecystitis. Correlate clinically.    Fatty infiltration liver.  Subcentimeter hypodense lesion of the left lobe of the liver incompletely evaluated.  No evidence for stone within the common bile duct with a 7 mm maximum diameter.  Mild hepatomegaly.  No hydronephrosis.  Punctate nonobstructing bilateral renal calculi.  No evidence for appendicitis.  No bowel obstruction.  Status post posterior juan pablo fixation at L4-L5 S1.  Atherosclerotic changes of the aorta iliac vasculature.  No adrenal masses.  Spleen pancreas are grossly unremarkable.  Bladder is mildly distended.  Calcification along the anterior dorsal aspect of the penis may relate to Peyroniie's disease.  No vertebral body compression deformity                                       X-Ray Chest AP Portable (Final result)  Result time 08/15/24 18:53:19      Final result by Gurinder Sevilla MD (08/15/24 18:53:19)                   Impression:      1.  Negative for acute process involving the chest.    2.  Stable findings as noted above.      Electronically signed by: Gurinder Sevilla MD  Date:    08/15/2024  Time:    18:53               Narrative:    EXAMINATION:  XR CHEST AP PORTABLE    CLINICAL HISTORY:  Sepsis;    COMPARISON:  May 29, 2021    FINDINGS:  The study is lordotic in position.  The lungs are clear. The cardiac silhouette size is normal. The trachea is midline and the mediastinal width is normal. Negative for focal infiltrate, effusion or pneumothorax. Pulmonary vasculature is normal. Negative for osseous abnormalities. Marginal spondylosis.  Aortic arch calcifications.  Cardiophrenic  fat pads.

## 2024-08-16 NOTE — ED PROVIDER NOTES
SCRIBE #1 NOTE: I, Ezequiel Toscano, am scribing for, and in the presence of, Harinder Urias MD. I have scribed the entire note.       History     Chief Complaint   Patient presents with    Abdominal Pain     Pt c/o LLQ since Tuesday, states that abd is distended. Denies n/v/d    Chills     Review of patient's allergies indicates:  No Known Allergies      History of Present Illness     HPI    8/16/2024, 12:42 AM  History obtained from the patient      History of Present Illness: Wilman Neri is a 54 y.o. male patient with a PMHx of anxiety, depression, HTN, stroke, DM, hypothyroidism, and squamous cell carcinoma of skin who presents to the Emergency Department for evaluation of LLQ abd pain which onset gradually last Tuesday and has subsided upon initial eval. No mitigating or exacerbating factors reported. Associated sxs include abd distention and a low grade fever (100º). Patient denies any constipation, dysuria, n/v/d, appetite change, back pain, testicle pain, cough, hematuria, and all other sxs at this time. Pt states it may be his gallstones. Pt is not on blood thinners. No further complaints or concerns at this time.       Arrival mode: Personal vehicle    PCP: Delores Lazar MD        Past Medical History:  Past Medical History:   Diagnosis Date    Anxiety     Depression     Diabetes mellitus 06/2017    BS doesn't check 06/02/2023    Hypertension     Hypothyroid     Sleep disorder     Squamous cell carcinoma of skin 10/2019    right shoulder    Stroke        Past Surgical History:  Past Surgical History:   Procedure Laterality Date    BACK SURGERY      2 juan pablo 6 bolts lower back     COLONOSCOPY N/A 3/7/2019    Procedure: COLONOSCOPY;  Surgeon: Estephanie Koch MD;  Location: [facility];  Service: Endoscopy;  Laterality: N/A;    EXTERNAL EAR SURGERY      MODIFIED RADICAL MASTECTOMY W/ AXILLARY LYMPH NODE DISSECTION      ROBOT-ASSISTED CHOLECYSTECTOMY USING DA LAUREN XI N/A 8/16/2024    Procedure: XI ROBOTIC  CHOLECYSTECTOMY;  Surgeon: Jean Tapia MD;  Location: [facility];  Service: General;  Laterality: N/A;         Family History:  Family History   Problem Relation Name Age of Onset    Cancer Mother      Cataracts Mother      Cancer Father      Cataracts Father      Macular degeneration Father      Diabetes Maternal Grandmother      Blindness Neg Hx      Glaucoma Neg Hx      Hypertension Neg Hx      Retinal detachment Neg Hx      Strabismus Neg Hx      Stroke Neg Hx      Thyroid disease Neg Hx         Social History:  Social History     Tobacco Use    Smoking status: Former     Current packs/day: 1.00     Types: Vaping w/o nicotine, Cigarettes    Smokeless tobacco: Never    Tobacco comments:     vape    Substance and Sexual Activity    Alcohol use: No     Comment: couple times a year    Drug use: No    Sexual activity: Yes     Partners: Female     Birth control/protection: None        Review of Systems     Review of Systems   Constitutional:  Positive for fever (low grade (100º)). Negative for appetite change.   HENT:  Negative for sore throat.    Respiratory:  Negative for cough and shortness of breath.    Cardiovascular:  Negative for chest pain.   Gastrointestinal:  Positive for abdominal distention and abdominal pain. Negative for constipation, diarrhea, nausea and vomiting.   Genitourinary:  Negative for dysuria, hematuria and testicular pain.   Musculoskeletal:  Negative for back pain.   Skin:  Negative for rash.   Neurological:  Negative for weakness.   Hematological:  Does not bruise/bleed easily.   All other systems reviewed and are negative.     Physical Exam     Initial Vitals [08/15/24 1625]   BP Pulse Resp Temp SpO2   121/65 (!) 114 20 100 °F (37.8 °C) 97 %      MAP       --          Physical Exam  Nursing Notes and Vital Signs Reviewed.  Constitutional: Patient is in no acute distress. Well-developed and well-nourished.  Head: Atraumatic. Normocephalic.  Eyes: PERRL. EOM intact. Conjunctivae are not  pale. No scleral icterus.  ENT: Mucous membranes are moist. Oropharynx is clear and symmetric.    Neck: Supple. Full ROM. No lymphadenopathy.  Cardiovascular: Regular rate. Regular rhythm. No murmurs, rubs, or gallops. Distal pulses are 2+ and symmetric.  Pulmonary/Chest: No respiratory distress. Clear to auscultation bilaterally. No wheezing or rales.  Abdominal: Soft and non-distended.  There is LLQ tenderness.  No rebound, guarding, or rigidity.   Musculoskeletal: Moves all extremities. No obvious deformities. No edema. No calf tenderness.  Skin: Warm and dry.  Neurological:  Alert, awake, and appropriate.  Normal speech.  No acute focal neurological deficits are appreciated.  Psychiatric: Normal affect. Good eye contact. Appropriate in content.     ED Course   Critical Care    Date/Time: 8/16/2024 2:05 AM    Performed by: Harinder Urias MD  Authorized by: Harinder Urias MD  Direct patient critical care time: 12 minutes  Additional history critical care time: 6 minutes  Ordering / reviewing critical care time: 7 minutes  Documentation critical care time: 4 minutes  Consulting other physicians critical care time: 5 minutes  Consult with family critical care time: 6 minutes  Other critical care time: 2 minutes  Total critical care time (exclusive of procedural time) : 42 minutes  Critical care time was exclusive of separately billable procedures and treating other patients and teaching time.  Critical care was necessary to treat or prevent imminent or life-threatening deterioration of the following conditions: sepsis (from acute cholecystitis requiring IV abx).  Critical care was time spent personally by me on the following activities: blood draw for specimens, development of treatment plan with patient or surrogate, discussions with consultants, interpretation of cardiac output measurements, review of old charts, re-evaluation of patient's condition, pulse oximetry, ordering and review of radiographic  studies, ordering and review of laboratory studies, obtaining history from patient or surrogate, examination of patient, evaluation of patient's response to treatment and ordering and performing treatments and interventions.        ED Vital Signs:  Vitals:    08/16/24 1545 08/16/24 1550 08/16/24 1554 08/16/24 1600   BP: (!) 144/76 133/76  136/73   Pulse: 101 100  98   Resp: 18 18 17 15   Temp:       TempSrc:       SpO2: (!) 92% (!) 92%  96%   Weight:       Height:        08/16/24 1605 08/16/24 1610 08/16/24 1615 08/16/24 1629   BP:   (!) 141/80 138/71   Pulse:   97 97   Resp: 12 13 (!) 27 16   Temp:    98 °F (36.7 °C)   TempSrc:    Temporal   SpO2:   97% 99%   Weight:       Height:        08/16/24 1630 08/16/24 1924 08/16/24 2021 08/17/24 0021   BP: 136/74 134/73  121/68   Pulse: 96 99  94   Resp: 17 20 18 18   Temp:  98.3 °F (36.8 °C)  98.3 °F (36.8 °C)   TempSrc:       SpO2: 97% (!) 91%  98%   Weight:       Height:        08/17/24 0352 08/17/24 0736 08/17/24 0748   BP: 128/70  132/75   Pulse: 87  108   Resp: 18 18 18   Temp: 98.3 °F (36.8 °C)  98.8 °F (37.1 °C)   TempSrc:   Oral   SpO2: 98%  (!) 92%   Weight:      Height:          Abnormal Lab Results:  Labs Reviewed   CBC W/ AUTO DIFFERENTIAL - Abnormal       Result Value    WBC 16.01 (*)     RBC 5.24      Hemoglobin 14.9      Hematocrit 44.1      MCV 84      MCH 28.4      MCHC 33.8      RDW 13.1      Platelets 215      MPV 9.0 (*)     Immature Granulocytes 0.5      Gran # (ANC) 12.1 (*)     Immature Grans (Abs) 0.08 (*)     Lymph # 2.4      Mono # 1.1 (*)     Eos # 0.2      Baso # 0.07      nRBC 0      Gran % 75.7 (*)     Lymph % 15.2 (*)     Mono % 7.1      Eosinophil % 1.1      Basophil % 0.4      Differential Method Automated     COMPREHENSIVE METABOLIC PANEL - Abnormal    Sodium 135 (*)     Potassium 4.0      Chloride 103      CO2 18 (*)     Glucose 120 (*)     BUN 10      Creatinine 1.0      Calcium 9.5      Total Protein 7.5      Albumin 3.6      Total  Bilirubin 0.8      Alkaline Phosphatase 54 (*)     AST 12      ALT 24      eGFR >60      Anion Gap 14     URINALYSIS, REFLEX TO URINE CULTURE - Abnormal    Specimen UA Urine, Clean Catch      Color, UA Colorless (*)     Appearance, UA Clear      pH, UA 7.0      Specific Gravity, UA >1.030 (*)     Protein, UA Trace (*)     Glucose, UA Negative      Ketones, UA Negative      Bilirubin (UA) Negative      Occult Blood UA Negative      Nitrite, UA Negative      Urobilinogen, UA Negative      Leukocytes, UA Negative      Narrative:     Specimen Source->Urine   PROCALCITONIN - Abnormal    Procalcitonin 0.33 (*)    APTT - Abnormal    aPTT 34.0 (*)    CBC W/ AUTO DIFFERENTIAL - Abnormal    WBC 11.48      RBC 4.51 (*)     Hemoglobin 12.8 (*)     Hematocrit 37.6 (*)     MCV 83      MCH 28.4      MCHC 34.0      RDW 12.7      Platelets 168      MPV 9.0 (*)     Immature Granulocytes 0.5      Gran # (ANC) 7.7      Immature Grans (Abs) 0.06 (*)     Lymph # 2.4      Mono # 1.0      Eos # 0.2      Baso # 0.08      nRBC 0      Gran % 67.1      Lymph % 21.2      Mono % 8.5      Eosinophil % 2.0      Basophil % 0.7      Platelet Estimate Appears normal      Hypo Moderate      Differential Method Automated     COMPREHENSIVE METABOLIC PANEL - Abnormal    Sodium 136      Potassium 3.4 (*)     Chloride 101      CO2 24      Glucose 101      BUN 9      Creatinine 0.9      Calcium 9.0      Total Protein 6.1      Albumin 3.0 (*)     Total Bilirubin 1.0      Alkaline Phosphatase 51 (*)     AST 12      ALT 21      eGFR >60      Anion Gap 11     TSH - Abnormal    TSH 0.057 (*)    HIV 1 / 2 ANTIBODY    HIV 1/2 Ag/Ab Negative      Narrative:     Release to patient->Immediate   HEPATITIS C ANTIBODY    Hepatitis C Ab Negative      Narrative:     Release to patient->Immediate   HEP C VIRUS HOLD SPECIMEN    HEP C Virus Hold Specimen Hold for HCV sendout      Narrative:     Release to patient->Immediate   LACTIC ACID, PLASMA    Lactate (Lactic Acid) 1.1      LACTIC ACID, PLASMA    Lactate (Lactic Acid) 1.0     LIPASE    Lipase 33     PROTIME-INR    Prothrombin Time 11.8      INR 1.0     T4, FREE    Free T4 1.31     POCT GLUCOSE    POCT Glucose 99          All Lab Results:  Results for orders placed or performed during the hospital encounter of 08/15/24   Blood culture x two cultures. Draw prior to antibiotics.    Specimen: Peripheral, Forearm, Left; Blood   Result Value Ref Range    Blood Culture, Routine No growth after 5 days.    Blood culture x two cultures. Draw prior to antibiotics.    Specimen: Peripheral, Forearm, Left; Blood   Result Value Ref Range    Blood Culture, Routine No growth after 5 days.    HIV 1/2 Ag/Ab (4th Gen)   Result Value Ref Range    HIV 1/2 Ag/Ab Negative Negative   Hepatitis C Antibody   Result Value Ref Range    Hepatitis C Ab Negative Negative   HCV Virus Hold Specimen   Result Value Ref Range    HEP C Virus Hold Specimen Hold for HCV sendout    CBC auto differential   Result Value Ref Range    WBC 16.01 (H) 3.90 - 12.70 K/uL    RBC 5.24 4.60 - 6.20 M/uL    Hemoglobin 14.9 14.0 - 18.0 g/dL    Hematocrit 44.1 40.0 - 54.0 %    MCV 84 82 - 98 fL    MCH 28.4 27.0 - 31.0 pg    MCHC 33.8 32.0 - 36.0 g/dL    RDW 13.1 11.5 - 14.5 %    Platelets 215 150 - 450 K/uL    MPV 9.0 (L) 9.2 - 12.9 fL    Immature Granulocytes 0.5 0.0 - 0.5 %    Gran # (ANC) 12.1 (H) 1.8 - 7.7 K/uL    Immature Grans (Abs) 0.08 (H) 0.00 - 0.04 K/uL    Lymph # 2.4 1.0 - 4.8 K/uL    Mono # 1.1 (H) 0.3 - 1.0 K/uL    Eos # 0.2 0.0 - 0.5 K/uL    Baso # 0.07 0.00 - 0.20 K/uL    nRBC 0 0 /100 WBC    Gran % 75.7 (H) 38.0 - 73.0 %    Lymph % 15.2 (L) 18.0 - 48.0 %    Mono % 7.1 4.0 - 15.0 %    Eosinophil % 1.1 0.0 - 8.0 %    Basophil % 0.4 0.0 - 1.9 %    Differential Method Automated    Comprehensive metabolic panel   Result Value Ref Range    Sodium 135 (L) 136 - 145 mmol/L    Potassium 4.0 3.5 - 5.1 mmol/L    Chloride 103 95 - 110 mmol/L    CO2 18 (L) 23 - 29 mmol/L    Glucose  120 (H) 70 - 110 mg/dL    BUN 10 6 - 20 mg/dL    Creatinine 1.0 0.5 - 1.4 mg/dL    Calcium 9.5 8.7 - 10.5 mg/dL    Total Protein 7.5 6.0 - 8.4 g/dL    Albumin 3.6 3.5 - 5.2 g/dL    Total Bilirubin 0.8 0.1 - 1.0 mg/dL    Alkaline Phosphatase 54 (L) 55 - 135 U/L    AST 12 10 - 40 U/L    ALT 24 10 - 44 U/L    eGFR >60 >60 mL/min/1.73 m^2    Anion Gap 14 8 - 16 mmol/L   Lactic acid, plasma #1   Result Value Ref Range    Lactate (Lactic Acid) 1.1 0.5 - 2.2 mmol/L   Lactic acid, plasma #2   Result Value Ref Range    Lactate (Lactic Acid) 1.0 0.5 - 2.2 mmol/L   Urinalysis, Reflex to Urine Culture Urine, Clean Catch    Specimen: Urine   Result Value Ref Range    Specimen UA Urine, Clean Catch     Color, UA Colorless (A) Yellow, Straw, Slime    Appearance, UA Clear Clear    pH, UA 7.0 5.0 - 8.0    Specific Gravity, UA >1.030 (A) 1.005 - 1.030    Protein, UA Trace (A) Negative    Glucose, UA Negative Negative    Ketones, UA Negative Negative    Bilirubin (UA) Negative Negative    Occult Blood UA Negative Negative    Nitrite, UA Negative Negative    Urobilinogen, UA Negative <2.0 EU/dL    Leukocytes, UA Negative Negative   Procalcitonin   Result Value Ref Range    Procalcitonin 0.33 (H) <0.25 ng/mL   Lipase   Result Value Ref Range    Lipase 33 4 - 60 U/L   APTT   Result Value Ref Range    aPTT 34.0 (H) 21.0 - 32.0 sec   Protime-INR   Result Value Ref Range    Prothrombin Time 11.8 9.0 - 12.5 sec    INR 1.0 0.8 - 1.2   CBC Auto Differential   Result Value Ref Range    WBC 11.48 3.90 - 12.70 K/uL    RBC 4.51 (L) 4.60 - 6.20 M/uL    Hemoglobin 12.8 (L) 14.0 - 18.0 g/dL    Hematocrit 37.6 (L) 40.0 - 54.0 %    MCV 83 82 - 98 fL    MCH 28.4 27.0 - 31.0 pg    MCHC 34.0 32.0 - 36.0 g/dL    RDW 12.7 11.5 - 14.5 %    Platelets 168 150 - 450 K/uL    MPV 9.0 (L) 9.2 - 12.9 fL    Immature Granulocytes 0.5 0.0 - 0.5 %    Gran # (ANC) 7.7 1.8 - 7.7 K/uL    Immature Grans (Abs) 0.06 (H) 0.00 - 0.04 K/uL    Lymph # 2.4 1.0 - 4.8 K/uL    Mono  # 1.0 0.3 - 1.0 K/uL    Eos # 0.2 0.0 - 0.5 K/uL    Baso # 0.08 0.00 - 0.20 K/uL    nRBC 0 0 /100 WBC    Gran % 67.1 38.0 - 73.0 %    Lymph % 21.2 18.0 - 48.0 %    Mono % 8.5 4.0 - 15.0 %    Eosinophil % 2.0 0.0 - 8.0 %    Basophil % 0.7 0.0 - 1.9 %    Platelet Estimate Appears normal     Hypo Moderate     Differential Method Automated    Comprehensive metabolic panel   Result Value Ref Range    Sodium 136 136 - 145 mmol/L    Potassium 3.4 (L) 3.5 - 5.1 mmol/L    Chloride 101 95 - 110 mmol/L    CO2 24 23 - 29 mmol/L    Glucose 101 70 - 110 mg/dL    BUN 9 6 - 20 mg/dL    Creatinine 0.9 0.5 - 1.4 mg/dL    Calcium 9.0 8.7 - 10.5 mg/dL    Total Protein 6.1 6.0 - 8.4 g/dL    Albumin 3.0 (L) 3.5 - 5.2 g/dL    Total Bilirubin 1.0 0.1 - 1.0 mg/dL    Alkaline Phosphatase 51 (L) 55 - 135 U/L    AST 12 10 - 40 U/L    ALT 21 10 - 44 U/L    eGFR >60 >60 mL/min/1.73 m^2    Anion Gap 11 8 - 16 mmol/L   TSH   Result Value Ref Range    TSH 0.057 (L) 0.400 - 4.000 uIU/mL   T4, free   Result Value Ref Range    Free T4 1.31 0.71 - 1.51 ng/dL   Hemoglobin A1c if not done in past 3 months   Result Value Ref Range    Hemoglobin A1C 5.7 (H) 4.0 - 5.6 %    Estimated Avg Glucose 117 68 - 131 mg/dL   Comprehensive metabolic panel   Result Value Ref Range    Sodium 137 136 - 145 mmol/L    Potassium 3.9 3.5 - 5.1 mmol/L    Chloride 103 95 - 110 mmol/L    CO2 23 23 - 29 mmol/L    Glucose 118 (H) 70 - 110 mg/dL    BUN 9 6 - 20 mg/dL    Creatinine 0.8 0.5 - 1.4 mg/dL    Calcium 8.4 (L) 8.7 - 10.5 mg/dL    Total Protein 5.3 (L) 6.0 - 8.4 g/dL    Albumin 2.8 (L) 3.5 - 5.2 g/dL    Total Bilirubin 0.7 0.1 - 1.0 mg/dL    Alkaline Phosphatase 48 (L) 55 - 135 U/L    AST 37 10 - 40 U/L    ALT 36 10 - 44 U/L    eGFR >60 >60 mL/min/1.73 m^2    Anion Gap 11 8 - 16 mmol/L   CBC Auto Differential   Result Value Ref Range    WBC 10.76 3.90 - 12.70 K/uL    RBC 4.40 (L) 4.60 - 6.20 M/uL    Hemoglobin 12.3 (L) 14.0 - 18.0 g/dL    Hematocrit 37.1 (L) 40.0 - 54.0 %     MCV 84 82 - 98 fL    MCH 28.0 27.0 - 31.0 pg    MCHC 33.2 32.0 - 36.0 g/dL    RDW 12.5 11.5 - 14.5 %    Platelets 216 150 - 450 K/uL    MPV 9.2 9.2 - 12.9 fL    Immature Granulocytes 0.4 0.0 - 0.5 %    Gran # (ANC) 7.8 (H) 1.8 - 7.7 K/uL    Immature Grans (Abs) 0.04 0.00 - 0.04 K/uL    Lymph # 1.8 1.0 - 4.8 K/uL    Mono # 1.0 0.3 - 1.0 K/uL    Eos # 0.1 0.0 - 0.5 K/uL    Baso # 0.04 0.00 - 0.20 K/uL    nRBC 0 0 /100 WBC    Gran % 72.7 38.0 - 73.0 %    Lymph % 16.4 (L) 18.0 - 48.0 %    Mono % 9.2 4.0 - 15.0 %    Eosinophil % 0.9 0.0 - 8.0 %    Basophil % 0.4 0.0 - 1.9 %    Differential Method Automated    Magnesium   Result Value Ref Range    Magnesium 1.8 1.6 - 2.6 mg/dL   Specimen to Pathology, Surgery General Surgery   Result Value Ref Range    Final Pathologic Diagnosis       Gallbladder, cholecystectomy:   - Acute and chronic cholecystitis.    - Cholelithiasis.        Gross       Surgery ID:  [id number]   Pathology ID:  [id number]  1. Received in formalin labeled &quot;gallbladder with stones&quot; is an 8.9 x 3.5 x 1.9 cm disrupted gallbladder.  The serosa is red-brown.  The lumen contains minimal bile and a 5 x 3.5 x 1 cm aggregate of red-black calculi.  The mucosa is tan-pink   and roughened.  The wall thickness averages 0.3 cm.  The cystic duct margin is inked black.  Representative sections are submitted in cassette 1A.    TGV--1-A        Grossed by: Fausto Fernández MS, PA([facility])      Disclaimer       Unless the case is a 'gross only' or additional testing only, the final diagnosis for each specimen is based on a microscopic examination of appropriate tissue sections.   POCT glucose   Result Value Ref Range    POCT Glucose 99 70 - 110 mg/dL   POCT glucose   Result Value Ref Range    POCT Glucose 155 (H) 70 - 110 mg/dL   POCT glucose   Result Value Ref Range    POCT Glucose 150 (H) 70 - 110 mg/dL   POCT glucose   Result Value Ref Range    POCT Glucose 131 (H) 70 - 110 mg/dL         Imaging  Results:  Imaging Results              US Abdomen Limited (Gallbladder) (Final result)  Result time 08/16/24 01:50:40      Final result by Lang Duncan MD (08/16/24 01:50:40)                   Impression:      Sonographic findings concerning for acute cholecystitis      Electronically signed by: Lang Duncan  Date:    08/16/2024  Time:    01:50               Narrative:    EXAMINATION:  US ABDOMEN LIMITED    CLINICAL HISTORY:  Unspecified abdominal pain    TECHNIQUE:  Limited ultrasound of the right upper quadrant of the abdomen.    COMPARISON:  CT 08/15/2024    FINDINGS:  Cholelithiasis with wall thickening measuring up to 5 mm.  Sonographic Liao sign is reported as negative (please correlate for recent administration of pain medication)..  Common bile duct measures 8 mm.    Enlarged and fatty liver.  Liver measures 16.5 cm.    Right nephrolithiasis.                                       CT Abdomen Pelvis With IV Contrast NO Oral Contrast (Final result)  Result time 08/15/24 19:50:58      Final result by Elbert Hughes MD (08/15/24 19:50:58)                   Impression:      Numerous stones within the gallbladder consistent with gallstones.  Mild gallbladder wall thickening with slight fat stranding may relate to cholecystitis. Correlate clinically.    Remaining findings as above    All CT scans at this facility use dose modulation, iterative reconstruction, and/or weight based dosing when appropriate to reduce radiation dose to as low as reasonably achievable.      Electronically signed by: Elbert Hughes  Date:    08/15/2024  Time:    19:50               Narrative:    EXAMINATION:  CT ABDOMEN PELVIS WITH IV CONTRAST    CLINICAL HISTORY:  Abdominal abscess/infection suspected;LLQ abdominal pain;    TECHNIQUE:  Low dose axial images, sagittal and coronal reformations were obtained from the lung bases to the pubic symphysis following the IV administration of 100 mL of Omnipaque  350.    COMPARISON:  None    FINDINGS:  Numerous stones within the gallbladder consistent with gallstones.  Mild gallbladder wall thickening with slight fat stranding may relate to cholecystitis. Correlate clinically.    Fatty infiltration liver.  Subcentimeter hypodense lesion of the left lobe of the liver incompletely evaluated.  No evidence for stone within the common bile duct with a 7 mm maximum diameter.  Mild hepatomegaly.  No hydronephrosis.  Punctate nonobstructing bilateral renal calculi.  No evidence for appendicitis.  No bowel obstruction.  Status post posterior juan pablo fixation at L4-L5 S1.  Atherosclerotic changes of the aorta iliac vasculature.  No adrenal masses.  Spleen pancreas are grossly unremarkable.  Bladder is mildly distended.  Calcification along the anterior dorsal aspect of the penis may relate to Peyroniie's disease.  No vertebral body compression deformity                                       X-Ray Chest AP Portable (Final result)  Result time 08/15/24 18:53:19      Final result by Gurinder Sevilla MD (08/15/24 18:53:19)                   Impression:      1.  Negative for acute process involving the chest.    2.  Stable findings as noted above.      Electronically signed by: Gurinder Sevilla MD  Date:    08/15/2024  Time:    18:53               Narrative:    EXAMINATION:  XR CHEST AP PORTABLE    CLINICAL HISTORY:  Sepsis;    COMPARISON:  May 29, 2021    FINDINGS:  The study is lordotic in position.  The lungs are clear. The cardiac silhouette size is normal. The trachea is midline and the mediastinal width is normal. Negative for focal infiltrate, effusion or pneumothorax. Pulmonary vasculature is normal. Negative for osseous abnormalities. Marginal spondylosis.  Aortic arch calcifications.  Cardiophrenic fat pads.                                              The Emergency Provider reviewed the vital signs and test results, which are outlined above.     ED Discussion         2:03 AM: Discussed  pt's case with Dr. Chandra (General Surgery) who recommends to admit to medicine and that she will follow along. Give Zosyn, NPo, etc.    2:05 AM: Discussed case with Dr. Mcclain ([facility]). Dr. Mcclain agrees with current care and management of pt and accepts admission.   Admitting Service: [facility]  Admitting Physician: Dr. Mcclain  Admit to: Inpatient Med/Surg    2:05 AM: Re-evaluated pt. I have discussed test results, shared treatment plan, and the need for admission with patient and family at bedside. Pt and family express understanding at this time and agree with all information. All questions answered. Pt and family have no further questions or concerns at this time. Pt is ready for admit.      ED Course as of 08/26/24 1814   Thu Aug 15, 2024   2355 Leukocyte Esterase, UA: Negative [MC]   2355 NITRITE UA: Negative [MC]      ED Course User Index  [MC] Harinder Urias MD     Medical Decision Making  Differential diagnosis;  Gastroenteritis, Bowel obstruction, Colitis, Diverticulitis, Cholecystitis, Appendicitis, Perforated bowel, Herniation, Infectious etiology, UTI, Pyelonephritis,  Biliary obstruction, kidney stone     Patient presents to the emergency department due to abdominal pain.  Initially, he was complaining of pain in his left lower quadrant, I have had an initial concern for possibly complicated diverticulitis.  Patient's elevated white blood cell count of 16.  CT scan did demonstrate some concern for biliary pathology and negative for diverticulitis.  For this reason, ultrasound obtained of the right upper quadrant which demonstrates acute cholecystitis.  I discussed this case with General surgery, they agree and would like the patient admitted.  Patient is febrile, tachycardic, meets SIRS criteria.  Patient got IV antibiotics here in the emergency department.  Pain controlled here in the emergency department.  We will admit to Hospital Medicine for sepsis secondary to  cholecystitis.    Amount and/or Complexity of Data Reviewed  External Data Reviewed: notes.     Details: Reviewed an external note from 10/19/2023 with the patient's PCP documenting their chronic medical problems, outpatient treatment plans, and provider recommendations. This note affected my medical decision making and disposition planning today.     Labs: ordered. Decision-making details documented in ED Course.  Radiology: ordered. Decision-making details documented in ED Course.    Risk  Decision regarding hospitalization.                ED Medication(s):  Medications   morphine injection 4 mg (4 mg Intravenous Given 8/15/24 1812)   ondansetron injection 4 mg (4 mg Intravenous Given 8/15/24 1813)   iohexoL (OMNIPAQUE 350) injection 100 mL (100 mLs Intravenous Given 8/15/24 1930)   piperacillin-tazobactam (ZOSYN) 4.5 g in D5W 100 mL IVPB (MB+) (0 g Intravenous Stopped 8/16/24 0202)   lactated ringers bolus 1,000 mL (0 mLs Intravenous Stopped 8/16/24 0357)       Discharge Medication List as of 8/17/2024 10:43 AM        START taking these medications    Details   amoxicillin-clavulanate 875-125mg (AUGMENTIN) 875-125 mg per tablet Take 1 tablet by mouth every 12 (twelve) hours. for 3 days, Starting Sat 8/17/2024, Until Tue 8/20/2024, Normal      HYDROcodone-acetaminophen (NORCO) 5-325 mg per tablet Take 1 tablet by mouth every 8 (eight) hours as needed for Pain., Starting Sat 8/17/2024, Until Mon 8/19/2024 at 2359, Normal      ondansetron (ZOFRAN-ODT) 4 MG TbDL Take 1 tablet (4 mg total) by mouth 2 (two) times daily. for 2 days, Starting Sat 8/17/2024, Until Mon 8/19/2024, Normal              Follow-up Information       Delores Lazar MD. Schedule an appointment as soon as possible for a visit.    Specialty: Family Medicine  Contact information:  [address] [address] [address] [phone]               Jean Tapia MD Follow up.    Specialties: General Surgery, Bariatrics  Contact  information:  [address] [facility]  4th Floor  [facility] [address]  [phone]                                 Scribe Attestation:   Scribe #1: I performed the above scribed service and the documentation accurately describes the services I performed. I attest to the accuracy of the note.     Attending:   Physician Attestation Statement for Scribe #1: I, Harinder Urias MD, personally performed the services described in this documentation, as scribed by Ezequiel Toscano, in my presence, and it is both accurate and complete.           Clinical Impression       ICD-10-CM ICD-9-CM   1. Acute cholecystitis  K81.0 575.0   2. Sepsis  A41.9 038.9     995.91   3. Abdominal pain  R10.9 789.00   4. Chest pain  R07.9 786.50   5. Primary hypertension  I10 401.9       Disposition:   Disposition: Admitted  Condition: Harinder Chavis MD  08/26/24 5497

## 2024-08-16 NOTE — ASSESSMENT & PLAN NOTE
Chronic, controlled. Latest blood pressure and vitals reviewed-     Temp:  [100 °F (37.8 °C)]   Pulse:  []   Resp:  [16-20]   BP: (121-154)/(65-75)   SpO2:  [96 %-98 %] .   Home meds for hypertension were reviewed and noted below.   Hypertension Medications               amLODIPine (NORVASC) 5 MG tablet Take 1 tablet by mouth once daily    lisinopriL-hydrochlorothiazide (PRINZIDE,ZESTORETIC) 20-12.5 mg per tablet Take 1 tablet by mouth once daily.    propranoloL (INDERAL LA) 120 MG 24 hr capsule Take 1 capsule by mouth once daily            While in the hospital, will manage blood pressure as follows; Adjust home antihypertensive regimen as follows- currently NPO with Norvasc, lisinopril/hydrochlorothiazide, and propranolol held.  P.r.n. IV hydralazine with parameters. P.r.n. pain control.    Will utilize p.r.n. blood pressure medication only if patient's blood pressure greater than 160/100 and he develops symptoms such as worsening chest pain or shortness of breath.

## 2024-08-16 NOTE — ANESTHESIA POSTPROCEDURE EVALUATION
Anesthesia Post Evaluation    Patient: Wilman Neri    Procedure(s) Performed: Procedure(s) (LRB):  XI ROBOTIC CHOLECYSTECTOMY (N/A)    Final Anesthesia Type: general      Patient location during evaluation: PACU  Patient participation: Yes- Able to Participate  Level of consciousness: awake and alert and oriented  Post-procedure vital signs: reviewed and stable  Pain management: adequate  Airway patency: patent  STEPH mitigation strategies: Verification of full reversal of neuromuscular block  PONV status at discharge: No PONV  Anesthetic complications: no      Cardiovascular status: blood pressure returned to baseline and hemodynamically stable  Respiratory status: unassisted  Hydration status: euvolemic  Follow-up not needed.              Vitals Value Taken Time   /80 08/16/24 1614   Temp 36.3 °C (97.3 °F) 08/16/24 1540   Pulse 96 08/16/24 1619   Resp 20 08/16/24 1619   SpO2 96 % 08/16/24 1619   Vitals shown include unfiled device data.      No case tracking events are documented in the log.      Pain/Bindu Score: Pain Rating Prior to Med Admin: 5 (8/16/2024  4:10 PM)  Pain Rating Post Med Admin: 0 (8/16/2024  4:55 AM)  Bindu Score: 8 (8/16/2024  4:00 PM)

## 2024-08-16 NOTE — ASSESSMENT & PLAN NOTE
"-right nephrolithiasis seen on abdominal ultrasound  -urinalysis negative  -patient reports his current pain symptoms and presentation are not consistent with his usual "passing of a kidney stone"    "

## 2024-08-16 NOTE — TRANSFER OF CARE
"Anesthesia Transfer of Care Note    Patient: Wilman Neri    Procedure(s) Performed: Procedure(s) (LRB):  XI ROBOTIC CHOLECYSTECTOMY (N/A)    Patient location: PACU    Anesthesia Type: MAC    Transport from OR: Transported from OR on room air with adequate spontaneous ventilation    Post pain: adequate analgesia    Post assessment: no apparent anesthetic complications    Post vital signs: stable    Level of consciousness: awake and alert    Nausea/Vomiting: no nausea/vomiting    Complications: none    Transfer of care protocol was followed      Last vitals: Visit Vitals  /89   Pulse 93   Temp 36.8 °C (98.3 °F)   Resp 17   Ht 5' 10" (1.778 m)   Wt 98 kg (216 lb)   SpO2 97%   BMI 30.99 kg/m²     "

## 2024-08-16 NOTE — ASSESSMENT & PLAN NOTE
"Patient's FSGs are controlled on current medication regimen.  Last A1c reviewed-   Lab Results   Component Value Date    HGBA1C 6.4 (H) 03/07/2024    HGBA1C 6.4 (H) 03/07/2024     Most recent fingerstick glucose reviewed- No results for input(s): "POCTGLUCOSE" in the last 24 hours.  Current correctional scale  Low  Maintain anti-hyperglycemic dose as follows-   Antihyperglycemics (From admission, onward)      Start     Stop Route Frequency Ordered    08/16/24 0353  insulin aspart U-100 pen 0-5 Units         -- SubQ Every 6 hours PRN 08/16/24 0253          Hold Oral hypoglycemics while patient is in the hospital.  Hold Mounjaro.  "

## 2024-08-16 NOTE — OP NOTE
[facility] - Surgery ([facility])  Surgery Department  Operative Note    SUMMARY     Date of Procedure: 8/16/2024     Procedure: Procedure(s) (LRB):  XI ROBOTIC CHOLECYSTECTOMY (N/A)     Surgeons and Role:     * Jean Tapia MD - Primary    Assistant: LOLA España    Pre-Operative Diagnosis: Acute cholecystitis [K81.0]    Post-Operative Diagnosis: Post-Op Diagnosis Codes:     * Acute cholecystitis [K81.0]    Anesthesia: General    Operative Findings (including complications, if any):  Robotic cholecystectomy    Description of Technical Procedures:  Cholecystitis    Significant Surgical Tasks Conducted by the Assistant(s), if Applicable:  Robotic assist and closure    Estimated Blood Loss (EBL): 30cc           Implants: * No implants in log *    Specimens:   Specimen (24h ago, onward)       Start     Ordered    08/16/24 1520  Specimen to Pathology, Surgery General Surgery  Once        Comments: Pre-op Diagnosis: Acute cholecystitis [K81.0]Procedure(s):XI ROBOTIC CHOLECYSTECTOMY Number of specimens: 1Name of specimens: 1. Gallbladder with Stones - PERM     References:    Click here for ordering Quick Tip   Question Answer Comment   Procedure Type: General Surgery    Specimen Class: Routine/Screening    Release to patient Immediate        08/16/24 1519                            Condition: Good    Disposition: PACU - hemodynamically stable.    Procedure in detail:   The patient was brought to the OR and underwent general anesthesia. The abdomen was prepped and draped in usual sterile fashion.  An incision was made just above the umbilicus.  Fascia grasped and elevated.  A Veress needle was inserted and insufflation obtained to 15 mm Hg.  An 8 mm robotic Optiview port was placed at this site. The laparoscope was inserted. There was no evidence of a vascular or enteric injury.     Additional trocars were placed as follows under visualization. An 8 mm trocar was placed in the anterior axillary line of the right mid  abdomen. An 8 mm trocar was placed in the midclavicular line of the right midabdomen. An 8 mm robotic trocar was placed in the midclavicular line in the left midabdomen      The patient was placed in reverse Trendelenburg position and rolled to the left. The patient was docked to the da Rachel robot.       There were inflammatory changes with omentum adherent to the gallbladder and noted to have gallbladder changes consistent with cholecystitis      The fundus of the gallbladder was retracted cephalad. The gallbladder infundibulum was retracted laterally.      Through meticulous dissection the cystic duct was dissected circumferentially. The cystic artery was dissected circumferentially and the neck of the gallbladder was then dissected off the gallbladder bed. This cleared Calots triangle of all loose areolar tissue and the critical view was obtained.     Indocyanine green with firefly technology was used to elucidate the course of the cystic duct and biliary anatomy.      The cystic artery was clipped with 2 clips proximally 1 clip distally and transected.  The cystic duct was clipped twice proximally and once distally and divided.     The gallbladder was dissected free from the gallbladder bed.    The gallbladder bed was inspected and hemostasis was ensured using hook electrocautery. The area was irrigated until clear.     The umbilical operating arm of the robot was then removed and an Endo Catch bag was inserted. The gallbladder was placed in Endo Catch bag. The patient was then undocked from the da Rachel operating robot. The gallbladder was extracted.     The umbilical port site fascia was closed with 0 Vicryl sutures.     The trocars were removed under direct vision and no bleeding was noted. The abdomen was then desufflated. Marcaine was infiltrated. All incisions were closed with 4-0 Monocryl in a subcuticular manner. Dermaflex was applied to the incision sites

## 2024-08-16 NOTE — H&P
[facility] - Emergency Dept.  [facility]  History & Physical    Patient Name: Wilman Neri  MRN: [id number]  Patient Class: IP- Inpatient  Admission Date: 8/15/2024  Attending Physician:  Camelia Mcclain MD  Primary Care Provider: Delores Lazar MD         Patient information was obtained from patient, ER records, and ER physician .     Subjective:     Principal Problem:Acute cholecystitis    Chief Complaint:   Chief Complaint   Patient presents with    Abdominal Pain     Pt c/o LLQ since Tuesday, states that abd is distended. Denies n/v/d    Chills        HPI: 54-year-old white man with history of hypertension, hyperlipidemia, non-insulin-dependent diabetes mellitus type 2, hypothyroidism, depression, insomnia, hemispheric carotid artery syndrome, alcohol use, only occasional), and nephrolithiasis who presented to the emergency department with complaint of abdominal pain over the last 2-3 days, intermittent, 10/10 at its worst, currently 2/10 on the pain scale.  Abdominal pain was localized more so to the left lower quadrant but difficult to pinpoint per the patient.  He has noted some abdominal distention and had subjective fevers with chills.  He has had no associated nausea, vomiting, diarrhea, constipation, dysuria, or hematuria.  White blood cell count in our emergency department 16.01 with a T-max of a 100.0°.    No recent hospital admit.    Past Medical History:   Diagnosis Date    Anxiety     Depression     Diabetes mellitus 06/2017    BS doesn't check 06/02/2023    Hypertension     Hypothyroid     Sleep disorder     Squamous cell carcinoma of skin 10/2019    right shoulder    Stroke        Past Surgical History:   Procedure Laterality Date    BACK SURGERY      2 juan pablo 6 bolts lower back     COLONOSCOPY N/A 3/7/2019    Procedure: COLONOSCOPY;  Surgeon: Estephanie Koch MD;  Location: [facility];  Service: Endoscopy;  Laterality: N/A;    EXTERNAL EAR SURGERY      MODIFIED RADICAL MASTECTOMY W/ AXILLARY LYMPH  NODE DISSECTION         Review of patient's allergies indicates:  No Known Allergies    No current facility-administered medications on file prior to encounter.     Current Outpatient Medications on File Prior to Encounter   Medication Sig    amLODIPine (NORVASC) 5 MG tablet Take 1 tablet by mouth once daily    atorvastatin (LIPITOR) 20 MG tablet Take 1 tablet (20 mg total) by mouth once daily.    atorvastatin (LIPITOR) 20 MG tablet Take 1 tablet by mouth once daily    blood sugar diagnostic Strp To check BG 2 times daily, to use with insurance preferred meter    blood-glucose meter kit To check BG 2 times daily, to use with insurance preferred meter    butalbital-acetaminophen-caffeine -40 mg (FIORICET, ESGIC) -40 mg per tablet Take 1 tablet by mouth every 4 (four) hours as needed for Headaches.    co-enzyme Q-10 30 mg capsule Take 30 mg by mouth 3 (three) times daily.    eszopiclone (LUNESTA) 2 MG Tab Take 1 tablet (2 mg total) by mouth every evening.    eszopiclone (LUNESTA) 2 MG Tab TAKE 1 TABLET BY MOUTH ONCE DAILY IN THE EVENING    gabapentin (NEURONTIN) 100 MG capsule Take 1 capsule (100 mg total) by mouth 3 (three) times daily.    lancets Misc To check BG 2 times daily, to use with insurance preferred meter    levothyroxine (SYNTHROID) 200 MCG tablet Take 1 tablet (200 mcg total) by mouth once daily.    lisinopriL-hydrochlorothiazide (PRINZIDE,ZESTORETIC) 20-12.5 mg per tablet Take 1 tablet by mouth once daily.    MELATONIN ORAL Take by mouth. Patient states that he takes 5 mg daily    modafiniL (PROVIGIL) 200 MG Tab Take 1 tablet by mouth once daily    paroxetine (PAXIL) 20 MG tablet Take 1 tablet by mouth once daily    propranoloL (INDERAL LA) 120 MG 24 hr capsule Take 1 capsule by mouth once daily    sildenafil (VIAGRA) 50 MG tablet Take 1 tablet (50 mg total) by mouth daily as needed for Erectile Dysfunction.    tirzepatide (MOUNJARO) 2.5 mg/0.5 mL PnIj Inject 2.5 mg into the skin once a  week.     Family History       Problem Relation (Age of Onset)    Cancer Mother, Father    Cataracts Mother, Father    Diabetes Maternal Grandmother    Macular degeneration Father          Tobacco Use    Smoking status: Former     Current packs/day: 1.00     Types: Vaping w/o nicotine, Cigarettes    Smokeless tobacco: Never    Tobacco comments:     vape    Substance and Sexual Activity    Alcohol use: No     Comment: couple times a year    Drug use: No    Sexual activity: Yes     Partners: Female     Birth control/protection: None     Review of Systems   Constitutional:  Positive for chills and fever.   Gastrointestinal:  Positive for abdominal distention and abdominal pain. Negative for constipation, diarrhea, nausea and vomiting.   Genitourinary:  Negative for dysuria and hematuria.   All other systems reviewed and are negative.    Objective:     Vital Signs (Most Recent):  Temp: 100 °F (37.8 °C) (08/15/24 1625)  Pulse: 81 (08/16/24 0200)  Resp: 16 (08/15/24 1812)  BP: (!) 153/73 (08/16/24 0200)  SpO2: 96 % (08/16/24 0200) Vital Signs (24h Range):  Temp:  [100 °F (37.8 °C)] 100 °F (37.8 °C)  Pulse:  [] 81  Resp:  [16-20] 16  SpO2:  [96 %-98 %] 96 %  BP: (121-154)/(65-75) 153/73     Weight: 98 kg (216 lb)  Body mass index is 30.99 kg/m².     Physical Exam  Vitals reviewed.   Constitutional:       General: He is not in acute distress.     Appearance: He is not ill-appearing or diaphoretic.   HENT:      Nose: Nose normal.   Eyes:      Conjunctiva/sclera: Conjunctivae normal.   Cardiovascular:      Rate and Rhythm: Normal rate.   Pulmonary:      Effort: Pulmonary effort is normal. No respiratory distress.   Abdominal:      General: There is distension.      Tenderness: There is abdominal tenderness (mild). There is no rebound.   Musculoskeletal:         General: No swelling.   Skin:     General: Skin is warm and dry.   Neurological:      Mental Status: He is alert and oriented to person, place, and time.    Psychiatric:         Mood and Affect: Mood normal.         Behavior: Behavior normal.                Significant Labs: All pertinent labs within the past 24 hours have been reviewed.  Recent Lab Results  (Last 5 results in the past 24 hours)        08/16/24  0223   08/16/24  0013   08/15/24  2038   08/15/24  1808   08/15/24  1755        Procalcitonin         0.33  Comment: A concentration < 0.25 ng/mL represents a low risk of bacterial   infection.  Procalcitonin may not be accurate among patients with localized   infection, recent trauma or major surgery, immunosuppressed state,   invasive fungal infection, renal dysfunction. Decisions regarding   initiation or continuation of antibiotic therapy should not be based   solely on procalcitonin levels.         Albumin         3.6       ALP         54       ALT         24       Anion Gap         14       Appearance, UA     Clear           PTT 34.0  Comment: Refer to local heparin nomogram for intensity/dose specific   therapeutic   range.                 AST         12       Baso #         0.07       Basophil %         0.4       Bilirubin (UA)     Negative           BILIRUBIN TOTAL         0.8  Comment: For infants and newborns, interpretation of results should be based  on gestational age, weight and in agreement with clinical  observations.    Premature Infant recommended reference ranges:  Up to 24 hours.............<8.0 mg/dL  Up to 48 hours............<12.0 mg/dL  3-5 days..................<15.0 mg/dL  6-29 days.................<15.0 mg/dL         BUN         10       Calcium         9.5       Chloride         103       CO2         18       Color, UA     Colorless           Creatinine         1.0       Differential Method         Automated       eGFR         >60       Eos #         0.2       Eos %         1.1       Glucose         120       Glucose, UA     Negative           Gran # (ANC)         12.1       Gran %         75.7       Hematocrit         44.1        Hemoglobin         14.9       Hepatitis C Ab         Negative       HEP C Virus Hold Specimen         Hold for HCV sendout       HIV 1/2 Ag/Ab         Negative       Immature Grans (Abs)         0.08  Comment: Mild elevation in immature granulocytes is non specific and   can be seen in a variety of conditions including stress response,   acute inflammation, trauma and pregnancy. Correlation with other   laboratory and clinical findings is essential.         Immature Granulocytes         0.5       INR 1.0  Comment: Coumadin Therapy:  2.0 - 3.0 for INR for all indicators except mechanical heart valves  and antiphospholipid syndromes which should use 2.5 - 3.5.                 Ketones, UA     Negative           Lactic Acid Level   1.0  Comment: Falsely low lactic acid results can be found in samples   containing >=13.0 mg/dL total bilirubin and/or >=3.5 mg/dL   direct bilirubin.       1.1  Comment: Falsely low lactic acid results can be found in samples   containing >=13.0 mg/dL total bilirubin and/or >=3.5 mg/dL   direct bilirubin.           Leukocyte Esterase, UA     Negative           Lipase         33       Lymph #         2.4       Lymph %         15.2       MCH         28.4       MCHC         33.8       MCV         84       Mono #         1.1       Mono %         7.1       MPV         9.0       NITRITE UA     Negative           nRBC         0       Blood, UA     Negative           pH, UA     7.0           Platelet Count         215       Potassium         4.0       PROTEIN TOTAL         7.5       Protein, UA     Trace  Comment: Recommend a 24 hour urine protein or a urine   protein/creatinine ratio if globulin induced proteinuria is  clinically suspected.             PT 11.8               RBC         5.24       RDW         13.1       Sodium         135       Spec Grav UA     >1.030           Specimen UA     Urine, Clean Catch           UROBILINOGEN UA     Negative           WBC         16.01                    "           Significant Imaging: I have reviewed all pertinent imaging results/findings within the past 24 hours.  US Abdomen Limited (Gallbladder)   Final Result      Sonographic findings concerning for acute cholecystitis         Electronically signed by: Lang Duncan   Date:    08/16/2024   Time:    01:50      CT Abdomen Pelvis With IV Contrast NO Oral Contrast   Final Result      Numerous stones within the gallbladder consistent with gallstones.  Mild gallbladder wall thickening with slight fat stranding may relate to cholecystitis. Correlate clinically.      Remaining findings as above      All CT scans at this facility use dose modulation, iterative reconstruction, and/or weight based dosing when appropriate to reduce radiation dose to as low as reasonably achievable.         Electronically signed by: Elbert Hughes   Date:    08/15/2024   Time:    19:50      X-Ray Chest AP Portable   Final Result      1.  Negative for acute process involving the chest.      2.  Stable findings as noted above.         Electronically signed by: Gurinder Sevilla MD   Date:    08/15/2024   Time:    18:53         Assessment/Plan:     * Acute cholecystitis  Sepsis with acute cholecystitis  -white blood cell count 16.01, lactic acid level 1.1 and repeat 1.0, procalcitonin level 0.33, LFTs unremarkable, lipase 33, T-max a 100.0°, tachycardic on arrival  -CT scan of abdomen and pelvis with numerous stones within the gallbladder, mild gallbladder wall thickening with slight fat stranding may be secondary to cholecystitis  -abdominal ultrasound with Cholelithiasis with wall thickening measuring up to 5 mm.  Sonographic Liao sign is reported as negative.  Common bile duct measures 8 mm. Enlarged and fatty liver.  Liver measures 16.5 cm. Right nephrolithiasis."  - NPO, IV fluids, IV Zosyn, p.r.n. pain/nausea control  -general surgery consult        Sepsis  This patient does have evidence of infective focus  My overall impression is " "sepsis.  Source: Abdominal  Antibiotics given-   Antibiotics (72h ago, onward)      Start     Stop Route Frequency Ordered    08/16/24 0900  piperacillin-tazobactam (ZOSYN) 4.5 g in D5W 100 mL IVPB (MB+)         -- IV Every 8 hours (non-standard times) 08/16/24 0253          Latest lactate reviewed-  Recent Labs   Lab 08/16/24  0013   LACTATE 1.0     Organ dysfunction not present    Fluid challenge Not needed - patient is not hypotensive      Post- resuscitation assessment No - Post resuscitation assessment not needed       Will Not start Pressors- Levophed for MAP of 65  Source control achieved by:  IV fluids, IV antibiotics, and surgical consultation    Right nephrolithiasis  -right nephrolithiasis seen on abdominal ultrasound  -urinalysis negative  -patient reports his current pain symptoms and presentation are not consistent with his usual "passing of a kidney stone"      Diabetes mellitus type 2, noninsulin dependent  Patient's FSGs are controlled on current medication regimen.  Last A1c reviewed-   Lab Results   Component Value Date    HGBA1C 6.4 (H) 03/07/2024    HGBA1C 6.4 (H) 03/07/2024     Most recent fingerstick glucose reviewed- No results for input(s): "POCTGLUCOSE" in the last 24 hours.  Current correctional scale  Low  Maintain anti-hyperglycemic dose as follows-   Antihyperglycemics (From admission, onward)      Start     Stop Route Frequency Ordered    08/16/24 0353  insulin aspart U-100 pen 0-5 Units         -- SubQ Every 6 hours PRN 08/16/24 0253          Hold Oral hypoglycemics while patient is in the hospital.  Hold Mounjaro.    Hyponatremia  Hyponatremia is likely due to Dehydration/hypovolemia. The patient's most recent sodium results are listed below.  Recent Labs     08/15/24  1755   *     Plan  - Correct the sodium by 4-6mEq in 24 hours.   - Obtain the following studies: TSH, T4.  - Will treat the hyponatremia with IV fluids as follows:  Normal saline at 100 mL/hr  - Monitor sodium " Daily.   - Patient hyponatremia is stable      Fatty liver  -LFTs unremarkable  -outpatient evaluation      Hyperlipidemia  Hold Lipitor while NPO      Depression  -Paxil held while NPO        Hypothyroidism  -hold Synthroid while NPO  -recent TSH 0.224 (low)  -check TSH and free T4      HTN (hypertension)  Chronic, controlled. Latest blood pressure and vitals reviewed-     Temp:  [100 °F (37.8 °C)]   Pulse:  []   Resp:  [16-20]   BP: (121-154)/(65-75)   SpO2:  [96 %-98 %] .   Home meds for hypertension were reviewed and noted below.   Hypertension Medications               amLODIPine (NORVASC) 5 MG tablet Take 1 tablet by mouth once daily    lisinopriL-hydrochlorothiazide (PRINZIDE,ZESTORETIC) 20-12.5 mg per tablet Take 1 tablet by mouth once daily.    propranoloL (INDERAL LA) 120 MG 24 hr capsule Take 1 capsule by mouth once daily            While in the hospital, will manage blood pressure as follows; Adjust home antihypertensive regimen as follows- currently NPO with Norvasc, lisinopril/hydrochlorothiazide, and propranolol held.  P.r.n. IV hydralazine with parameters. P.r.n. pain control.    Will utilize p.r.n. blood pressure medication only if patient's blood pressure greater than 160/100 and he develops symptoms such as worsening chest pain or shortness of breath.      VTE Risk Mitigation (From admission, onward)           Ordered     IP VTE LOW RISK PATIENT  Once         08/16/24 0253     Place sequential compression device  Until discontinued         08/16/24 0253                                    Camelia Mcclain MD  Department of Hospital Medicine  [facility] - Emergency Dept.

## 2024-08-16 NOTE — ASSESSMENT & PLAN NOTE
Hyponatremia is likely due to Dehydration/hypovolemia. The patient's most recent sodium results are listed below.  Recent Labs     08/15/24  1755   *     Plan  - Correct the sodium by 4-6mEq in 24 hours.   - Obtain the following studies: TSH, T4.  - Will treat the hyponatremia with IV fluids as follows:  Normal saline at 100 mL/hr  - Monitor sodium Daily.   - Patient hyponatremia is stable

## 2024-08-16 NOTE — ASSESSMENT & PLAN NOTE
This patient does have evidence of infective focus  My overall impression is sepsis.  Source: Abdominal  Antibiotics given-   Antibiotics (72h ago, onward)      Start     Stop Route Frequency Ordered    08/16/24 0900  piperacillin-tazobactam (ZOSYN) 4.5 g in D5W 100 mL IVPB (MB+)         -- IV Every 8 hours (non-standard times) 08/16/24 0253          Latest lactate reviewed-  Recent Labs   Lab 08/16/24  0013   LACTATE 1.0     Organ dysfunction not present    Fluid challenge Not needed - patient is not hypotensive      Post- resuscitation assessment No - Post resuscitation assessment not needed       Will Not start Pressors- Levophed for MAP of 65  Source control achieved by:  IV fluids, IV antibiotics, and surgical consultation

## 2024-08-16 NOTE — ANESTHESIA PROCEDURE NOTES
Intubation    Date/Time: 8/16/2024 1:51 PM    Performed by: Vega Culver CRNA  Authorized by: Williams Dey MD    Intubation:     Induction:  Rapid sequence induction    Intubated:  Postinduction    Mask Ventilation:  Not attempted    Attempts:  1    Attempted By:  CRNA    Method of Intubation:  Direct    Blade:  Jessenia 3    Laryngeal View Grade: Grade I - full view of cords      Difficult Airway Encountered?: No      Complications:  None    Airway Device:  Oral endotracheal tube    Airway Device Size:  7.5    Style/Cuff Inflation:  Cuffed (inflated to minimal occlusive pressure)    Tube secured:  22    Secured at:  The lips    Placement Verified By:  Capnometry    Complicating Factors:  None    Findings Post-Intubation:  BS equal bilateral and atraumatic/condition of teeth unchanged

## 2024-08-16 NOTE — ASSESSMENT & PLAN NOTE
"Sepsis with acute cholecystitis  -white blood cell count 16.01, lactic acid level 1.1 and repeat 1.0, procalcitonin level 0.33, LFTs unremarkable, lipase 33, T-max a 100.0°, tachycardic on arrival  -CT scan of abdomen and pelvis with numerous stones within the gallbladder, mild gallbladder wall thickening with slight fat stranding may be secondary to cholecystitis  -abdominal ultrasound with Cholelithiasis with wall thickening measuring up to 5 mm.  Sonographic Liao sign is reported as negative.  Common bile duct measures 8 mm. Enlarged and fatty liver.  Liver measures 16.5 cm. Right nephrolithiasis."  - NPO, IV fluids, IV Zosyn, p.r.n. pain/nausea control  -general surgery consult      "

## 2024-08-16 NOTE — PLAN OF CARE
Pt returned back to floor from surgery  Pt resting in bed .   Pt awake and alert . Hob elevated .   Vitals stable .   POC discussed .   X4 Lap sites with dermabond to abd  IV intact   Safety Measures in place   Chart check complete .   Will  continue to monitor .

## 2024-08-16 NOTE — SUBJECTIVE & OBJECTIVE
Past Medical History:   Diagnosis Date    Anxiety     Depression     Diabetes mellitus 06/2017    BS doesn't check 06/02/2023    Hypertension     Hypothyroid     Sleep disorder     Squamous cell carcinoma of skin 10/2019    right shoulder    Stroke        Past Surgical History:   Procedure Laterality Date    BACK SURGERY      2 juan pablo 6 bolts lower back     COLONOSCOPY N/A 3/7/2019    Procedure: COLONOSCOPY;  Surgeon: Estephanie Koch MD;  Location: [facility];  Service: Endoscopy;  Laterality: N/A;    EXTERNAL EAR SURGERY      MODIFIED RADICAL MASTECTOMY W/ AXILLARY LYMPH NODE DISSECTION         Review of patient's allergies indicates:  No Known Allergies    No current facility-administered medications on file prior to encounter.     Current Outpatient Medications on File Prior to Encounter   Medication Sig    amLODIPine (NORVASC) 5 MG tablet Take 1 tablet by mouth once daily    atorvastatin (LIPITOR) 20 MG tablet Take 1 tablet (20 mg total) by mouth once daily.    atorvastatin (LIPITOR) 20 MG tablet Take 1 tablet by mouth once daily    blood sugar diagnostic Strp To check BG 2 times daily, to use with insurance preferred meter    blood-glucose meter kit To check BG 2 times daily, to use with insurance preferred meter    butalbital-acetaminophen-caffeine -40 mg (FIORICET, ESGIC) -40 mg per tablet Take 1 tablet by mouth every 4 (four) hours as needed for Headaches.    co-enzyme Q-10 30 mg capsule Take 30 mg by mouth 3 (three) times daily.    eszopiclone (LUNESTA) 2 MG Tab Take 1 tablet (2 mg total) by mouth every evening.    eszopiclone (LUNESTA) 2 MG Tab TAKE 1 TABLET BY MOUTH ONCE DAILY IN THE EVENING    gabapentin (NEURONTIN) 100 MG capsule Take 1 capsule (100 mg total) by mouth 3 (three) times daily.    lancets Misc To check BG 2 times daily, to use with insurance preferred meter    levothyroxine (SYNTHROID) 200 MCG tablet Take 1 tablet (200 mcg total) by mouth once daily.     lisinopriL-hydrochlorothiazide (PRINZIDE,ZESTORETIC) 20-12.5 mg per tablet Take 1 tablet by mouth once daily.    MELATONIN ORAL Take by mouth. Patient states that he takes 5 mg daily    modafiniL (PROVIGIL) 200 MG Tab Take 1 tablet by mouth once daily    paroxetine (PAXIL) 20 MG tablet Take 1 tablet by mouth once daily    propranoloL (INDERAL LA) 120 MG 24 hr capsule Take 1 capsule by mouth once daily    sildenafil (VIAGRA) 50 MG tablet Take 1 tablet (50 mg total) by mouth daily as needed for Erectile Dysfunction.    tirzepatide (MOUNJARO) 2.5 mg/0.5 mL PnIj Inject 2.5 mg into the skin once a week.     Family History       Problem Relation (Age of Onset)    Cancer Mother, Father    Cataracts Mother, Father    Diabetes Maternal Grandmother    Macular degeneration Father          Tobacco Use    Smoking status: Former     Current packs/day: 1.00     Types: Vaping w/o nicotine, Cigarettes    Smokeless tobacco: Never    Tobacco comments:     vape    Substance and Sexual Activity    Alcohol use: No     Comment: couple times a year    Drug use: No    Sexual activity: Yes     Partners: Female     Birth control/protection: None     Review of Systems   Constitutional:  Positive for chills and fever.   Gastrointestinal:  Positive for abdominal distention and abdominal pain. Negative for constipation, diarrhea, nausea and vomiting.   Genitourinary:  Negative for dysuria and hematuria.   All other systems reviewed and are negative.    Objective:     Vital Signs (Most Recent):  Temp: 100 °F (37.8 °C) (08/15/24 1625)  Pulse: 81 (08/16/24 0200)  Resp: 16 (08/15/24 1812)  BP: (!) 153/73 (08/16/24 0200)  SpO2: 96 % (08/16/24 0200) Vital Signs (24h Range):  Temp:  [100 °F (37.8 °C)] 100 °F (37.8 °C)  Pulse:  [] 81  Resp:  [16-20] 16  SpO2:  [96 %-98 %] 96 %  BP: (121-154)/(65-75) 153/73     Weight: 98 kg (216 lb)  Body mass index is 30.99 kg/m².     Physical Exam  Vitals reviewed.   Constitutional:       General: He is not in  acute distress.     Appearance: He is not ill-appearing or diaphoretic.   HENT:      Nose: Nose normal.   Eyes:      Conjunctiva/sclera: Conjunctivae normal.   Cardiovascular:      Rate and Rhythm: Normal rate.   Pulmonary:      Effort: Pulmonary effort is normal. No respiratory distress.   Abdominal:      General: There is distension.      Tenderness: There is abdominal tenderness (mild). There is no rebound.   Musculoskeletal:         General: No swelling.   Skin:     General: Skin is warm and dry.   Neurological:      Mental Status: He is alert and oriented to person, place, and time.   Psychiatric:         Mood and Affect: Mood normal.         Behavior: Behavior normal.                Significant Labs: All pertinent labs within the past 24 hours have been reviewed.  Recent Lab Results  (Last 5 results in the past 24 hours)        08/16/24  0223   08/16/24  0013   08/15/24  2038   08/15/24  1808   08/15/24  1755        Procalcitonin         0.33  Comment: A concentration < 0.25 ng/mL represents a low risk of bacterial   infection.  Procalcitonin may not be accurate among patients with localized   infection, recent trauma or major surgery, immunosuppressed state,   invasive fungal infection, renal dysfunction. Decisions regarding   initiation or continuation of antibiotic therapy should not be based   solely on procalcitonin levels.         Albumin         3.6       ALP         54       ALT         24       Anion Gap         14       Appearance, UA     Clear           PTT 34.0  Comment: Refer to local heparin nomogram for intensity/dose specific   therapeutic   range.                 AST         12       Baso #         0.07       Basophil %         0.4       Bilirubin (UA)     Negative           BILIRUBIN TOTAL         0.8  Comment: For infants and newborns, interpretation of results should be based  on gestational age, weight and in agreement with clinical  observations.    Premature Infant recommended reference  ranges:  Up to 24 hours.............<8.0 mg/dL  Up to 48 hours............<12.0 mg/dL  3-5 days..................<15.0 mg/dL  6-29 days.................<15.0 mg/dL         BUN         10       Calcium         9.5       Chloride         103       CO2         18       Color, UA     Colorless           Creatinine         1.0       Differential Method         Automated       eGFR         >60       Eos #         0.2       Eos %         1.1       Glucose         120       Glucose, UA     Negative           Gran # (ANC)         12.1       Gran %         75.7       Hematocrit         44.1       Hemoglobin         14.9       Hepatitis C Ab         Negative       HEP C Virus Hold Specimen         Hold for HCV sendout       HIV 1/2 Ag/Ab         Negative       Immature Grans (Abs)         0.08  Comment: Mild elevation in immature granulocytes is non specific and   can be seen in a variety of conditions including stress response,   acute inflammation, trauma and pregnancy. Correlation with other   laboratory and clinical findings is essential.         Immature Granulocytes         0.5       INR 1.0  Comment: Coumadin Therapy:  2.0 - 3.0 for INR for all indicators except mechanical heart valves  and antiphospholipid syndromes which should use 2.5 - 3.5.                 Ketones, UA     Negative           Lactic Acid Level   1.0  Comment: Falsely low lactic acid results can be found in samples   containing >=13.0 mg/dL total bilirubin and/or >=3.5 mg/dL   direct bilirubin.       1.1  Comment: Falsely low lactic acid results can be found in samples   containing >=13.0 mg/dL total bilirubin and/or >=3.5 mg/dL   direct bilirubin.           Leukocyte Esterase, UA     Negative           Lipase         33       Lymph #         2.4       Lymph %         15.2       MCH         28.4       MCHC         33.8       MCV         84       Mono #         1.1       Mono %         7.1       MPV         9.0       NITRITE UA     Negative            nRBC         0       Blood, UA     Negative           pH, UA     7.0           Platelet Count         215       Potassium         4.0       PROTEIN TOTAL         7.5       Protein, UA     Trace  Comment: Recommend a 24 hour urine protein or a urine   protein/creatinine ratio if globulin induced proteinuria is  clinically suspected.             PT 11.8               RBC         5.24       RDW         13.1       Sodium         135       Spec Grav UA     >1.030           Specimen UA     Urine, Clean Catch           UROBILINOGEN UA     Negative           WBC         16.01                              Significant Imaging: I have reviewed all pertinent imaging results/findings within the past 24 hours.  US Abdomen Limited (Gallbladder)   Final Result      Sonographic findings concerning for acute cholecystitis         Electronically signed by: Lang Duncan   Date:    08/16/2024   Time:    01:50      CT Abdomen Pelvis With IV Contrast NO Oral Contrast   Final Result      Numerous stones within the gallbladder consistent with gallstones.  Mild gallbladder wall thickening with slight fat stranding may relate to cholecystitis. Correlate clinically.      Remaining findings as above      All CT scans at this facility use dose modulation, iterative reconstruction, and/or weight based dosing when appropriate to reduce radiation dose to as low as reasonably achievable.         Electronically signed by: Elbert Hughes   Date:    08/15/2024   Time:    19:50      X-Ray Chest AP Portable   Final Result      1.  Negative for acute process involving the chest.      2.  Stable findings as noted above.         Electronically signed by: Gurinder Sevilla MD   Date:    08/15/2024   Time:    18:53

## 2024-08-16 NOTE — ANESTHESIA PREPROCEDURE EVALUATION
08/16/2024  Wilman Neri is a 54 y.o., male    Patient Active Problem List   Diagnosis    HTN (hypertension)    Hypothyroidism    Depression    Circadian rhythm sleep disorder, shift work type    Insomnia    Hemispheric carotid artery syndrome    Tendonitis of upper biceps tendon of left shoulder    Acromioclavicular joint arthritis    Colon cancer screening    Drinks beer    Uncontrolled type 2 diabetes mellitus with hyperglycemia    Acute cholecystitis    Sepsis    Right nephrolithiasis    Diabetes mellitus type 2, noninsulin dependent    Hyperlipidemia    Fatty liver    Hyponatremia     Past Medical History:   Diagnosis Date    Anxiety     Depression     Diabetes mellitus 06/2017    BS doesn't check 06/02/2023    Hypertension     Hypothyroid     Sleep disorder     Squamous cell carcinoma of skin 10/2019    right shoulder    Stroke      Past Surgical History:   Procedure Laterality Date    BACK SURGERY      2 juan pablo 6 bolts lower back     COLONOSCOPY N/A 3/7/2019    Procedure: COLONOSCOPY;  Surgeon: Estephanie Koch MD;  Location: [facility];  Service: Endoscopy;  Laterality: N/A;    EXTERNAL EAR SURGERY      MODIFIED RADICAL MASTECTOMY W/ AXILLARY LYMPH NODE DISSECTION         Chemistry        Component Value Date/Time     08/16/2024 0411    K 3.4 (L) 08/16/2024 0411     08/16/2024 0411    CO2 24 08/16/2024 0411    BUN 9 08/16/2024 0411    CREATININE 0.9 08/16/2024 0411     08/16/2024 0411        Component Value Date/Time    CALCIUM 9.0 08/16/2024 0411    ALKPHOS 51 (L) 08/16/2024 0411    AST 12 08/16/2024 0411    ALT 21 08/16/2024 0411    BILITOT 1.0 08/16/2024 0411    ESTGFRAFRICA >60.0 07/14/2022 0823    EGFRNONAA >60.0 07/14/2022 0823        Lab Results   Component Value Date    WBC 11.48 08/16/2024    HGB 12.8 (L) 08/16/2024    HCT 37.6 (L) 08/16/2024    MCV 83 08/16/2024      08/16/2024             Pre-op Assessment    I have reviewed the Patient Summary Reports.    I have reviewed the NPO Status.   I have reviewed the Medications.     Review of Systems  Anesthesia Hx:  No problems with previous Anesthesia   History of prior surgery of interest to airway management or planning:  Previous anesthesia: General, MAC          Denies Personal Hx of Anesthesia complications.                    Social:  Non-Smoker       Hematology/Oncology:  Hematology Normal   Oncology Normal                                   Cardiovascular:     Hypertension              ECG has been reviewed.                          Pulmonary:  Pulmonary Normal                       Renal/:   renal calculi               Hepatic/GI:      Liver Disease,  Fatty Liver           Musculoskeletal:  Musculoskeletal Normal                Neurological:  TIA, CVA, no residual symptoms                                    Endocrine:  Diabetes, poorly controlled, type 2 Hypothyroidism  BMI 31    *Takes Mounjaro - last dose 8/9/24    Levothyroxine daily         Psych:  Psychiatric History                  Physical Exam  General: Well nourished, Cooperative, Alert and Oriented    Airway:  Mallampati: II   Mouth Opening: Normal  TM Distance: Normal  Tongue: Normal  Neck ROM: Normal ROM    Dental:  Dentures, Edentulous        Anesthesia Plan  Type of Anesthesia, risks & benefits discussed:    Anesthesia Type: Gen ETT  Intra-op Monitoring Plan: Standard ASA Monitors  Post Op Pain Control Plan: multimodal analgesia and IV/PO Opioids PRN  Induction:  IV  Airway Plan: Direct, Post-Induction  Informed Consent: Informed consent signed with the Patient and all parties understand the risks and agree with anesthesia plan.  All questions answered.   ASA Score: 3  Day of Surgery Review of History & Physical: H&P Update referred to the surgeon/provider.  Anesthesia Plan Notes: **Takes Modafenil (last dose 8/15/24) -- use Ephedrine and other indirect  pressors with caution as they may be ineffective     Ready For Surgery From Anesthesia Perspective.     .

## 2024-08-16 NOTE — HPI
Wilman Neri is a 54 y.o. male who presented to the emergency department with a several day history of abdominal pain.  He states the pain started on Tuesday night and has been increasing in severity.  He has no history of similar symptoms.  He did try some laxatives in the like but without any relief.  He does admit to chills however no nausea or vomiting.  Upon presentation to the emergency department his temperature was a 100° F and he was mildly tachycardic at 1:14 a.m..  His blood pressure was okay.  Workup in the ER revealed a leukocytosis of 16 K and labs were otherwise normal including his LFTs.  CT scan was performed to rule out diverticulitis and showed numerous stones within the gallbladder consistent with gallstones. Mild gallbladder wall thickening with slight fat stranding may relate to cholecystitis. Subsequent RUQ US showed stones and wall thickening concerning for cholecystitis.  He was admitted to hospital medicine.

## 2024-08-16 NOTE — ASSESSMENT & PLAN NOTE
Imaging and history consistent with acute cholecystitis.  We discussed the differential diagnosis as well as the workup and management of acute cholecystitis.  He is still having right upper quadrant pain.  Fortunately his leukocytosis has improved with the antibiotics and hydration.  He is interested in pursuing surgical intervention.  I discussed with him that my partner Dr. Jean Tapia is on-call today and that one of the two of us will plan to take his gallbladder out later on today.    -- NPO  -- abx  -- plan for robotic cholecystectomy today.  Consent to be obtained by operating surgeon  -- remainder of care as per primary team

## 2024-08-16 NOTE — HPI
54-year-old white man with history of hypertension, hyperlipidemia, non-insulin-dependent diabetes mellitus type 2, hypothyroidism, depression, insomnia, hemispheric carotid artery syndrome, alcohol use, only occasional), and nephrolithiasis who presented to the emergency department with complaint of abdominal pain over the last 2-3 days, intermittent, 10/10 at its worst, currently 2/10 on the pain scale.  Abdominal pain was localized more so to the left lower quadrant but difficult to pinpoint per the patient.  He has noted some abdominal distention and had subjective fevers with chills.  He has had no associated nausea, vomiting, diarrhea, constipation, dysuria, or hematuria.  White blood cell count in our emergency department 16.01 with a T-max of a 100.0°.    No recent hospital admit.

## 2024-08-17 VITALS
DIASTOLIC BLOOD PRESSURE: 75 MMHG | OXYGEN SATURATION: 92 % | RESPIRATION RATE: 18 BRPM | HEIGHT: 70 IN | WEIGHT: 216 LBS | BODY MASS INDEX: 30.92 KG/M2 | TEMPERATURE: 99 F | SYSTOLIC BLOOD PRESSURE: 132 MMHG | HEART RATE: 108 BPM

## 2024-08-17 LAB
ALBUMIN SERPL BCP-MCNC: 2.8 G/DL (ref 3.5–5.2)
ALP SERPL-CCNC: 48 U/L (ref 55–135)
ALT SERPL W/O P-5'-P-CCNC: 36 U/L (ref 10–44)
ANION GAP SERPL CALC-SCNC: 11 MMOL/L (ref 8–16)
AST SERPL-CCNC: 37 U/L (ref 10–40)
BASOPHILS # BLD AUTO: 0.04 K/UL (ref 0–0.2)
BASOPHILS NFR BLD: 0.4 % (ref 0–1.9)
BILIRUB SERPL-MCNC: 0.7 MG/DL (ref 0.1–1)
BUN SERPL-MCNC: 9 MG/DL (ref 6–20)
CALCIUM SERPL-MCNC: 8.4 MG/DL (ref 8.7–10.5)
CHLORIDE SERPL-SCNC: 103 MMOL/L (ref 95–110)
CO2 SERPL-SCNC: 23 MMOL/L (ref 23–29)
CREAT SERPL-MCNC: 0.8 MG/DL (ref 0.5–1.4)
DIFFERENTIAL METHOD BLD: ABNORMAL
EOSINOPHIL # BLD AUTO: 0.1 K/UL (ref 0–0.5)
EOSINOPHIL NFR BLD: 0.9 % (ref 0–8)
ERYTHROCYTE [DISTWIDTH] IN BLOOD BY AUTOMATED COUNT: 12.5 % (ref 11.5–14.5)
EST. GFR  (NO RACE VARIABLE): >60 ML/MIN/1.73 M^2
GLUCOSE SERPL-MCNC: 118 MG/DL (ref 70–110)
HCT VFR BLD AUTO: 37.1 % (ref 40–54)
HGB BLD-MCNC: 12.3 G/DL (ref 14–18)
IMM GRANULOCYTES # BLD AUTO: 0.04 K/UL (ref 0–0.04)
IMM GRANULOCYTES NFR BLD AUTO: 0.4 % (ref 0–0.5)
LYMPHOCYTES # BLD AUTO: 1.8 K/UL (ref 1–4.8)
LYMPHOCYTES NFR BLD: 16.4 % (ref 18–48)
MAGNESIUM SERPL-MCNC: 1.8 MG/DL (ref 1.6–2.6)
MCH RBC QN AUTO: 28 PG (ref 27–31)
MCHC RBC AUTO-ENTMCNC: 33.2 G/DL (ref 32–36)
MCV RBC AUTO: 84 FL (ref 82–98)
MONOCYTES # BLD AUTO: 1 K/UL (ref 0.3–1)
MONOCYTES NFR BLD: 9.2 % (ref 4–15)
NEUTROPHILS # BLD AUTO: 7.8 K/UL (ref 1.8–7.7)
NEUTROPHILS NFR BLD: 72.7 % (ref 38–73)
NRBC BLD-RTO: 0 /100 WBC
PLATELET # BLD AUTO: 216 K/UL (ref 150–450)
PMV BLD AUTO: 9.2 FL (ref 9.2–12.9)
POCT GLUCOSE: 131 MG/DL (ref 70–110)
POTASSIUM SERPL-SCNC: 3.9 MMOL/L (ref 3.5–5.1)
PROT SERPL-MCNC: 5.3 G/DL (ref 6–8.4)
RBC # BLD AUTO: 4.4 M/UL (ref 4.6–6.2)
SODIUM SERPL-SCNC: 137 MMOL/L (ref 136–145)
WBC # BLD AUTO: 10.76 K/UL (ref 3.9–12.7)

## 2024-08-17 PROCEDURE — 63600175 PHARM REV CODE 636 W HCPCS: Performed by: SURGERY

## 2024-08-17 PROCEDURE — 36415 COLL VENOUS BLD VENIPUNCTURE: CPT | Performed by: HOSPITALIST

## 2024-08-17 PROCEDURE — 25000003 PHARM REV CODE 250: Performed by: SURGERY

## 2024-08-17 PROCEDURE — 25000003 PHARM REV CODE 250: Performed by: HOSPITALIST

## 2024-08-17 PROCEDURE — 83735 ASSAY OF MAGNESIUM: CPT | Performed by: HOSPITALIST

## 2024-08-17 PROCEDURE — 80053 COMPREHEN METABOLIC PANEL: CPT | Performed by: HOSPITALIST

## 2024-08-17 PROCEDURE — 85025 COMPLETE CBC W/AUTO DIFF WBC: CPT | Performed by: HOSPITALIST

## 2024-08-17 RX ORDER — AMOXICILLIN AND CLAVULANATE POTASSIUM 875; 125 MG/1; MG/1
1 TABLET, FILM COATED ORAL EVERY 12 HOURS
Qty: 6 TABLET | Refills: 0 | Status: SHIPPED | OUTPATIENT
Start: 2024-08-17 | End: 2024-08-20

## 2024-08-17 RX ORDER — ONDANSETRON 4 MG/1
4 TABLET, ORALLY DISINTEGRATING ORAL 2 TIMES DAILY
Qty: 4 TABLET | Refills: 0 | Status: SHIPPED | OUTPATIENT
Start: 2024-08-17 | End: 2024-08-19

## 2024-08-17 RX ORDER — HYDROCODONE BITARTRATE AND ACETAMINOPHEN 5; 325 MG/1; MG/1
1 TABLET ORAL EVERY 8 HOURS PRN
Qty: 6 TABLET | Refills: 0 | Status: SHIPPED | OUTPATIENT
Start: 2024-08-17 | End: 2024-08-19

## 2024-08-17 RX ORDER — AMOXICILLIN AND CLAVULANATE POTASSIUM 875; 125 MG/1; MG/1
1 TABLET, FILM COATED ORAL EVERY 12 HOURS
Status: DISCONTINUED | OUTPATIENT
Start: 2024-08-17 | End: 2024-08-17 | Stop reason: HOSPADM

## 2024-08-17 RX ADMIN — HYDROCODONE BITARTRATE AND ACETAMINOPHEN 1 TABLET: 10; 325 TABLET ORAL at 07:08

## 2024-08-17 RX ADMIN — AMOXICILLIN AND CLAVULANATE POTASSIUM 1 TABLET: 875; 125 TABLET, FILM COATED ORAL at 09:08

## 2024-08-17 RX ADMIN — GABAPENTIN 100 MG: 100 CAPSULE ORAL at 09:08

## 2024-08-17 RX ADMIN — LEVOTHYROXINE SODIUM 200 MCG: 25 TABLET ORAL at 06:08

## 2024-08-17 RX ADMIN — PROPRANOLOL HYDROCHLORIDE 80 MG: 40 TABLET ORAL at 09:08

## 2024-08-17 RX ADMIN — PAROXETINE 20 MG: 10 TABLET, FILM COATED ORAL at 09:08

## 2024-08-17 RX ADMIN — PIPERACILLIN SODIUM AND TAZOBACTAM SODIUM 4.5 G: 4; .5 INJECTION, POWDER, FOR SOLUTION INTRAVENOUS at 02:08

## 2024-08-17 RX ADMIN — AMLODIPINE BESYLATE 5 MG: 5 TABLET ORAL at 09:08

## 2024-08-17 NOTE — PLAN OF CARE
[facility] - Med Surg [facility]  Discharge Final Note    Primary Care Provider: Delores Lazar MD    Expected Discharge Date: 8/17/2024    Final Discharge Note (most recent)       Final Note - 08/17/24 0925          Final Note    Assessment Type Final Discharge Note     Anticipated Discharge Disposition Home or Self Care        Post-Acute Status    Discharge Delays None known at this time                     Important Message from Medicare           Discharge home, no home health or dme orders noted.

## 2024-08-17 NOTE — PLAN OF CARE
[facility]  Initial Discharge Assessment       Primary Care Provider: Delores Lazar MD    Admission Diagnosis: Acute cholecystitis [K81.0]  Abdominal pain [R10.9]  Chest pain [R07.9]  Sepsis [A41.9]    Admission Date: 8/15/2024  Expected Discharge Date: 8/17/2024    Transition of Care Barriers: None    Payor: /     Extended Emergency Contact Information  Primary Emergency Contact: Nan Neri  Address: [id number] [place] [address] [place] [address] [place]  Mobile Phone: [phone]  Relation: Spouse  Secondary Emergency Contact: Diana Neri   [place]  Mobile Phone: [phone]  Relation: Daughter    Discharge Plan A: Home with family  Discharge Plan B: Home Health      [facility] [address] [facility] [place] - [address] [facility]  [address] [place] [address]  Phone: [phone] Fax: [phone]    [id number] - [place], [place] - [address] [facility] [address]  [address] [facility] [address]  Phone: [phone] Fax: [phone]      Initial Assessment (most recent)       Adult Discharge Assessment - 08/17/24 0923          Discharge Assessment    Assessment Type Discharge Planning Assessment     Confirmed/corrected address, phone number and insurance Yes     Source of Information patient     Communicated RUPERTO with patient/caregiver Date not available/Unable to determine     People in Home alone     Do you expect to return to your current living situation? Yes     Do you have help at home or someone to help you manage your care at home? Yes     Who are your caregiver(s) and their phone number(s)? spouse     Prior to hospitilization cognitive status: Alert/Oriented     Current cognitive status: Alert/Oriented     Walking or Climbing Stairs Difficulty no     Dressing/Bathing Difficulty no     Equipment Currently Used at Home none     Readmission within 30 days? No     Patient currently being followed by outpatient  case management? No     Do you currently have service(s) that help you manage your care at home? No     Do you take prescription medications? Yes     Do you have prescription coverage? Yes     Do you have any problems affording any of your prescribed medications? No     Is the patient taking medications as prescribed? yes     Who is going to help you get home at discharge? family     How do you get to doctors appointments? car, drives self     Are you on dialysis? No     Do you take coumadin? No     Discharge Plan A Home with family     Discharge Plan B Home Health     DME Needed Upon Discharge  none     Discharge Plan discussed with: Patient     Transition of Care Barriers None                      Independent with care.  Reports having insurance.  Copy of insurance card and face sheet given to ED registration per CM.

## 2024-08-17 NOTE — ASSESSMENT & PLAN NOTE
Patient's FSGs are controlled on current medication regimen.  Last A1c reviewed-   Lab Results   Component Value Date    HGBA1C 5.7 (H) 08/16/2024     Most recent fingerstick glucose reviewed-   Recent Labs   Lab 08/16/24  1544 08/16/24 2025 08/17/24  0351   POCTGLUCOSE 155* 150* 131*     Current correctional scale  Low  Maintain anti-hyperglycemic dose as follows-   Antihyperglycemics (From admission, onward)      Start     Stop Route Frequency Ordered    08/16/24 2111  insulin aspart U-100 pen 0-5 Units         -- SubQ Before meals & nightly PRN 08/16/24 2011          Hold Oral hypoglycemics while patient is in the hospital.  Hold Mounjaro while in the hospital. Advised to hold munjaro until follow up with PCP

## 2024-08-17 NOTE — DISCHARGE INSTRUCTIONS
Follow up with PCP and Dr. Tapia as soon as possible   Wait to resume Munjaro until you follow up with PCP   Zofran as needed for nausea   Norco as needed for pain

## 2024-08-17 NOTE — ASSESSMENT & PLAN NOTE
Status post robotic cholecystectomy     Regular diet   P.o. pain control   Ambulating   P.o. antibiotics x3 days  Okay to discharge from surgical standpoint   Follow up in clinic in 2 weeks

## 2024-08-17 NOTE — DISCHARGE SUMMARY
[facility]  [facility] Medicine  Discharge Summary      Patient Name: Wilman Neri  MRN: [id number]  TANYA: [id number]  Patient Class: IP- Inpatient  Admission Date: 8/15/2024  Hospital Length of Stay: 1 days  Discharge Date and Time:  08/17/2024 2:25 PM  Attending Physician: No att. providers found   Discharging Provider: Radha Barajas NP  Primary Care Provider: Delores Lazar MD    Primary Care Team: Networked reference to record PCT     HPI:   54-year-old white man with history of hypertension, hyperlipidemia, non-insulin-dependent diabetes mellitus type 2, hypothyroidism, depression, insomnia, hemispheric carotid artery syndrome, alcohol use, only occasional), and nephrolithiasis who presented to the emergency department with complaint of abdominal pain over the last 2-3 days, intermittent, 10/10 at its worst, currently 2/10 on the pain scale.  Abdominal pain was localized more so to the left lower quadrant but difficult to pinpoint per the patient.  He has noted some abdominal distention and had subjective fevers with chills.  He has had no associated nausea, vomiting, diarrhea, constipation, dysuria, or hematuria.  White blood cell count in our emergency department 16.01 with a T-max of a 100.0°.    No recent hospital admit.    Procedure(s) (LRB):  XI ROBOTIC CHOLECYSTECTOMY (N/A)      Hospital Course:   55 y/o male presented to the ER with c/o abdominal pain  Labs: WBC down trending from 16.01 to 10.7, H/H 12.3/37.1, Sodium 137, Potassium 3.9, Lactic acid 1.0, UA negative for UTI, BC: NGTD  CT abd/pelvis: Numerous stones within the gallbladder consistent with gallstones. Mild gallbladder wall thickening with slight fat stranding may relate to cholecystitis   Abd US: sonographic findings concerning for acute cholecystitis   General surgery consulted- s/p Robotic cholecystectomy with Dr. Tapia on 8/16/24      Patient able to tolerate regular diet, denies any nausea and vomiting. Reports bowel  movement postoperatively. Patient ambulating without assistance. Vitals signs and labs overall stable for discharge. Patient advised to follow up with PCP as soon as possible and Dr. Tapia in 2 weeks. Discharged with 3 days of augmentin, PRN zofran and PRN norco.  Patient seen and examined on the day of discharge.  All questions and concerns were addressed prior to discharge.    Face to face encounter with patient: 48 min      Goals of Care Treatment Preferences:  Code Status: Full Code       Consults:   Consults (From admission, onward)          Status Ordering Provider     Inpatient consult to General surgery  Once        Provider:  Stormy Chandra MD    Completed CESILIA CUMMINGS            Cardiac/Vascular  Hyperlipidemia  Can resume home lipitor at discharge       HTN (hypertension)  Chronic, controlled. Latest blood pressure and vitals reviewed-     Temp:  [97.3 °F (36.3 °C)-98.8 °F (37.1 °C)]   Pulse:  []   Resp:  [12-27]   BP: (121-145)/(68-80)   SpO2:  [87 %-99 %] .   Home meds for hypertension were reviewed and noted below.   Hypertension Medications               amLODIPine (NORVASC) 5 MG tablet Take 1 tablet by mouth once daily    lisinopriL-hydrochlorothiazide (PRINZIDE,ZESTORETIC) 20-12.5 mg per tablet Take 1 tablet by mouth once daily.    propranoloL (INDERAL LA) 120 MG 24 hr capsule Take 1 capsule by mouth once daily            While in the hospital, will manage blood pressure as follows; Adjust home antihypertensive regimen as follows- currently NPO with Norvasc, lisinopril/hydrochlorothiazide, and propranolol held.  P.r.n. IV hydralazine with parameters. P.r.n. pain control.    Will utilize p.r.n. blood pressure medication only if patient's blood pressure greater than 160/100 and he develops symptoms such as worsening chest pain or shortness of breath.    Renal/  Right nephrolithiasis  -right nephrolithiasis seen on abdominal ultrasound  -urinalysis negative  -patient reports his current  "pain symptoms and presentation are not consistent with his usual "passing of a kidney stone"      ID  Sepsis  This patient does have evidence of infective focus  My overall impression is sepsis.  Source: Abdominal  Antibiotics given-   Antibiotics (72h ago, onward)      Start     Stop Route Frequency Ordered    08/17/24 0915  amoxicillin-clavulanate 875-125mg per tablet 1 tablet         08/20/24 0859 Oral Every 12 hours 08/17/24 0802          Latest lactate reviewed-  Recent Labs   Lab 08/16/24  0013   LACTATE 1.0       Organ dysfunction not present    Fluid challenge Not needed - patient is not hypotensive      Post- resuscitation assessment No - Post resuscitation assessment not needed       Will Not start Pressors- Levophed for MAP of 65  Source control achieved by:  IV fluids, IV antibiotics, and surgical consultation    Discharged with 3 days of augmentin per recommendation of general surgery     Endocrine  Hyponatremia  Hyponatremia is likely due to Dehydration/hypovolemia. The patient's most recent sodium results are listed below.  Recent Labs     08/15/24  1755 08/16/24  0411 08/17/24  0459   * 136 137       Plan  - Correct the sodium by 4-6mEq in 24 hours.   - Obtain the following studies: TSH, T4.  - Will treat the hyponatremia with IV fluids as follows:  Normal saline at 100 mL/hr  - Monitor sodium Daily.   - Patient hyponatremia is improved       Diabetes mellitus type 2, noninsulin dependent  Patient's FSGs are controlled on current medication regimen.  Last A1c reviewed-   Lab Results   Component Value Date    HGBA1C 5.7 (H) 08/16/2024     Most recent fingerstick glucose reviewed-   Recent Labs   Lab 08/16/24  1544 08/16/24 2025 08/17/24  0351   POCTGLUCOSE 155* 150* 131*     Current correctional scale  Low  Maintain anti-hyperglycemic dose as follows-   Antihyperglycemics (From admission, onward)      Start     Stop Route Frequency Ordered    08/16/24 2111  insulin aspart U-100 pen 0-5 Units    " "     -- SubQ Before meals & nightly PRN 08/16/24 2011          Hold Oral hypoglycemics while patient is in the hospital.  Hold Mounjaro while in the hospital. Advised to hold munjaro until follow up with PCP     Hypothyroidism  -hold Synthroid while NPO  -recent TSH 0.224 (low)  -TSH 0.057 and free T4 1.31 here      GI  * Acute cholecystitis  Sepsis with acute cholecystitis  -white blood cell count 16.01, lactic acid level 1.1 and repeat 1.0, procalcitonin level 0.33, LFTs unremarkable, lipase 33, T-max a 100.0°, tachycardic on arrival  -CT scan of abdomen and pelvis with numerous stones within the gallbladder, mild gallbladder wall thickening with slight fat stranding may be secondary to cholecystitis  -abdominal ultrasound with Cholelithiasis with wall thickening measuring up to 5 mm.  Sonographic Liao sign is reported as negative.  Common bile duct measures 8 mm. Enlarged and fatty liver.  Liver measures 16.5 cm. Right nephrolithiasis."  - NPO, IV fluids, IV Zosyn, p.r.n. pain/nausea control  -general surgery consult s/p Robotic cholecystectomy with dr. Tapia 8/16/24  -patient able to tolerate diet and reports bowel movement post-operatively   -patient to follow up with dr. Tapia in 2 weeks   -patient to follow up with PCP with in 3-5 days   -discharged with PO augmentin x3 days per general surgery recommendations         Fatty liver  -LFTs unremarkable  -outpatient evaluation with PCP       Other  Depression  -Paxil okay to resume at discharge           Final Active Diagnoses:    Diagnosis Date Noted POA    PRINCIPAL PROBLEM:  Acute cholecystitis [K81.0] 08/16/2024 Yes    Sepsis [A41.9] 08/16/2024 Yes    Right nephrolithiasis [N20.0] 08/16/2024 Yes    Diabetes mellitus type 2, noninsulin dependent [E11.9] 08/16/2024 Yes    Hyperlipidemia [E78.5] 08/16/2024 Yes    Fatty liver [K76.0] 08/16/2024 Yes    Hyponatremia [E87.1] 08/16/2024 Yes    HTN (hypertension) [I10] 11/01/2013 Yes    Hypothyroidism [E03.9] " "11/01/2013 Yes    Depression [F32.A] 11/01/2013 Yes      Problems Resolved During this Admission:       Discharged Condition: good    Disposition: Home or Self Care    Follow Up:   Follow-up Information       Delores Lazar MD. Schedule an appointment as soon as possible for a visit.    Specialty: Family Medicine  Contact information:  [address] [address] [address] [phone]               Jean Tapia MD Follow up.    Specialties: General Surgery, Bariatrics  Contact information:  [address] [facility]  4th Floor  [facility] [address] [phone]                           Patient Instructions:      Activity as tolerated       Significant Diagnostic Studies: Labs: BMP:   Recent Labs   Lab 08/15/24  1755 08/16/24  0411 08/17/24  0459   * 101 118*   * 136 137   K 4.0 3.4* 3.9    101 103   CO2 18* 24 23   BUN 10 9 9   CREATININE 1.0 0.9 0.8   CALCIUM 9.5 9.0 8.4*   MG  --   --  1.8   , CMP   Recent Labs   Lab 08/15/24  1755 08/16/24  0411 08/17/24  0459   * 136 137   K 4.0 3.4* 3.9    101 103   CO2 18* 24 23   * 101 118*   BUN 10 9 9   CREATININE 1.0 0.9 0.8   CALCIUM 9.5 9.0 8.4*   PROT 7.5 6.1 5.3*   ALBUMIN 3.6 3.0* 2.8*   BILITOT 0.8 1.0 0.7   ALKPHOS 54* 51* 48*   AST 12 12 37   ALT 24 21 36   ANIONGAP 14 11 11   , CBC   Recent Labs   Lab 08/15/24  1755 08/16/24  0411 08/17/24  0459   WBC 16.01* 11.48 10.76   HGB 14.9 12.8* 12.3*   HCT 44.1 37.6* 37.1*    168 216   , INR   Lab Results   Component Value Date    INR 1.0 08/16/2024    INR 1.0 02/18/2022    INR 1.0 02/26/2021   , Lipid Panel   Lab Results   Component Value Date    CHOL 191 03/07/2024    HDL 34 (L) 03/07/2024    LDLCALC 95.8 03/07/2024    TRIG 306 (H) 03/07/2024    CHOLHDL 17.8 (L) 03/07/2024   , Troponin No results for input(s): "TROPONINI" in the last 168 hours., A1C:   Recent Labs   Lab 03/07/24  1136 08/16/24  0411   HGBA1C 6.4*  6.4* 5.7*   , and All labs within the past 24 hours " have been reviewed  Microbiology: Blood Culture   Lab Results   Component Value Date    LABBLOO No Growth to date 08/15/2024    LABBLOO No Growth to date 08/15/2024    LABBLOO No Growth to date 08/15/2024    LABBLOO No Growth to date 08/15/2024     Radiology: CT abd/pelvis: Numerous stones within the gallbladder consistent with gallstones. Mild gallbladder wall thickening with slight fat stranding may relate to cholecystitis   Abd US: sonographic findings concerning for acute cholecystitis     Pending Diagnostic Studies:       Procedure Component Value Units Date/Time    Specimen to Pathology, Surgery General Surgery [[id number]] Collected: 08/16/24 1519    Order Status: Sent Lab Status: In process Updated: 08/16/24 1520    Specimen: Tissue            Medications:  Reconciled Home Medications:      Medication List        START taking these medications      amoxicillin-clavulanate 875-125mg 875-125 mg per tablet  Commonly known as: AUGMENTIN  Take 1 tablet by mouth every 12 (twelve) hours. for 3 days     HYDROcodone-acetaminophen 5-325 mg per tablet  Commonly known as: NORCO  Take 1 tablet by mouth every 8 (eight) hours as needed for Pain.     ondansetron 4 MG Tbdl  Commonly known as: ZOFRAN-ODT  Take 1 tablet (4 mg total) by mouth 2 (two) times daily. for 2 days            CHANGE how you take these medications      MOUNJARO 2.5 mg/0.5 mL Pnij  Generic drug: tirzepatide  Inject 2.5 mg into the skin once a week.  What changed: additional instructions            CONTINUE taking these medications      amLODIPine 5 MG tablet  Commonly known as: NORVASC  Take 1 tablet by mouth once daily     atorvastatin 20 MG tablet  Commonly known as: LIPITOR  Take 1 tablet by mouth once daily     blood sugar diagnostic Strp  To check BG 2 times daily, to use with insurance preferred meter     blood-glucose meter kit  To check BG 2 times daily, to use with insurance preferred meter     co-enzyme Q-10 30 mg capsule  Take 30 mg by mouth 3  (three) times daily.     gabapentin 100 MG capsule  Commonly known as: NEURONTIN  Take 1 capsule (100 mg total) by mouth 3 (three) times daily.     lancets Misc  To check BG 2 times daily, to use with insurance preferred meter     levothyroxine 200 MCG tablet  Commonly known as: SYNTHROID  Take 1 tablet (200 mcg total) by mouth once daily.     lisinopriL-hydrochlorothiazide 20-12.5 mg per tablet  Commonly known as: PRINZIDE,ZESTORETIC  Take 1 tablet by mouth once daily.     melatonin 5 mg  Commonly known as: MELATIN  Take 5 mg by mouth every evening.     modafiniL 200 MG Tab  Commonly known as: PROVIGIL  Take 1 tablet by mouth once daily     paroxetine 20 MG tablet  Commonly known as: PAXIL  Take 1 tablet by mouth once daily     propranoloL 120 MG 24 hr capsule  Commonly known as: INDERAL LA  Take 1 capsule by mouth once daily     sildenafiL 50 MG tablet  Commonly known as: VIAGRA  Take 1 tablet (50 mg total) by mouth daily as needed for Erectile Dysfunction.            STOP taking these medications      butalbital-acetaminophen-caffeine -40 mg -40 mg per tablet  Commonly known as: FIORICET, ESGIC     eszopiclone 2 MG Tab  Commonly known as: LUNESTA              Indwelling Lines/Drains at time of discharge:   Lines/Drains/Airways       None                   Time spent on the discharge of patient: 48 minutes    The patient's case has been reviewed by my supervising physician.   Supervising physician will provide additional orders and recommendations at his/her discretion.  Please see supervising physicians documentation and/or orders for further details.            Radha Barajas NP  Department of Hospital Medicine  [facility]

## 2024-08-17 NOTE — ASSESSMENT & PLAN NOTE
Chronic, controlled. Latest blood pressure and vitals reviewed-     Temp:  [97.3 °F (36.3 °C)-98.8 °F (37.1 °C)]   Pulse:  []   Resp:  [12-27]   BP: (121-145)/(68-80)   SpO2:  [87 %-99 %] .   Home meds for hypertension were reviewed and noted below.   Hypertension Medications               amLODIPine (NORVASC) 5 MG tablet Take 1 tablet by mouth once daily    lisinopriL-hydrochlorothiazide (PRINZIDE,ZESTORETIC) 20-12.5 mg per tablet Take 1 tablet by mouth once daily.    propranoloL (INDERAL LA) 120 MG 24 hr capsule Take 1 capsule by mouth once daily            While in the hospital, will manage blood pressure as follows; Adjust home antihypertensive regimen as follows- currently NPO with Norvasc, lisinopril/hydrochlorothiazide, and propranolol held.  P.r.n. IV hydralazine with parameters. P.r.n. pain control.    Will utilize p.r.n. blood pressure medication only if patient's blood pressure greater than 160/100 and he develops symptoms such as worsening chest pain or shortness of breath.

## 2024-08-17 NOTE — PROGRESS NOTES
[facility]  General Surgery  Progress Note    Subjective:     History of Present Illness:  Wilman Neri is a 54 y.o. male who presented to the emergency department with a several day history of abdominal pain.  He states the pain started on Tuesday night and has been increasing in severity.  He has no history of similar symptoms.  He did try some laxatives in the like but without any relief.  He does admit to chills however no nausea or vomiting.  Upon presentation to the emergency department his temperature was a 100° F and he was mildly tachycardic at 1:14 a.m..  His blood pressure was okay.  Workup in the ER revealed a leukocytosis of 16 K and labs were otherwise normal including his LFTs.  CT scan was performed to rule out diverticulitis and showed numerous stones within the gallbladder consistent with gallstones. Mild gallbladder wall thickening with slight fat stranding may relate to cholecystitis. Subsequent RUQ US showed stones and wall thickening concerning for cholecystitis.  He was admitted to hospital medicine.    Post-Op Info:  Procedure(s) (LRB):  XI ROBOTIC CHOLECYSTECTOMY (N/A)   1 Day Post-Op     Interval History:  Status post robotic cholecystectomy, pain well controlled, tolerating diet, ambulating,     Medications:  Continuous Infusions:   0.9% NaCl   Intravenous Continuous 100 mL/hr at 08/16/24 0414 New Bag at 08/16/24 0414     Scheduled Meds:   amLODIPine  5 mg Oral Daily    amoxicillin-clavulanate 875-125mg  1 tablet Oral Q12H    atorvastatin  20 mg Oral QHS    gabapentin  100 mg Oral TID    levothyroxine  200 mcg Oral Before breakfast    modafiniL  200 mg Oral Daily    paroxetine  20 mg Oral Daily    propranoloL  80 mg Oral BID     PRN Meds:  Current Facility-Administered Medications:     acetaminophen, 650 mg, Rectal, Q6H PRN    dextrose 10%, 12.5 g, Intravenous, PRN    dextrose 10%, 12.5 g, Intravenous, PRN    dextrose 10%, 25 g, Intravenous, PRN    dextrose 10%, 25 g,  Intravenous, PRN    glucagon (human recombinant), 1 mg, Intramuscular, PRN    glucagon (human recombinant), 1 mg, Intramuscular, PRN    glucose, 16 g, Oral, PRN    glucose, 16 g, Oral, PRN    glucose, 24 g, Oral, PRN    glucose, 24 g, Oral, PRN    hydrALAZINE, 10 mg, Intravenous, Q6H PRN    HYDROcodone-acetaminophen, 1 tablet, Oral, Q4H PRN    insulin aspart U-100, 0-5 Units, Subcutaneous, QID (AC + HS) PRN    morphine, 2 mg, Intravenous, Q4H PRN    naloxone, 0.02 mg, Intravenous, PRN    sodium chloride 0.9%, 10 mL, Intravenous, Q12H PRN     Review of patient's allergies indicates:  No Known Allergies  Objective:     Vital Signs (Most Recent):  Temp: 98.8 °F (37.1 °C) (08/17/24 0748)  Pulse: 108 (08/17/24 0748)  Resp: 18 (08/17/24 0748)  BP: 132/75 (08/17/24 0748)  SpO2: (!) 92 % (08/17/24 0748) Vital Signs (24h Range):  Temp:  [97.3 °F (36.3 °C)-98.8 °F (37.1 °C)] 98.8 °F (37.1 °C)  Pulse:  [] 108  Resp:  [12-27] 18  SpO2:  [87 %-99 %] 92 %  BP: (121-145)/(68-89) 132/75     Weight: 98 kg (216 lb)  Body mass index is 30.99 kg/m².    Intake/Output - Last 3 Shifts         08/15 0700  08/16 0659 08/16 0700  08/17 0659 08/17 0700  08/18 0659    IV Piggyback 1100 1200     Total Intake(mL/kg) 1100 (11.2) 1200 (12.2)     Net +1100 +1200                     Physical Exam  Vitals reviewed.   Constitutional:       Appearance: He is well-developed.   HENT:      Head: Normocephalic and atraumatic.   Cardiovascular:      Rate and Rhythm: Normal rate.   Pulmonary:      Effort: Pulmonary effort is normal.   Abdominal:      General: There is no distension.      Palpations: Abdomen is soft.      Tenderness: There is abdominal tenderness (appropriate).      Comments: Incisions clean dry and intact   Musculoskeletal:      Cervical back: Normal range of motion and neck supple.   Skin:     General: Skin is warm and dry.   Neurological:      Mental Status: He is alert and oriented to person, place, and time.          Significant  Labs:  I have reviewed all pertinent lab results within the past 24 hours.  CBC:   Recent Labs   Lab 08/17/24  0459   WBC 10.76   RBC 4.40*   HGB 12.3*   HCT 37.1*      MCV 84   MCH 28.0   MCHC 33.2     CMP:   Recent Labs   Lab 08/17/24  0459   *   CALCIUM 8.4*   ALBUMIN 2.8*   PROT 5.3*      K 3.9   CO2 23      BUN 9   CREATININE 0.8   ALKPHOS 48*   ALT 36   AST 37   BILITOT 0.7       Significant Diagnostics:    Assessment/Plan:     * Acute cholecystitis  Status post robotic cholecystectomy     Regular diet   P.o. pain control   Ambulating   P.o. antibiotics x3 days  Okay to discharge from surgical standpoint   Follow up in clinic in 2 weeks    Hyponatremia  -- Management as per primary team     Fatty liver  -- Management as per primary team     Hyperlipidemia  -- Management as per primary team     Diabetes mellitus type 2, noninsulin dependent  -- Management as per primary team     Right nephrolithiasis  -- Management as per primary team     Depression  -- Management as per primary team     Hypothyroidism  -- Management as per primary team     HTN (hypertension)  -- Management as per primary team         Jean Tapia MD  General Surgery  [facility]

## 2024-08-17 NOTE — SUBJECTIVE & OBJECTIVE
Interval History:  Status post robotic cholecystectomy, pain well controlled, tolerating diet, ambulating,     Medications:  Continuous Infusions:   0.9% NaCl   Intravenous Continuous 100 mL/hr at 08/16/24 0414 New Bag at 08/16/24 0414     Scheduled Meds:   amLODIPine  5 mg Oral Daily    amoxicillin-clavulanate 875-125mg  1 tablet Oral Q12H    atorvastatin  20 mg Oral QHS    gabapentin  100 mg Oral TID    levothyroxine  200 mcg Oral Before breakfast    modafiniL  200 mg Oral Daily    paroxetine  20 mg Oral Daily    propranoloL  80 mg Oral BID     PRN Meds:  Current Facility-Administered Medications:     acetaminophen, 650 mg, Rectal, Q6H PRN    dextrose 10%, 12.5 g, Intravenous, PRN    dextrose 10%, 12.5 g, Intravenous, PRN    dextrose 10%, 25 g, Intravenous, PRN    dextrose 10%, 25 g, Intravenous, PRN    glucagon (human recombinant), 1 mg, Intramuscular, PRN    glucagon (human recombinant), 1 mg, Intramuscular, PRN    glucose, 16 g, Oral, PRN    glucose, 16 g, Oral, PRN    glucose, 24 g, Oral, PRN    glucose, 24 g, Oral, PRN    hydrALAZINE, 10 mg, Intravenous, Q6H PRN    HYDROcodone-acetaminophen, 1 tablet, Oral, Q4H PRN    insulin aspart U-100, 0-5 Units, Subcutaneous, QID (AC + HS) PRN    morphine, 2 mg, Intravenous, Q4H PRN    naloxone, 0.02 mg, Intravenous, PRN    sodium chloride 0.9%, 10 mL, Intravenous, Q12H PRN     Review of patient's allergies indicates:  No Known Allergies  Objective:     Vital Signs (Most Recent):  Temp: 98.8 °F (37.1 °C) (08/17/24 0748)  Pulse: 108 (08/17/24 0748)  Resp: 18 (08/17/24 0748)  BP: 132/75 (08/17/24 0748)  SpO2: (!) 92 % (08/17/24 0748) Vital Signs (24h Range):  Temp:  [97.3 °F (36.3 °C)-98.8 °F (37.1 °C)] 98.8 °F (37.1 °C)  Pulse:  [] 108  Resp:  [12-27] 18  SpO2:  [87 %-99 %] 92 %  BP: (121-145)/(68-89) 132/75     Weight: 98 kg (216 lb)  Body mass index is 30.99 kg/m².    Intake/Output - Last 3 Shifts         08/15 0700 08/16 0659 08/16 0700 08/17 0659 08/17  0700  08/18 0659    IV Piggyback 1100 1200     Total Intake(mL/kg) 1100 (11.2) 1200 (12.2)     Net +1100 +1200                     Physical Exam  Vitals reviewed.   Constitutional:       Appearance: He is well-developed.   HENT:      Head: Normocephalic and atraumatic.   Cardiovascular:      Rate and Rhythm: Normal rate.   Pulmonary:      Effort: Pulmonary effort is normal.   Abdominal:      General: There is no distension.      Palpations: Abdomen is soft.      Tenderness: There is abdominal tenderness (appropriate).      Comments: Incisions clean dry and intact   Musculoskeletal:      Cervical back: Normal range of motion and neck supple.   Skin:     General: Skin is warm and dry.   Neurological:      Mental Status: He is alert and oriented to person, place, and time.          Significant Labs:  I have reviewed all pertinent lab results within the past 24 hours.  CBC:   Recent Labs   Lab 08/17/24  0459   WBC 10.76   RBC 4.40*   HGB 12.3*   HCT 37.1*      MCV 84   MCH 28.0   MCHC 33.2     CMP:   Recent Labs   Lab 08/17/24  0459   *   CALCIUM 8.4*   ALBUMIN 2.8*   PROT 5.3*      K 3.9   CO2 23      BUN 9   CREATININE 0.8   ALKPHOS 48*   ALT 36   AST 37   BILITOT 0.7       Significant Diagnostics:

## 2024-08-17 NOTE — HOSPITAL COURSE
55 y/o male presented to the ER with c/o abdominal pain  Labs: WBC down trending from 16.01 to 10.7, H/H 12.3/37.1, Sodium 137, Potassium 3.9, Lactic acid 1.0, UA negative for UTI, BC: NGTD  CT abd/pelvis: Numerous stones within the gallbladder consistent with gallstones. Mild gallbladder wall thickening with slight fat stranding may relate to cholecystitis   Abd US: sonographic findings concerning for acute cholecystitis   General surgery consulted- s/p Robotic cholecystectomy with Dr. Tapia on 8/16/24      Patient able to tolerate regular diet, denies any nausea and vomiting. Reports bowel movement postoperatively. Patient ambulating without assistance. Vitals signs and labs overall stable for discharge. Patient advised to follow up with PCP as soon as possible and Dr. Tapia in 2 weeks. Discharged with 3 days of augmentin, PRN zofran and PRN norco.  Patient seen and examined on the day of discharge.  All questions and concerns were addressed prior to discharge.    Face to face encounter with patient: 48 min

## 2024-08-17 NOTE — ASSESSMENT & PLAN NOTE
"Sepsis with acute cholecystitis  -white blood cell count 16.01, lactic acid level 1.1 and repeat 1.0, procalcitonin level 0.33, LFTs unremarkable, lipase 33, T-max a 100.0°, tachycardic on arrival  -CT scan of abdomen and pelvis with numerous stones within the gallbladder, mild gallbladder wall thickening with slight fat stranding may be secondary to cholecystitis  -abdominal ultrasound with Cholelithiasis with wall thickening measuring up to 5 mm.  Sonographic Liao sign is reported as negative.  Common bile duct measures 8 mm. Enlarged and fatty liver.  Liver measures 16.5 cm. Right nephrolithiasis."  - NPO, IV fluids, IV Zosyn, p.r.n. pain/nausea control  -general surgery consult s/p Robotic cholecystectomy with dr. Tapia 8/16/24  -patient able to tolerate diet and reports bowel movement post-operatively   -patient to follow up with dr. Tapia in 2 weeks   -patient to follow up with PCP with in 3-5 days   -discharged with PO augmentin x3 days per general surgery recommendations       "

## 2024-08-17 NOTE — ASSESSMENT & PLAN NOTE
Hyponatremia is likely due to Dehydration/hypovolemia. The patient's most recent sodium results are listed below.  Recent Labs     08/15/24  1755 08/16/24  0411 08/17/24  0459   * 136 137       Plan  - Correct the sodium by 4-6mEq in 24 hours.   - Obtain the following studies: TSH, T4.  - Will treat the hyponatremia with IV fluids as follows:  Normal saline at 100 mL/hr  - Monitor sodium Daily.   - Patient hyponatremia is improved

## 2024-08-17 NOTE — ASSESSMENT & PLAN NOTE
This patient does have evidence of infective focus  My overall impression is sepsis.  Source: Abdominal  Antibiotics given-   Antibiotics (72h ago, onward)      Start     Stop Route Frequency Ordered    08/17/24 0915  amoxicillin-clavulanate 875-125mg per tablet 1 tablet         08/20/24 0859 Oral Every 12 hours 08/17/24 0802          Latest lactate reviewed-  Recent Labs   Lab 08/16/24  0013   LACTATE 1.0       Organ dysfunction not present    Fluid challenge Not needed - patient is not hypotensive      Post- resuscitation assessment No - Post resuscitation assessment not needed       Will Not start Pressors- Levophed for MAP of 65  Source control achieved by:  IV fluids, IV antibiotics, and surgical consultation    Discharged with 3 days of augmentin per recommendation of general surgery

## 2024-08-17 NOTE — CARE UPDATE
Chart reviewed. Patient admitted this morning with acute cholecystitis, now s/p robotic cholecystectomy this afternoon by Dr. Tapia. Started on regular diet post-op. Re-ordered home medications, ordered AM labs.

## 2024-08-19 ENCOUNTER — PATIENT MESSAGE (OUTPATIENT)
Dept: SURGERY | Facility: CLINIC | Age: 54
End: 2024-08-19
Payer: COMMERCIAL

## 2024-08-21 LAB
BACTERIA BLD CULT: NORMAL
BACTERIA BLD CULT: NORMAL

## 2024-08-22 LAB
FINAL PATHOLOGIC DIAGNOSIS: NORMAL
GROSS: NORMAL
Lab: NORMAL

## 2024-08-28 ENCOUNTER — OFFICE VISIT (OUTPATIENT)
Dept: SURGERY | Facility: CLINIC | Age: 54
End: 2024-08-28
Payer: COMMERCIAL

## 2024-08-28 VITALS — SYSTOLIC BLOOD PRESSURE: 127 MMHG | DIASTOLIC BLOOD PRESSURE: 80 MMHG | HEART RATE: 76 BPM

## 2024-08-28 DIAGNOSIS — K81.0 ACUTE CHOLECYSTITIS: Primary | ICD-10-CM

## 2024-08-28 PROCEDURE — 99024 POSTOP FOLLOW-UP VISIT: CPT | Mod: S$GLB,,,

## 2024-08-28 PROCEDURE — 99999 PR PBB SHADOW E&M-EST. PATIENT-LVL III: CPT | Mod: PBBFAC,,,

## 2024-08-28 NOTE — PROGRESS NOTES
History & Physical    Subjective     History of Present Illness:  Patient is a 54 y.o. male who presents for post-operative evaluation s/p robotic cholecystectomy. He presented to the ED and was found to have acute cholecystitis, which prompted surgery. He is doing well today and offers no complaints. He is feeling much better than pre-operatively. He denies any fevers, chills, nausea, and vomiting.    Chief Complaint   Patient presents with    Post-op Evaluation     S/p robotic cholecystectomy        Review of patient's allergies indicates:  No Known Allergies    Current Outpatient Medications   Medication Sig Dispense Refill    amLODIPine (NORVASC) 5 MG tablet Take 1 tablet by mouth once daily 90 tablet 3    atorvastatin (LIPITOR) 20 MG tablet Take 1 tablet by mouth once daily 90 tablet 3    blood sugar diagnostic Strp To check BG 2 times daily, to use with insurance preferred meter 100 each 3    blood-glucose meter kit To check BG 2 times daily, to use with insurance preferred meter 1 each 0    co-enzyme Q-10 30 mg capsule Take 30 mg by mouth 3 (three) times daily.      gabapentin (NEURONTIN) 100 MG capsule Take 1 capsule (100 mg total) by mouth 3 (three) times daily. 90 capsule 11    lancets Misc To check BG 2 times daily, to use with insurance preferred meter 100 each 3    levothyroxine (SYNTHROID) 200 MCG tablet Take 1 tablet (200 mcg total) by mouth once daily. 90 tablet 3    lisinopriL-hydrochlorothiazide (PRINZIDE,ZESTORETIC) 20-12.5 mg per tablet Take 1 tablet by mouth once daily. 90 tablet 3    melatonin (MELATIN) 5 mg Take 5 mg by mouth every evening.      modafiniL (PROVIGIL) 200 MG Tab Take 1 tablet by mouth once daily 90 tablet 0    paroxetine (PAXIL) 20 MG tablet Take 1 tablet by mouth once daily 90 tablet 3    propranoloL (INDERAL LA) 120 MG 24 hr capsule Take 1 capsule by mouth once daily 90 capsule 3    sildenafil (VIAGRA) 50 MG tablet Take 1 tablet (50 mg total) by mouth daily as needed for  Erectile Dysfunction. 30 tablet 11    tirzepatide (MOUNJARO) 2.5 mg/0.5 mL PnIj Inject 2.5 mg into the skin once a week. 12 Pen 1     No current facility-administered medications for this visit.       Past Medical History:   Diagnosis Date    Anxiety     Depression     Diabetes mellitus 06/2017    BS doesn't check 06/02/2023    Hypertension     Hypothyroid     Sleep disorder     Squamous cell carcinoma of skin 10/2019    right shoulder    Stroke      Past Surgical History:   Procedure Laterality Date    BACK SURGERY      2 juan pablo 6 bolts lower back     COLONOSCOPY N/A 3/7/2019    Procedure: COLONOSCOPY;  Surgeon: Estephanie Koch MD;  Location: [facility], [place] END[facility];  Service: Endoscopy;  Laterality: N/A;    EXTERNAL EAR SURGERY      MODIFIED RADICAL MASTECTOMY W/ AXILLARY LYMPH NODE DISSECTION      ROBOT-ASSISTED CHOLECYSTECTOMY USING DA LAUREN XI N/A 8/16/2024    Procedure: XI ROBOTIC CHOLECYSTECTOMY;  Surgeon: Jean Tapia MD;  Location: [facility], [place] OR;  Service: General;  Laterality: N/A;     Family History   Problem Relation Name Age of Onset    Cancer Mother      Cataracts Mother      Cancer Father      Cataracts Father      Macular degeneration Father      Diabetes Maternal Grandmother      Blindness Neg Hx      Glaucoma Neg Hx      Hypertension Neg Hx      Retinal detachment Neg Hx      Strabismus Neg Hx      Stroke Neg Hx      Thyroid disease Neg Hx       Social History     Tobacco Use    Smoking status: Former     Current packs/day: 1.00     Types: Vaping w/o nicotine, Cigarettes    Smokeless tobacco: Never    Tobacco comments:     vape    Substance Use Topics    Alcohol use: No     Comment: couple times a year    Drug use: No        Review of Systems:  Review of Systems   Constitutional:  Negative for chills, fever and unexpected weight change.   HENT:  Negative for congestion.    Eyes:  Negative for visual disturbance.   Respiratory:  Negative for shortness of breath.    Cardiovascular:  Negative for chest pain.    Gastrointestinal:  Negative for abdominal distention, abdominal pain, constipation, nausea, rectal pain and vomiting.   Genitourinary:  Negative for dysuria.   Musculoskeletal:  Negative for arthralgias.   Skin:  Negative for rash.   Neurological:  Negative for light-headedness.   Hematological:  Negative for adenopathy.          Objective     Vital Signs (Most Recent)  Pulse: 76 (08/28/24 1236)  BP: 127/80 (08/28/24 1236)           Physical Exam:  Physical Exam  Vitals reviewed.   Constitutional:       General: He is not in acute distress.     Appearance: He is well-developed. He is not toxic-appearing.   HENT:      Head: Normocephalic and atraumatic.      Right Ear: External ear normal.      Left Ear: External ear normal.      Nose: Nose normal.      Mouth/Throat:      Mouth: Mucous membranes are moist.   Eyes:      Extraocular Movements: Extraocular movements intact.      Conjunctiva/sclera: Conjunctivae normal.   Cardiovascular:      Rate and Rhythm: Normal rate.   Pulmonary:      Effort: Pulmonary effort is normal. No respiratory distress.   Abdominal:      Comments: Abdomen soft, non-tender, and non-distended. Incisions CDI, no SOI   Musculoskeletal:      Cervical back: Normal range of motion and neck supple.   Skin:     General: Skin is warm and dry.   Neurological:      Mental Status: He is alert and oriented to person, place, and time.   Psychiatric:         Behavior: Behavior normal.         Pathology:  Component 12 d ago   Final Pathologic Diagnosis Gallbladder, cholecystectomy:  - Acute and chronic cholecystitis.    - Cholelithiasis.            Assessment and Plan   55 y/o male with acute cholecystitis now s/p robotic cholecystectomy who is recovering as expected.    - No further interventions or restrictions.  - Pathology reviewed as above, benign  - RTC as needed or if any issues arise        Julieth Santana PA-C  [facility]

## 2024-08-28 NOTE — LETTER
August 28, 2024      The [address]  [address] [facility]  [place] [address]  Phone: [phone]  Fax: [phone]       Patient: Wilman Neri   YOB: 1970  Date of Visit: 08/28/2024    To Whom It May Concern:    Jessica Neri  was at [facility] on 08/28/2024. He was inpatient and under our care from 08/15/2024 until 08/17/2024 and needed assistance from family.  If you have any questions or concerns, or if I can be of further assistance, please do not hesitate to contact me.    Sincerely,        Julieth Santana PA-C

## 2024-09-17 RX ORDER — LEVOTHYROXINE SODIUM 200 UG/1
200 TABLET ORAL DAILY
Qty: 90 TABLET | Refills: 3 | Status: SHIPPED | OUTPATIENT
Start: 2024-09-17

## 2024-09-17 NOTE — TELEPHONE ENCOUNTER
No care due was identified.  [facility] Embedded Care Due Messages. Reference number: [id number].   9/17/2024 9:11:56 AM CDT

## 2024-10-07 RX ORDER — LISINOPRIL AND HYDROCHLOROTHIAZIDE 12.5; 2 MG/1; MG/1
1 TABLET ORAL DAILY
Qty: 90 TABLET | Refills: 0 | Status: SHIPPED | OUTPATIENT
Start: 2024-10-07

## 2024-10-07 NOTE — TELEPHONE ENCOUNTER
No care due was identified.  [facility] Embedded Care Due Messages. Reference number: [id number].   10/07/2024 5:33:32 AM CDT

## 2024-10-13 DIAGNOSIS — G47.00 INSOMNIA, UNSPECIFIED TYPE: ICD-10-CM

## 2024-10-14 RX ORDER — ESZOPICLONE 2 MG/1
2 TABLET, FILM COATED ORAL NIGHTLY
Qty: 90 TABLET | Refills: 0 | Status: SHIPPED | OUTPATIENT
Start: 2024-10-14

## 2024-10-14 NOTE — TELEPHONE ENCOUNTER
No care due was identified.  [facility] Embedded Care Due Messages. Reference number: [id number].   10/13/2024 7:24:26 PM CDT

## 2024-11-11 RX ORDER — TIRZEPATIDE 2.5 MG/.5ML
2.5 INJECTION, SOLUTION SUBCUTANEOUS WEEKLY
Qty: 12 PEN | Refills: 1 | Status: SHIPPED | OUTPATIENT
Start: 2024-11-11

## 2024-11-11 NOTE — TELEPHONE ENCOUNTER
No care due was identified.  [facility] Embedded Care Due Messages. Reference number: [id number].   11/11/2024 10:32:09 AM CST

## 2024-11-19 DIAGNOSIS — G47.00 INSOMNIA, UNSPECIFIED TYPE: Primary | ICD-10-CM

## 2024-11-19 RX ORDER — MODAFINIL 200 MG/1
200 TABLET ORAL
Qty: 90 TABLET | Refills: 0 | Status: SHIPPED | OUTPATIENT
Start: 2024-11-19

## 2024-12-18 ENCOUNTER — PATIENT MESSAGE (OUTPATIENT)
Dept: FAMILY MEDICINE | Facility: CLINIC | Age: 54
End: 2024-12-18
Payer: COMMERCIAL

## 2024-12-18 DIAGNOSIS — E11.65 UNCONTROLLED TYPE 2 DIABETES MELLITUS WITH HYPERGLYCEMIA: Primary | ICD-10-CM

## 2024-12-18 DIAGNOSIS — I10 PRIMARY HYPERTENSION: ICD-10-CM

## 2024-12-18 DIAGNOSIS — M25.511 CHRONIC RIGHT SHOULDER PAIN: ICD-10-CM

## 2024-12-18 DIAGNOSIS — G89.29 CHRONIC RIGHT SHOULDER PAIN: ICD-10-CM

## 2024-12-18 DIAGNOSIS — E03.4 HYPOTHYROIDISM DUE TO ACQUIRED ATROPHY OF THYROID: ICD-10-CM

## 2024-12-18 DIAGNOSIS — F32.A DEPRESSION, UNSPECIFIED DEPRESSION TYPE: ICD-10-CM

## 2024-12-18 DIAGNOSIS — E78.2 MIXED HYPERLIPIDEMIA: ICD-10-CM

## 2024-12-18 DIAGNOSIS — R25.1 TREMORS OF NERVOUS SYSTEM: ICD-10-CM

## 2024-12-18 DIAGNOSIS — G47.00 INSOMNIA, UNSPECIFIED TYPE: ICD-10-CM

## 2024-12-18 RX ORDER — AMLODIPINE BESYLATE 5 MG/1
5 TABLET ORAL DAILY
Qty: 90 TABLET | Refills: 0 | Status: SHIPPED | OUTPATIENT
Start: 2024-12-18

## 2024-12-18 RX ORDER — ATORVASTATIN CALCIUM 20 MG/1
20 TABLET, FILM COATED ORAL DAILY
Qty: 90 TABLET | Refills: 0 | Status: SHIPPED | OUTPATIENT
Start: 2024-12-18

## 2024-12-18 RX ORDER — ESZOPICLONE 2 MG/1
2 TABLET, FILM COATED ORAL NIGHTLY
Qty: 90 TABLET | Refills: 0 | Status: SHIPPED | OUTPATIENT
Start: 2024-12-18

## 2024-12-18 RX ORDER — LISINOPRIL AND HYDROCHLOROTHIAZIDE 12.5; 2 MG/1; MG/1
1 TABLET ORAL DAILY
Qty: 90 TABLET | Refills: 0 | Status: SHIPPED | OUTPATIENT
Start: 2024-12-18

## 2024-12-18 RX ORDER — MODAFINIL 200 MG/1
200 TABLET ORAL DAILY
Qty: 90 TABLET | Refills: 0 | Status: SHIPPED | OUTPATIENT
Start: 2024-12-18

## 2024-12-18 RX ORDER — TIRZEPATIDE 2.5 MG/.5ML
2.5 INJECTION, SOLUTION SUBCUTANEOUS WEEKLY
Qty: 12 PEN | Refills: 0 | Status: SHIPPED | OUTPATIENT
Start: 2024-12-18

## 2024-12-18 RX ORDER — GABAPENTIN 100 MG/1
100 CAPSULE ORAL 3 TIMES DAILY
Qty: 270 CAPSULE | Refills: 0 | Status: SHIPPED | OUTPATIENT
Start: 2024-12-18 | End: 2025-03-18

## 2024-12-18 RX ORDER — PAROXETINE HYDROCHLORIDE 20 MG/1
20 TABLET, FILM COATED ORAL DAILY
Qty: 90 TABLET | Refills: 3 | Status: SHIPPED | OUTPATIENT
Start: 2024-12-18

## 2024-12-18 RX ORDER — LEVOTHYROXINE SODIUM 200 UG/1
200 TABLET ORAL DAILY
Qty: 90 TABLET | Refills: 0 | Status: SHIPPED | OUTPATIENT
Start: 2024-12-18

## 2024-12-18 RX ORDER — PROPRANOLOL HYDROCHLORIDE 120 MG/1
120 CAPSULE, EXTENDED RELEASE ORAL DAILY
Qty: 90 CAPSULE | Refills: 0 | Status: SHIPPED | OUTPATIENT
Start: 2024-12-18

## 2024-12-18 NOTE — TELEPHONE ENCOUNTER
Care Due:                  Date            Visit Type   Department     Provider  --------------------------------------------------------------------------------                                EP -                              PRIMARY      [facility]  Last Visit: 06-      CARE ([facility])   MEDICINE       Delores Lazar  Next Visit: None Scheduled  None         None Found                                                            Last  Test          Frequency    Reason                     Performed    Due Date  --------------------------------------------------------------------------------    HBA1C.......  6 months...  tirzepatide..............  08- 02-    Lipid Panel.  12 months..  atorvastatin.............  03- 03-    [facility] Embedded Care Due Messages. Reference number: [id number].   12/18/2024 2:22:03 PM CST

## 2025-03-19 DIAGNOSIS — E11.9 TYPE 2 DIABETES MELLITUS WITHOUT COMPLICATION: ICD-10-CM

## 2025-03-23 DIAGNOSIS — E11.65 UNCONTROLLED TYPE 2 DIABETES MELLITUS WITH HYPERGLYCEMIA: ICD-10-CM

## 2025-03-23 NOTE — TELEPHONE ENCOUNTER
Care Due:                  Date            Visit Type   Department     Provider  --------------------------------------------------------------------------------                                EP -                              PRIMARY      [facility]  Last Visit: 06-      CARE ([facility])   MEDICINE       Delores Lazar  Next Visit: None Scheduled  None         None Found                                                            Last  Test          Frequency    Reason                     Performed    Due Date  --------------------------------------------------------------------------------    HBA1C.......  6 months...  tirzepatide..............  08- 02-    Lipid Panel.  12 months..  atorvastatin.............  03- 03-    [facility] Embedded Care Due Messages. Reference number: [id number].   3/23/2025 10:12:20 AM CDT

## 2025-03-24 RX ORDER — TIRZEPATIDE 2.5 MG/.5ML
INJECTION, SOLUTION SUBCUTANEOUS
Qty: 12 ML | Refills: 0 | Status: SHIPPED | OUTPATIENT
Start: 2025-03-24

## 2025-04-03 ENCOUNTER — PATIENT MESSAGE (OUTPATIENT)
Dept: FAMILY MEDICINE | Facility: CLINIC | Age: 55
End: 2025-04-03
Payer: COMMERCIAL

## 2025-04-03 DIAGNOSIS — F32.A DEPRESSION, UNSPECIFIED DEPRESSION TYPE: ICD-10-CM

## 2025-04-03 DIAGNOSIS — I10 PRIMARY HYPERTENSION: ICD-10-CM

## 2025-04-03 DIAGNOSIS — R25.1 TREMORS OF NERVOUS SYSTEM: ICD-10-CM

## 2025-04-03 DIAGNOSIS — G47.00 INSOMNIA, UNSPECIFIED TYPE: ICD-10-CM

## 2025-04-03 DIAGNOSIS — E78.2 MIXED HYPERLIPIDEMIA: ICD-10-CM

## 2025-04-03 DIAGNOSIS — M25.511 CHRONIC RIGHT SHOULDER PAIN: ICD-10-CM

## 2025-04-03 DIAGNOSIS — E11.65 UNCONTROLLED TYPE 2 DIABETES MELLITUS WITH HYPERGLYCEMIA: ICD-10-CM

## 2025-04-03 DIAGNOSIS — G89.29 CHRONIC RIGHT SHOULDER PAIN: ICD-10-CM

## 2025-04-03 DIAGNOSIS — E03.4 HYPOTHYROIDISM DUE TO ACQUIRED ATROPHY OF THYROID: ICD-10-CM

## 2025-04-03 RX ORDER — MODAFINIL 200 MG/1
200 TABLET ORAL DAILY
Qty: 90 TABLET | Refills: 0 | Status: SHIPPED | OUTPATIENT
Start: 2025-04-03

## 2025-04-03 RX ORDER — TIRZEPATIDE 2.5 MG/.5ML
2.5 INJECTION, SOLUTION SUBCUTANEOUS WEEKLY
Qty: 12 ML | Refills: 0 | Status: SHIPPED | OUTPATIENT
Start: 2025-04-03

## 2025-04-03 RX ORDER — AMLODIPINE BESYLATE 5 MG/1
5 TABLET ORAL DAILY
Qty: 90 TABLET | Refills: 0 | Status: SHIPPED | OUTPATIENT
Start: 2025-04-03

## 2025-04-03 RX ORDER — ESZOPICLONE 2 MG/1
2 TABLET, FILM COATED ORAL NIGHTLY
Qty: 90 TABLET | Refills: 0 | Status: SHIPPED | OUTPATIENT
Start: 2025-04-03

## 2025-04-03 RX ORDER — PROPRANOLOL HYDROCHLORIDE 120 MG/1
120 CAPSULE, EXTENDED RELEASE ORAL DAILY
Qty: 90 CAPSULE | Refills: 0 | Status: SHIPPED | OUTPATIENT
Start: 2025-04-03

## 2025-04-03 RX ORDER — ACETAMINOPHEN 500 MG
5 TABLET ORAL NIGHTLY
Qty: 90 TABLET | Refills: 0 | Status: SHIPPED | OUTPATIENT
Start: 2025-04-03

## 2025-04-03 RX ORDER — ATORVASTATIN CALCIUM 20 MG/1
20 TABLET, FILM COATED ORAL DAILY
Qty: 90 TABLET | Refills: 0 | Status: SHIPPED | OUTPATIENT
Start: 2025-04-03

## 2025-04-03 RX ORDER — PAROXETINE HYDROCHLORIDE 20 MG/1
20 TABLET, FILM COATED ORAL DAILY
Qty: 90 TABLET | Refills: 0 | Status: SHIPPED | OUTPATIENT
Start: 2025-04-03

## 2025-04-03 RX ORDER — LEVOTHYROXINE SODIUM 200 UG/1
200 TABLET ORAL DAILY
Qty: 90 TABLET | Refills: 0 | Status: SHIPPED | OUTPATIENT
Start: 2025-04-03

## 2025-04-03 RX ORDER — LISINOPRIL AND HYDROCHLOROTHIAZIDE 12.5; 2 MG/1; MG/1
1 TABLET ORAL DAILY
Qty: 90 TABLET | Refills: 0 | Status: SHIPPED | OUTPATIENT
Start: 2025-04-03

## 2025-04-03 RX ORDER — SILDENAFIL 50 MG/1
50 TABLET, FILM COATED ORAL DAILY PRN
Qty: 90 TABLET | Refills: 0 | Status: SHIPPED | OUTPATIENT
Start: 2025-04-03 | End: 2026-04-03

## 2025-04-03 RX ORDER — GABAPENTIN 100 MG/1
100 CAPSULE ORAL 3 TIMES DAILY
Qty: 270 CAPSULE | Refills: 0 | Status: SHIPPED | OUTPATIENT
Start: 2025-04-03 | End: 2025-07-02

## 2025-04-03 NOTE — TELEPHONE ENCOUNTER
He has moved and the soonest appointment he can get with a PCP is 3 months out and he is about to be out of medication , ok to refill?

## 2025-04-03 NOTE — TELEPHONE ENCOUNTER
No care due was identified.  [facility] Embedded Care Due Messages. Reference number: [id number].   4/03/2025 3:29:44 PM CDT

## 2025-04-23 ENCOUNTER — TELEPHONE (OUTPATIENT)
Dept: FAMILY MEDICINE | Facility: CLINIC | Age: 55
End: 2025-04-23
Payer: COMMERCIAL

## 2025-04-23 NOTE — TELEPHONE ENCOUNTER
----- Message from Karli sent at 4/23/2025  1:24 PM CDT -----  Contact: Chioma with [facility] phone [phone]  Pharmacy is calling to clarify an RX.RX name:  modafiniL (PROVIGIL) 200 MG TabWhat do they need to clarify:  Clarification on why the patient is taking this medication.Comments:

## 2025-04-25 ENCOUNTER — PATIENT MESSAGE (OUTPATIENT)
Dept: FAMILY MEDICINE | Facility: CLINIC | Age: 55
End: 2025-04-25
Payer: MEDICAID

## 2025-04-25 ENCOUNTER — TELEPHONE (OUTPATIENT)
Dept: FAMILY MEDICINE | Facility: CLINIC | Age: 55
End: 2025-04-25
Payer: COMMERCIAL

## 2025-04-25 DIAGNOSIS — G47.00 INSOMNIA, UNSPECIFIED TYPE: ICD-10-CM

## 2025-04-25 NOTE — TELEPHONE ENCOUNTER
Called pt about medication Provagil. Pharmacy was called back and stated they would not refill medication. Pt stated he would go talk to the pharmacy and if they still can't fill it he would tell them to send it to another pharmacy.

## 2025-04-28 RX ORDER — MODAFINIL 200 MG/1
200 TABLET ORAL DAILY
Qty: 90 TABLET | Refills: 0 | Status: SHIPPED | OUTPATIENT
Start: 2025-04-28

## 2025-04-28 NOTE — TELEPHONE ENCOUNTER
Alyson to send the Provigil to the pharmacy he request.  Please update the pharmacy and send the request.

## 2025-05-17 ENCOUNTER — PATIENT MESSAGE (OUTPATIENT)
Dept: ADMINISTRATIVE | Facility: HOSPITAL | Age: 55
End: 2025-05-17
Payer: MEDICAID

## 2025-06-28 DIAGNOSIS — I10 PRIMARY HYPERTENSION: ICD-10-CM

## 2025-06-28 NOTE — TELEPHONE ENCOUNTER
----- Message from Citlaly Hendrickson sent at 4/4/2024  2:02 PM CDT -----  Regarding: prior authorization  .Type:  Needs Medical Advice    Who Called: [employer]    Symptoms (please be specific):      How long has patient had these symptoms:      Pharmacy name and phone #:      Would the patient rather a call back or a response via [facility]?     Best Call Back Number: [phone]    Additional Information: [employer] called in regards to the pt stating that she will be getting Ozempic, which was referred to her by the provider; [employer] is stating that a prior authorization is needed for ozempic; please advise.    Phone#: [phone]    Fax#: [facility]  [phone]        [employer]-- website--to submit prior authorization online     Care Due:                  Date            Visit Type   Department     Provider  --------------------------------------------------------------------------------                                EP -                              PRIMARY      [facility]  Last Visit: 06-      CARE ([facility])   MEDICINE       Delores Lazar  Next Visit: None Scheduled  None         None Found                                                            Last  Test          Frequency    Reason                     Performed    Due Date  --------------------------------------------------------------------------------    Office Visit  12 months..  amLODIPine, atorvastatin,   06-   06-                             levothyroxine,                             lisinopriL-hydrochlorothi                             azide, melatonin,                             paroxetine, propranoloL,                             sildenafiL, tirzepatide..    CMP.........  12 months..  atorvastatin,              08- 08-                             lisinopriL-hydrochlorothi                             azide....................    HBA1C.......  6 months...  tirzepatide..............  08- 02-    Lipid Panel.  12 months..  atorvastatin.............  03- 03-    TSH.........  12 months..  levothyroxine............  08- 08-    [facility] Embedded Care Due Messages. Reference number: [id number].   6/28/2025 8:17:42 AM CDT

## 2025-06-30 RX ORDER — AMLODIPINE BESYLATE 5 MG/1
5 TABLET ORAL
Qty: 90 TABLET | Refills: 0 | Status: SHIPPED | OUTPATIENT
Start: 2025-06-30

## 2025-06-30 NOTE — TELEPHONE ENCOUNTER
Refill Routing Note   Medication(s) are not appropriate for processing by [facility] for the following reason(s):        ED/Hospital Visit since last OV with provider    [facility] action(s):  Defer   Requires appointment : Yes     Requires labs : Yes             Appointments  past 12m or future 3m with PCP    Date Provider   Last Visit   6/20/2024 Delores Lazar MD   Next Visit   Visit date not found Delores Lazar MD   ED visits in past 90 days: 0        Note composed:10:00 AM 06/30/2025

## 2025-07-30 DIAGNOSIS — F32.A DEPRESSION, UNSPECIFIED DEPRESSION TYPE: ICD-10-CM

## 2025-07-30 RX ORDER — PAROXETINE 20 MG/1
20 TABLET, FILM COATED ORAL
Qty: 90 TABLET | Refills: 0 | Status: SHIPPED | OUTPATIENT
Start: 2025-07-30

## 2025-07-30 NOTE — TELEPHONE ENCOUNTER
Refill Routing Note   Medication(s) are not appropriate for processing by [facility] for the following reason(s):        Responsible provider unclear    [facility] action(s):  Route        Medication Therapy Plan: no pcp listed on profile      Appointments  past 12m or future 3m with PCP    Date Provider   Last Visit   6/20/2024 Delores Lazar MD   Next Visit   Visit date not found Delores Lazar MD   ED visits in past 90 days: 0        Note composed:12:16 PM 07/30/2025

## (undated) DEVICE — SUT MONOCRYL 4.0 PS2 CP496G

## (undated) DEVICE — EVACUATOR WOUND BULB 100CC

## (undated) DEVICE — SYR LUER-LOCK STERILE 3ML

## (undated) DEVICE — MANIFOLD 4 PORT

## (undated) DEVICE — TOWEL OR DISP STRL BLUE 4/PK

## (undated) DEVICE — CLIP HEMO-LOK ML

## (undated) DEVICE — NDL ECLIPSE SAFETY 23G 1.5IN

## (undated) DEVICE — COVER TIP CURVED SCISSORS XI

## (undated) DEVICE — DRAPE LAPSCP CHOLE 122X102X78

## (undated) DEVICE — SUT PDS II O CT-2 VIL MONO

## (undated) DEVICE — DRAPE ARM DAVINCI XI

## (undated) DEVICE — DECANTER 6 VIAL

## (undated) DEVICE — DRAPE THREE-QTR REINF 53X77IN

## (undated) DEVICE — Device

## (undated) DEVICE — CLIP HEMO-LOK MLX LARGE LF

## (undated) DEVICE — IRRIGATOR ENDOWRIST XI SUCTION

## (undated) DEVICE — SET PNEUMOCLEAR HEAT HUM SE HF

## (undated) DEVICE — SOL NORMAL USPCA 0.9%

## (undated) DEVICE — SEAL CANN UNIVERSAL 5-12MM

## (undated) DEVICE — DEVICE CLOSURE DISP 14G

## (undated) DEVICE — BAG TISSUE RETRIEVAL 5MM

## (undated) DEVICE — KIT ANTIFOG W/SPONG & FLUID

## (undated) DEVICE — OBTURATOR BLADELESS 8MM XI CLR

## (undated) DEVICE — DYE ICG

## (undated) DEVICE — GLOVE SENSICARE PI GRN 7

## (undated) DEVICE — GLOVE SIGNATURE ESSNTL LTX 7

## (undated) DEVICE — ADHESIVE DERMABOND ADVANCED

## (undated) DEVICE — COVER LIGHT HANDLE 80/CA

## (undated) DEVICE — DRAPE COLUMN DAVINCI XI

## (undated) DEVICE — SOL STRL WATER INJ 1000ML BG

## (undated) DEVICE — PACK BASIC SETUP SC BR

## (undated) DEVICE — APPLICATOR CHLORAPREP ORN 26ML

## (undated) DEVICE — GOWN POLY REINF BRTH SLV XL

## (undated) DEVICE — ELECTRODE REM PLYHSV RETURN 9